# Patient Record
Sex: FEMALE | Race: WHITE
[De-identification: names, ages, dates, MRNs, and addresses within clinical notes are randomized per-mention and may not be internally consistent; named-entity substitution may affect disease eponyms.]

---

## 2018-05-31 ENCOUNTER — HOSPITAL ENCOUNTER (EMERGENCY)
Dept: HOSPITAL 47 - EC | Age: 58
Discharge: HOME | End: 2018-05-31
Payer: COMMERCIAL

## 2018-05-31 VITALS — DIASTOLIC BLOOD PRESSURE: 110 MMHG | SYSTOLIC BLOOD PRESSURE: 194 MMHG

## 2018-05-31 VITALS — HEART RATE: 64 BPM | TEMPERATURE: 98.2 F

## 2018-05-31 VITALS — RESPIRATION RATE: 16 BRPM

## 2018-05-31 DIAGNOSIS — Z88.2: ICD-10-CM

## 2018-05-31 DIAGNOSIS — R04.0: Primary | ICD-10-CM

## 2018-05-31 DIAGNOSIS — F17.200: ICD-10-CM

## 2018-05-31 DIAGNOSIS — E07.9: ICD-10-CM

## 2018-05-31 DIAGNOSIS — F32.9: ICD-10-CM

## 2018-05-31 DIAGNOSIS — Z98.890: ICD-10-CM

## 2018-05-31 DIAGNOSIS — G89.29: ICD-10-CM

## 2018-05-31 DIAGNOSIS — Z79.82: ICD-10-CM

## 2018-05-31 DIAGNOSIS — Z91.040: ICD-10-CM

## 2018-05-31 DIAGNOSIS — Z79.899: ICD-10-CM

## 2018-05-31 PROCEDURE — 99283 EMERGENCY DEPT VISIT LOW MDM: CPT

## 2018-05-31 PROCEDURE — 30901 CONTROL OF NOSEBLEED: CPT

## 2018-11-24 ENCOUNTER — HOSPITAL ENCOUNTER (OUTPATIENT)
Dept: HOSPITAL 47 - RADMRIMAIN | Age: 58
End: 2018-11-24
Attending: FAMILY MEDICINE
Payer: COMMERCIAL

## 2018-11-24 DIAGNOSIS — M16.0: Primary | ICD-10-CM

## 2018-11-24 NOTE — MR
EXAMINATION TYPE: MR hip RT wo con

 

DATE OF EXAM: 11/24/2018

 

COMPARISON: None

 

HISTORY: Right hip pain with locking sensation for 6 months per patient.

 

Standard multiplanar, multisequence MRI departmental protocol

 

Multiplanar, multisequence images of the pelvis focus in right hip is acquired. 

 

FINDINGS: Bone marrow signal intensity about the pelvis including bilateral hips is felt maintained. 
No suspicious edema is seen. No linear T1 signal is noted. Femoral head spherical shape is maintained
 bilaterally.

 

There are small to moderate size right slightly greater than left hip joint effusions. There is mild 
to moderate axial joint space loss in both hips. No significant spurring or subchondral cystic change
s noted. No suspicious edema seen at level of the lesser greater trochanters bilaterally. Right labru
m appears intact given limitations of nonarthrogram study.

 

No suspicious groin adenopathy is seen. No suspicious groin hernia is present. Muscle bulk is symmetr
ic and felt within normal limits.

 

Uterus is felt slightly retroverted. Patient has very little intrapelvic fat making evaluation subopt
imal. Bladder is felt within normal limits. No free fluid in pelvis is noted. No suspicious bowel dil
atation is seen. Pubic symphysis is intact.

 

IMPRESSION: 

Symmetric mild to moderate degenerative changes in both hips as detailed above. No evidence for avasc
ular necrosis or radio occult fracture.

## 2018-12-26 ENCOUNTER — HOSPITAL ENCOUNTER (OUTPATIENT)
Dept: HOSPITAL 47 - RADMRIMAIN | Age: 58
Discharge: HOME | End: 2018-12-26
Attending: FAMILY MEDICINE
Payer: COMMERCIAL

## 2018-12-26 DIAGNOSIS — M47.816: ICD-10-CM

## 2018-12-26 DIAGNOSIS — M43.16: ICD-10-CM

## 2018-12-26 DIAGNOSIS — M51.26: Primary | ICD-10-CM

## 2018-12-26 PROCEDURE — 72148 MRI LUMBAR SPINE W/O DYE: CPT

## 2018-12-26 NOTE — MR
EXAMINATION TYPE: MR lumbar spine wo con

 

DATE OF EXAM: 12/26/2018

 

COMPARISON: NONE

 

HISTORY: Low back pain into Right Leg Pain x6 months per patient. Low back pain per order.

 

TECHNIQUE: Multiplanar, multisequence imaging of the lumbar spine is performed without IV contrast.

 

FINDINGS: Sagittal images of the lumbar spine show vertebral body height to appear satisfactory. Ther
e is grade 1 anterolisthesis of L4 on L5. Multilevel disc desiccation is present. There is mild disc 
space during L3-L4 and L4-L5 levels. Posterior disc herniation L3-L4 level effaces the anterior theca
l sac on sagittal images. The conus medullaris ends inferior T12 level without abnormal signal.  The 
bone marrow signal intensity is overall heterogeneous.

 

Axial images show the T12-L1 and L1-L2 levels to appear within normal limits.

 

Axial images at the L2-L3 level show mild facet degenerative changes and ligamentum flavum hypertroph
y effacing posterior lateral thecal sac. There is mild broad disc bulge minimally effacing the anteri
or thecal sac. Left-sided neural foramina shows mild inferior narrowing. Right sided neural foramen i
s patent.

 

Axial images at the L3-L4 level shows mild facet degenerative changes bilaterally. There is broad-bas
ed right paracentral/foraminal disc protrusion effacing intrathecal sac as well as lateral recess and
 causing mild to moderate right-sided anterior inferior neural foraminal narrowing. Encroachment on c
entral right L4 nerves suspected axial image 14. Left-sided neural foramen is patent.

 

Axial images at L4-L5 level show spondylolisthesis with moderate facet degenerative changes bilateral
ly. There is broad-based right foraminal disc protrusion causing asymmetric moderate right-sided infe
rior neural foraminal narrowing encroaching on right L4 nerve sagittal image 12 and axial image 10.

 

Axial images at the L5-S1 level shows moderate facet degenerative changes bilaterally. Spinal canal i
s preserved. Bilateral neural foramina are patent.

 

Slightly prominent fluid-filled bowel loops are seen in the visualized abdomen.

 

 

IMPRESSION: Multilevel degenerative changes in lumbar spine as detailed above. Most prominent disc he
rniation L3-L4 level appears to encroach on central right L4 nerve. Spondylolisthesis with eccentric 
disc herniation L4-L5 level encroaches on the exiting right L4 nerve also.

## 2020-06-14 ENCOUNTER — HOSPITAL ENCOUNTER (INPATIENT)
Dept: HOSPITAL 47 - EC | Age: 60
LOS: 16 days | Discharge: NURSING, REHAB OR LONG-TERM CARE | DRG: 329 | End: 2020-06-30
Payer: COMMERCIAL

## 2020-06-14 VITALS — BODY MASS INDEX: 27.5 KG/M2

## 2020-06-14 DIAGNOSIS — R13.10: ICD-10-CM

## 2020-06-14 DIAGNOSIS — K56.41: ICD-10-CM

## 2020-06-14 DIAGNOSIS — I50.21: ICD-10-CM

## 2020-06-14 DIAGNOSIS — T50.2X5A: ICD-10-CM

## 2020-06-14 DIAGNOSIS — R00.0: ICD-10-CM

## 2020-06-14 DIAGNOSIS — K38.1: ICD-10-CM

## 2020-06-14 DIAGNOSIS — J18.9: ICD-10-CM

## 2020-06-14 DIAGNOSIS — E51.9: ICD-10-CM

## 2020-06-14 DIAGNOSIS — E87.0: ICD-10-CM

## 2020-06-14 DIAGNOSIS — Z98.84: ICD-10-CM

## 2020-06-14 DIAGNOSIS — Z90.710: ICD-10-CM

## 2020-06-14 DIAGNOSIS — E83.39: ICD-10-CM

## 2020-06-14 DIAGNOSIS — G92: ICD-10-CM

## 2020-06-14 DIAGNOSIS — I34.0: ICD-10-CM

## 2020-06-14 DIAGNOSIS — R65.21: ICD-10-CM

## 2020-06-14 DIAGNOSIS — I42.8: ICD-10-CM

## 2020-06-14 DIAGNOSIS — Z79.899: ICD-10-CM

## 2020-06-14 DIAGNOSIS — I25.2: ICD-10-CM

## 2020-06-14 DIAGNOSIS — E87.4: ICD-10-CM

## 2020-06-14 DIAGNOSIS — E55.9: ICD-10-CM

## 2020-06-14 DIAGNOSIS — Z11.59: ICD-10-CM

## 2020-06-14 DIAGNOSIS — D50.9: ICD-10-CM

## 2020-06-14 DIAGNOSIS — A41.9: ICD-10-CM

## 2020-06-14 DIAGNOSIS — Z91.040: ICD-10-CM

## 2020-06-14 DIAGNOSIS — Z79.890: ICD-10-CM

## 2020-06-14 DIAGNOSIS — K21.9: ICD-10-CM

## 2020-06-14 DIAGNOSIS — I27.20: ICD-10-CM

## 2020-06-14 DIAGNOSIS — G43.909: ICD-10-CM

## 2020-06-14 DIAGNOSIS — Z91.19: ICD-10-CM

## 2020-06-14 DIAGNOSIS — K63.1: Primary | ICD-10-CM

## 2020-06-14 DIAGNOSIS — E43: ICD-10-CM

## 2020-06-14 DIAGNOSIS — I11.0: ICD-10-CM

## 2020-06-14 DIAGNOSIS — K35.20: ICD-10-CM

## 2020-06-14 DIAGNOSIS — E87.5: ICD-10-CM

## 2020-06-14 DIAGNOSIS — E87.6: ICD-10-CM

## 2020-06-14 DIAGNOSIS — B37.3: ICD-10-CM

## 2020-06-14 DIAGNOSIS — Z88.2: ICD-10-CM

## 2020-06-14 DIAGNOSIS — Z90.49: ICD-10-CM

## 2020-06-14 DIAGNOSIS — Q43.8: ICD-10-CM

## 2020-06-14 DIAGNOSIS — Z71.6: ICD-10-CM

## 2020-06-14 DIAGNOSIS — J96.01: ICD-10-CM

## 2020-06-14 DIAGNOSIS — K56.49: ICD-10-CM

## 2020-06-14 DIAGNOSIS — E03.9: ICD-10-CM

## 2020-06-14 DIAGNOSIS — J80: ICD-10-CM

## 2020-06-14 DIAGNOSIS — Z78.1: ICD-10-CM

## 2020-06-14 DIAGNOSIS — F17.200: ICD-10-CM

## 2020-06-14 DIAGNOSIS — F32.9: ICD-10-CM

## 2020-06-14 DIAGNOSIS — N17.0: ICD-10-CM

## 2020-06-14 DIAGNOSIS — E86.0: ICD-10-CM

## 2020-06-14 LAB
ALBUMIN SERPL-MCNC: 3.8 G/DL (ref 3.5–5)
ALP SERPL-CCNC: 82 U/L (ref 38–126)
ALT SERPL-CCNC: 18 U/L (ref 4–34)
AMYLASE SERPL-CCNC: 45 U/L (ref 30–110)
ANION GAP SERPL CALC-SCNC: 5 MMOL/L
APTT BLD: 21.2 SEC (ref 22–30)
AST SERPL-CCNC: 31 U/L (ref 14–36)
BASOPHILS # BLD AUTO: 0 K/UL (ref 0–0.2)
BASOPHILS NFR BLD AUTO: 0 %
BUN SERPL-SCNC: 17 MG/DL (ref 7–17)
CALCIUM SPEC-MCNC: 9.6 MG/DL (ref 8.4–10.2)
CHLORIDE SERPL-SCNC: 108 MMOL/L (ref 98–107)
CO2 SERPL-SCNC: 25 MMOL/L (ref 22–30)
EOSINOPHIL # BLD AUTO: 0.2 K/UL (ref 0–0.7)
EOSINOPHIL NFR BLD AUTO: 3 %
ERYTHROCYTE [DISTWIDTH] IN BLOOD BY AUTOMATED COUNT: 5.2 M/UL (ref 3.8–5.4)
ERYTHROCYTE [DISTWIDTH] IN BLOOD: 16.5 % (ref 11.5–15.5)
GLUCOSE BLD-MCNC: 130 MG/DL (ref 75–99)
GLUCOSE SERPL-MCNC: 138 MG/DL (ref 74–99)
HCT VFR BLD AUTO: 50.4 % (ref 34–46)
HGB BLD-MCNC: 16.2 GM/DL (ref 11.4–16)
INR PPP: 1 (ref ?–1.2)
LYMPHOCYTES # SPEC AUTO: 1.2 K/UL (ref 1–4.8)
LYMPHOCYTES NFR SPEC AUTO: 19 %
MCH RBC QN AUTO: 31.1 PG (ref 25–35)
MCHC RBC AUTO-ENTMCNC: 32 G/DL (ref 31–37)
MCV RBC AUTO: 96.9 FL (ref 80–100)
MONOCYTES # BLD AUTO: 0.3 K/UL (ref 0–1)
MONOCYTES NFR BLD AUTO: 4 %
NEUTROPHILS # BLD AUTO: 4.6 K/UL (ref 1.3–7.7)
NEUTROPHILS NFR BLD AUTO: 73 %
PLATELET # BLD AUTO: 289 K/UL (ref 150–450)
POTASSIUM SERPL-SCNC: 3.6 MMOL/L (ref 3.5–5.1)
PROT SERPL-MCNC: 6.2 G/DL (ref 6.3–8.2)
PT BLD: 10 SEC (ref 9–12)
SODIUM SERPL-SCNC: 138 MMOL/L (ref 137–145)
WBC # BLD AUTO: 6.3 K/UL (ref 3.8–10.6)

## 2020-06-14 PROCEDURE — 74018 RADEX ABDOMEN 1 VIEW: CPT

## 2020-06-14 PROCEDURE — 80061 LIPID PANEL: CPT

## 2020-06-14 PROCEDURE — 93306 TTE W/DOPPLER COMPLETE: CPT

## 2020-06-14 PROCEDURE — 83735 ASSAY OF MAGNESIUM: CPT

## 2020-06-14 PROCEDURE — 83690 ASSAY OF LIPASE: CPT

## 2020-06-14 PROCEDURE — 85730 THROMBOPLASTIN TIME PARTIAL: CPT

## 2020-06-14 PROCEDURE — 84484 ASSAY OF TROPONIN QUANT: CPT

## 2020-06-14 PROCEDURE — 80053 COMPREHEN METABOLIC PANEL: CPT

## 2020-06-14 PROCEDURE — 84443 ASSAY THYROID STIM HORMONE: CPT

## 2020-06-14 PROCEDURE — 85610 PROTHROMBIN TIME: CPT

## 2020-06-14 PROCEDURE — 70450 CT HEAD/BRAIN W/O DYE: CPT

## 2020-06-14 PROCEDURE — 99291 CRITICAL CARE FIRST HOUR: CPT

## 2020-06-14 PROCEDURE — 96365 THER/PROPH/DIAG IV INF INIT: CPT

## 2020-06-14 PROCEDURE — 71045 X-RAY EXAM CHEST 1 VIEW: CPT

## 2020-06-14 PROCEDURE — 86901 BLOOD TYPING SEROLOGIC RH(D): CPT

## 2020-06-14 PROCEDURE — 74177 CT ABD & PELVIS W/CONTRAST: CPT

## 2020-06-14 PROCEDURE — 83605 ASSAY OF LACTIC ACID: CPT

## 2020-06-14 PROCEDURE — 82550 ASSAY OF CK (CPK): CPT

## 2020-06-14 PROCEDURE — 82306 VITAMIN D 25 HYDROXY: CPT

## 2020-06-14 PROCEDURE — 95819 EEG AWAKE AND ASLEEP: CPT

## 2020-06-14 PROCEDURE — 86880 COOMBS TEST DIRECT: CPT

## 2020-06-14 PROCEDURE — 36573 INSJ PICC RS&I 5 YR+: CPT

## 2020-06-14 PROCEDURE — 81001 URINALYSIS AUTO W/SCOPE: CPT

## 2020-06-14 PROCEDURE — 86140 C-REACTIVE PROTEIN: CPT

## 2020-06-14 PROCEDURE — 82150 ASSAY OF AMYLASE: CPT

## 2020-06-14 PROCEDURE — 94002 VENT MGMT INPAT INIT DAY: CPT

## 2020-06-14 PROCEDURE — 96361 HYDRATE IV INFUSION ADD-ON: CPT

## 2020-06-14 PROCEDURE — 87075 CULTR BACTERIA EXCEPT BLOOD: CPT

## 2020-06-14 PROCEDURE — 93005 ELECTROCARDIOGRAM TRACING: CPT

## 2020-06-14 PROCEDURE — 86870 RBC ANTIBODY IDENTIFICATION: CPT

## 2020-06-14 PROCEDURE — 82805 BLOOD GASES W/O2 SATURATION: CPT

## 2020-06-14 PROCEDURE — 83036 HEMOGLOBIN GLYCOSYLATED A1C: CPT

## 2020-06-14 PROCEDURE — 94640 AIRWAY INHALATION TREATMENT: CPT

## 2020-06-14 PROCEDURE — 82525 ASSAY OF COPPER: CPT

## 2020-06-14 PROCEDURE — 84255 ASSAY OF SELENIUM: CPT

## 2020-06-14 PROCEDURE — 84630 ASSAY OF ZINC: CPT

## 2020-06-14 PROCEDURE — 82728 ASSAY OF FERRITIN: CPT

## 2020-06-14 PROCEDURE — 87205 SMEAR GRAM STAIN: CPT

## 2020-06-14 PROCEDURE — 84439 ASSAY OF FREE THYROXINE: CPT

## 2020-06-14 PROCEDURE — 84478 ASSAY OF TRIGLYCERIDES: CPT

## 2020-06-14 PROCEDURE — 96375 TX/PRO/DX INJ NEW DRUG ADDON: CPT

## 2020-06-14 PROCEDURE — 84590 ASSAY OF VITAMIN A: CPT

## 2020-06-14 PROCEDURE — 86850 RBC ANTIBODY SCREEN: CPT

## 2020-06-14 PROCEDURE — 83550 IRON BINDING TEST: CPT

## 2020-06-14 PROCEDURE — 96366 THER/PROPH/DIAG IV INF ADDON: CPT

## 2020-06-14 PROCEDURE — 82533 TOTAL CORTISOL: CPT

## 2020-06-14 PROCEDURE — 36415 COLL VENOUS BLD VENIPUNCTURE: CPT

## 2020-06-14 PROCEDURE — 88307 TISSUE EXAM BY PATHOLOGIST: CPT

## 2020-06-14 PROCEDURE — 84425 ASSAY OF VITAMIN B-1: CPT

## 2020-06-14 PROCEDURE — 82040 ASSAY OF SERUM ALBUMIN: CPT

## 2020-06-14 PROCEDURE — 82607 VITAMIN B-12: CPT

## 2020-06-14 PROCEDURE — 36600 WITHDRAWAL OF ARTERIAL BLOOD: CPT

## 2020-06-14 PROCEDURE — 0DTJ0ZZ RESECTION OF APPENDIX, OPEN APPROACH: ICD-10-PCS

## 2020-06-14 PROCEDURE — 96376 TX/PRO/DX INJ SAME DRUG ADON: CPT

## 2020-06-14 PROCEDURE — 0DTN0ZZ RESECTION OF SIGMOID COLON, OPEN APPROACH: ICD-10-PCS

## 2020-06-14 PROCEDURE — 82553 CREATINE MB FRACTION: CPT

## 2020-06-14 PROCEDURE — 76770 US EXAM ABDO BACK WALL COMP: CPT

## 2020-06-14 PROCEDURE — 0D1M0Z4 BYPASS DESCENDING COLON TO CUTANEOUS, OPEN APPROACH: ICD-10-PCS

## 2020-06-14 PROCEDURE — 82746 ASSAY OF FOLIC ACID SERUM: CPT

## 2020-06-14 PROCEDURE — 87070 CULTURE OTHR SPECIMN AEROBIC: CPT

## 2020-06-14 PROCEDURE — 86902 BLOOD TYPE ANTIGEN DONOR EA: CPT

## 2020-06-14 PROCEDURE — 83540 ASSAY OF IRON: CPT

## 2020-06-14 PROCEDURE — 83970 ASSAY OF PARATHORMONE: CPT

## 2020-06-14 PROCEDURE — 82330 ASSAY OF CALCIUM: CPT

## 2020-06-14 PROCEDURE — 80048 BASIC METABOLIC PNL TOTAL CA: CPT

## 2020-06-14 PROCEDURE — 87040 BLOOD CULTURE FOR BACTERIA: CPT

## 2020-06-14 PROCEDURE — 94660 CPAP INITIATION&MGMT: CPT

## 2020-06-14 PROCEDURE — 0DCN0ZZ EXTIRPATION OF MATTER FROM SIGMOID COLON, OPEN APPROACH: ICD-10-PCS

## 2020-06-14 PROCEDURE — 85025 COMPLETE CBC W/AUTO DIFF WBC: CPT

## 2020-06-14 PROCEDURE — 0DCM0ZZ EXTIRPATION OF MATTER FROM DESCENDING COLON, OPEN APPROACH: ICD-10-PCS

## 2020-06-14 PROCEDURE — 94003 VENT MGMT INPAT SUBQ DAY: CPT

## 2020-06-14 PROCEDURE — 86900 BLOOD TYPING SEROLOGIC ABO: CPT

## 2020-06-14 PROCEDURE — 87086 URINE CULTURE/COLONY COUNT: CPT

## 2020-06-14 PROCEDURE — 84132 ASSAY OF SERUM POTASSIUM: CPT

## 2020-06-14 PROCEDURE — 84100 ASSAY OF PHOSPHORUS: CPT

## 2020-06-14 RX ADMIN — CEFAZOLIN SCH MLS/HR: 330 INJECTION, POWDER, FOR SOLUTION INTRAMUSCULAR; INTRAVENOUS at 17:35

## 2020-06-14 NOTE — XR
EXAMINATION TYPE: XR KUB , 2 VIEWS

 

DATE OF EXAM ORDERED: 6/14/2020

 

HISTORY: abdominal pain.

 

COMPARISON: None.

 

FINDINGS:  The lung bases are clear.

 

Within the abdomen, the abdominal gas pattern is normal. There is no evidence of obstruction or free 
air. Vascular calcifications are noted.

 

IMPRESSION: 

 

NO ACUTE INTRA-ABDOMINAL ABNORMALITY.

## 2020-06-14 NOTE — P.ANPRN
Procedure Note - Anesthesia





- Invasive Line


  ** Right Central Line


Time Out Performed: Yes (presurgical, emergent procedure)


Date of Procedure: 06/14/20


Time of Procedure: 21:30


Location of Patient: PreOp


Preparation: Sterile Prep, Sterile Dressing


Ultrasound Used: Yes


Purpose - Visualization and  Identification of Vasculature: Yes


Needle Guage: 18g angio


Image Stored and Saved: Yes


Narrative: 


Central line placement per sterile protocol utilized. Right IJ prepped.   +local

+angio +jwire +uneventful dilation and introduction right IJ TLC.   Secured at 

15cm.   dressed.

## 2020-06-14 NOTE — CT
EXAMINATION TYPE: CT abdomen pelvis w con

 

DATE OF EXAM: 6/14/2020

 

REFERENCE: NONE

 

HISTORY: ab pain/distention

HISTORY: pain and distention

 

REFERENCE: NONE

 

CT DLP: 804.2 mGy

Automated exposure control for dose reduction was used.

 

TECHNIQUE: Helical acquisition through the abdomen and pelvis was obtained following the oral ingesti
on of without Oral Contrast and following intravenous administration of 100 mL of Isovue 300. The anabel
a was reformatted in axial, coronal and sagittal projections.

 

FINDINGS:  There is mild dependent atelectasis in the dependent portions of the lungs. There is also 
small amount of airspace disease in the left lingula.

 

There is no pleural or pericardial fluid. The heart is not enlarged.

 

Within the abdomen, there is been previous gastric surgery. There are several droplets of free air wi
thin the abdomen. There is a small amount of ascites.

 

The gallbladder is been removed. The liver and spleen appear normal.

 

Both adrenal glands appear unremarkable.

 

Both kidneys demonstrate function and appear morphologically normal.

 

There is dilatation of this common bile duct which measures up to 1.5 cm at the level of the head of 
the pancreas. The pancreas is otherwise unremarkable.

 

There is no significant retroperitoneal, iliac or inguinal adenopathy.

 

The bladder is unremarkable. The uterus and ovaries appear normal.

 

There is no significant diverticulosis is no radiographic evidence of diverticulitis. I do not see co
nvincing evidence of volvulus. The appendix is not visualized with certainty.

 

There is diffuse thickening of the small bowel there is some mild dilatation of the small bowel. The 
the celiac and SMA arteries are patent. The MANJEET artery is not visualized with certainty.

 

There is degenerative disc disease and mild hypertrophic spondylosis most marked at L3-4. There is fa
cet arthropathy in the lower lumbar facets.

 

IMPRESSION: 

1. DIFFUSELY THICKENED SMALL BOWEL THROUGHOUT THE ABDOMEN.

2. SMALL DROPLETS OF FREE AIR AS WELL AS A SMALL AMOUNT OF ASCITES.

3. EVIDENCE OF MULTIPLE PREVIOUS SURGERIES INCLUDING SMALL BOWEL RESECTION AND GASTRIC SURGERY.

4. DEGENERATIVE CHANGES WITHIN THE LUMBAR SPINE AND DORSAL SPINE.

 

THIS REPORT WAS PHONED TO DR. ZHAO IN THE ER AT THE TIME OF REPORTING.

## 2020-06-14 NOTE — P.GSHP
History of Present Illness


H&P Date: 20








CHIEF COMPLAINT: Abdominal pain





HISTORY OF PRESENT ILLNESS: The patient is a 59-year-old female who presents 

acutely to the emergency room after developing acute onset abdominal pain 4 

hours ago.  She reports that she has a history of gastric bypass 15 years ago, 

 and has not had any bariatric follow-up.  She is an active tobacco abuser. 

She reports intermittent dysphagia to foods.  She reports no passage of flatus 

today or bowel movement today.  Her  is at bedside giving additional 

history where she usually has a bowel movement daily however this is changed.  

He reports that she complained of abdominal pain during the car ride to the 

emergency room.  At this time, patient reports severe abdominal pain where she 

is uncomfortable even with laying down.  On further discussion, patient reports 

having several weeks of abdominal pain mostly epigastric for almost one month 

and had not sought any medical attention.  





PAST MEDICAL HISTORY: 


See list and reviewed





PAST SURGICAL HISTORY: 


See list and reviewed.  History of gastric bypass and cholecystectomy





MEDICATIONS: 


See list and reviewed





ALLERGIES: 


See list and reviewed





SOCIAL HISTORY: See list and reviewed.  Active tobacco abuse





FAMILY HISTORY:  See list and reviewed





REVIEW OF ORGAN SYSTEMS:


CONSTITUTIONAL:  No fevers or chills.  Intentional weight loss due to gastric 

bypass surgery over 15 years ago, .


EYES: Denies any trouble with vision. No glasses.


HEENT:  No difficulties with hearing. No nosebleeds.  No difficulty swallowing. 


RESPIRATORY:  Denies pneumonia. 


CARDIOVASCULAR:  Denies any chest pain.


GASTROINTESTINAL: Has food intolerance.  Has change in bowel habits and gas 

bloat.  


GENITOURINARY:  Denies any blood in urine or increased urinary frequency.  


NEUROLOGICAL:  No seizure disorders or headaches.


MUSCULOSKELETAL:  Has back pain, stiffness or joint arthritis. 


SKIN: No current skin cancer. No rash.


PSYCHIATRIC:  Has depression.  No suicidal ideation.


ENDOCRINE:  Has thyroid disorders. Denies any blood sugar glucose intolerance.


HEME/LYMPHATIC: Denies any lumps and bumps around the neck. No recent deep 

venous thrombosis.


ALLERGY/IMMUNOLOGY:  No immunoglobulin therapy. No immune deficiencies.


BREAST: Denies current breast lumps, pain or nipple discharge.





PHYSICAL EXAM:


VITALS: Reviewed


CONSTITUTIONAL:  Well developed and in acute distress. 


EYES:  Conjuctivae without sclera icterus. Pupils are equally round and reactive

to light. Extraocular movements grossly intact. 


HEAD, EARS, NOSE, THROAT: Dry buccal mucosa. Head is atraumatic, normocephalic. 

Hears conversational speech. No nasal drainage. 


NECK:  Supple. No JV distention. No thyroidomegaly.


RESPIRATORY:  Non-labored respirations and equal bilateral excursions. No gross 

wheezes. 


CARDIOVASCULAR:  Regular rate and rhythm.  Extremities without moderate edema. 

Palpable 2+ radial pulses.


ABDOMEN:  Soft.  Distended.  Diffusely tender with peritonitis


LYMPH: No neck lymphadenopathy. 


MUSCULOSKELETAL: No clubbing cyanosis or edema.  Nail and fingers with good 

capillary refill.


SKIN:  Warm and well perfused and tanned.


NEUROLOGIC: Cranial nerves II through XII grossly intact. Sensation upper and 

extremities intact. No focal or lateralizing signs. 


PSYCH:  Appropriate affect.  Alert and oriented to person, place and time. 





CLINCAL LABS: Reviewed.  WBC normal 6300.  Lactate normal 1.1





IMAGING: Independently reviewed demonstrating features of gastric bypass.  Small

bowel including the [place] limb with moderate edema.  Moderate stool throughout 

the entire colon.





RADIOLOGY: Report reviewed with intraperitoneal small foci of air and ascites.





ASSESSMENT: 


1.  Abnormal computed tomography scan with intraperitoneal air


2.  Abdominal pain with peritonitis, generalized


3.  History of gastric bypass.





PLAN: 


1.  Clinical exam consistent with peritonitis including with history of gastric 

bypass.  Emergent exploratory laparotomy with small bowel resection and possible

ostomy also prescribed for which patient is high risk secondary to her smoking.


2.  Extensive tobacco cessation counseling performed over 3 minutes as smoking 

is contraindicated with history of gastric bypass for risk of ulcers or 

perforation


3.  Recovery and intensive care unit including prolonged intubation also 

described.


4.  All questions were addressed and answered to the patient and  at 

bedside.


5.  Inpatient hospitalization over 2 nights


6.  Emergent critical and assessment performed with immediate communication with

additional physicians including anesthesia, IV fluid boluses, EKG, surgical 

intervention described.





CRITICAL CARE TIME: 33 minutes











Past Medical History


Past Medical History: Thyroid Disorder


History of Any Multi-Drug Resistant Organisms: None Reported


Past Surgical History: Bariatric Surgery,  Section, Cholecystectomy, 

Tonsillectomy


Additional Past Surgical History / Comment(s): gastric bypass, sinus


Past Psychological History: Depression


Smoking Status: Current every day smoker


Past Alcohol Use History: None Reported


Past Drug Use History: None Reported





Medications and Allergies


                                Home Medications











 Medication  Instructions  Recorded  Confirmed  Type


 


Baclofen [Lioresal] 20 mg PO HS 18 History


 


FLUoxetine HCL [PROzac] 20 mg PO DAILY 18 History


 


Levothyroxine Sodium [Synthroid] 125 mcg PO DAILY 18 History


 


Propranolol HCl [Inderal LA] 80 mg PO HS 18 History


 


Rizatriptan Benzoate [Maxalt MLT] 10 mg PO DAILY PRN 18 History


 


Butalb/APAP/Caff -40Mg 1 tab PO Q4H PRN 20 History





[Fioricet -40]    


 


Chlorthalidone 50 mg PO DAILY 20 History


 


Cholecalciferol [Vitamin D3 (25 1,000 unit PO DAILY 20 History





Mcg = 1000 Iu)]    


 


Multivitamins, Thera [Multivitamin 1 tab PO DAILY 20 History





(formulary)]    








                                    Allergies











Allergy/AdvReac Type Severity Reaction Status Date / Time


 


latex Allergy  Rash/Hives Verified 20 12:08


 


Sulfa (Sulfonamide Allergy  Unknown Verified 20 12:08





Antibiotics)   Childhood  














Surgical - Exam


                                   Vital Signs











Temp Pulse Resp BP Pulse Ox


 


 97.4 F L  63   16   100/75   99 


 


 20 10:50  20 10:50  20 10:50  20 10:50  20 10:50














Results





- Labs





                                 20 11:00





                                 20 11:00


                  Abnormal Lab Results - Last 24 Hours (Table)











  20 Range/Units





  11:00 11:00 11:00 


 


Hgb  16.2 H    (11.4-16.0)  gm/dL


 


Hct  50.4 H    (34.0-46.0)  %


 


RDW  16.5 H    (11.5-15.5)  %


 


APTT   21.2 L   (22.0-30.0)  sec


 


Chloride    108 H  ()  mmol/L


 


Glucose    138 H  (74-99)  mg/dL


 


Total Protein    6.2 L  (6.3-8.2)  g/dL








                                 Diabetes panel











  20 Range/Units





  11:00 


 


Sodium  138  (137-145)  mmol/L


 


Potassium  3.6  (3.5-5.1)  mmol/L


 


Chloride  108 H  ()  mmol/L


 


Carbon Dioxide  25  (22-30)  mmol/L


 


BUN  17  (7-17)  mg/dL


 


Creatinine  0.53  (0.52-1.04)  mg/dL


 


Glucose  138 H  (74-99)  mg/dL


 


Calcium  9.6  (8.4-10.2)  mg/dL


 


AST  31  (14-36)  U/L


 


ALT  18  (4-34)  U/L


 


Alkaline Phosphatase  82  ()  U/L


 


Total Protein  6.2 L  (6.3-8.2)  g/dL


 


Albumin  3.8  (3.5-5.0)  g/dL








                                  Calcium panel











  20 Range/Units





  11:00 


 


Calcium  9.6  (8.4-10.2)  mg/dL


 


Albumin  3.8  (3.5-5.0)  g/dL








                                 Pituitary panel











  20 Range/Units





  11:00 


 


Sodium  138  (137-145)  mmol/L


 


Potassium  3.6  (3.5-5.1)  mmol/L


 


Chloride  108 H  ()  mmol/L


 


Carbon Dioxide  25  (22-30)  mmol/L


 


BUN  17  (7-17)  mg/dL


 


Creatinine  0.53  (0.52-1.04)  mg/dL


 


Glucose  138 H  (74-99)  mg/dL


 


Calcium  9.6  (8.4-10.2)  mg/dL








                                  Adrenal panel











  20 Range/Units





  11:00 


 


Sodium  138  (137-145)  mmol/L


 


Potassium  3.6  (3.5-5.1)  mmol/L


 


Chloride  108 H  ()  mmol/L


 


Carbon Dioxide  25  (22-30)  mmol/L


 


BUN  17  (7-17)  mg/dL


 


Creatinine  0.53  (0.52-1.04)  mg/dL


 


Glucose  138 H  (74-99)  mg/dL


 


Calcium  9.6  (8.4-10.2)  mg/dL


 


Total Bilirubin  0.3  (0.2-1.3)  mg/dL


 


AST  31  (14-36)  U/L


 


ALT  18  (4-34)  U/L


 


Alkaline Phosphatase  82  ()  U/L


 


Total Protein  6.2 L  (6.3-8.2)  g/dL


 


Albumin  3.8  (3.5-5.0)  g/dL














Assessment and Plan


(1) Peritonitis (acute) generalized


Current Visit: Yes   Status: Acute   Code(s): K65.0 - GENERALIZED (ACUTE) 

PERITONITIS   SNOMED Code(s): 79805695


   





(2) History of gastric bypass


Current Visit: Yes   Status: Acute   Code(s): Z98.84 - BARIATRIC SURGERY STATUS 

 SNOMED Code(s): 401085087


   





(3) Medical non-compliance


Current Visit: Yes   Status: Acute   Code(s): Z91.19 - PATIENT'S NONCOMPLIANCE W

OT MEDICAL TREATMENT AND REGIMEN   SNOMED Code(s): 827800695


   





(4) Tobacco abuse


Current Visit: Yes   Status: Acute   Code(s): Z72.0 - TOBACCO USE   SNOMED Code(

s): 702234687


   





(5) Tobacco abuse counseling


Current Visit: Yes   Status: Acute   Code(s): Z71.6 - TOBACCO ABUSE COUNSELING  

SNOMED Code(s): 867728294


   





(6) Depressive disorder


Current Visit: Yes   Status: Acute   Code(s): F32.9 - MAJOR DEPRESSIVE DISORDER,

SINGLE EPISODE, UNSPECIFIED   SNOMED Code(s): 09496293


   





(7) Hypothyroidism


Current Visit: Yes   Status: Acute   Code(s): E03.9 - HYPOTHYROIDISM, 

UNSPECIFIED   SNOMED Code(s): 17019233


   





(8) Abdominal pain


Current Visit: Yes   Status: Acute   Code(s): R10.9 - UNSPECIFIED ABDOMINAL PAIN

  SNOMED Code(s): 64933328


   





(9) Free intraperitoneal air


Current Visit: Yes   Status: Acute   Code(s): K66.8 - OTHER SPECIFIED DISORDERS 

OF PERITONEUM   SNOMED Code(s): 24292980


   





(10) Dehydration


Current Visit: Yes   Status: Acute   Code(s): E86.0 - DEHYDRATION   SNOMED 

Code(s): 55953277

## 2020-06-14 NOTE — ED
General Adult HPI





- General


Chief complaint: Abdominal Pain


Stated complaint: abd pain


Time Seen by Provider: 20 10:56


Source: patient, RN notes reviewed, old records reviewed


Mode of arrival: wheelchair


Limitations: no limitations





- History of Present Illness


Initial comments: 





59-year-old female presenting for evaluation of generalized abdominal pain.  

Pain began yesterday evening, crampy in nature.  She's had nausea without 

significant vomiting.  She states she did pass gas and had a small bowel 

movement this morning.  Pain is generalized, severe.  She reports abdominal 

distention.  Chest previous history of cholecystectomy, hysterectomy.  No fever 

or chills.  No chest pain or dyspnea.





- Related Data


                                Home Medications











 Medication  Instructions  Recorded  Confirmed


 


Baclofen [Lioresal] 20 mg PO HS 18


 


FLUoxetine HCL [PROzac] 20 mg PO DAILY 18


 


Levothyroxine Sodium [Synthroid] 125 mcg PO DAILY 18


 


Propranolol HCl [Inderal LA] 80 mg PO HS 18


 


Rizatriptan Benzoate [Maxalt MLT] 10 mg PO DAILY PRN 18


 


Butalb/APAP/Caff -40Mg 1 tab PO Q4H PRN 20





[Fioricet -40]   


 


Chlorthalidone 50 mg PO DAILY 20


 


Cholecalciferol [Vitamin D3 (25 1,000 unit PO DAILY 20





Mcg = 1000 Iu)]   


 


Multivitamins, Thera [Multivitamin 1 tab PO DAILY 20





(formulary)]   











                                    Allergies











Allergy/AdvReac Type Severity Reaction Status Date / Time


 


latex Allergy  Rash/Hives Verified 20 12:08


 


Sulfa (Sulfonamide Allergy  Unknown Verified 20 12:08





Antibiotics)   Childhood  














Review of Systems


ROS Statement: 


Those systems with pertinent positive or pertinent negative responses have been 

documented in the HPI.





ROS Other: All systems not noted in ROS Statement are negative.





Past Medical History


Past Medical History: Thyroid Disorder


History of Any Multi-Drug Resistant Organisms: None Reported


Past Surgical History: Bariatric Surgery,  Section, Cholecystectomy, 

Tonsillectomy


Additional Past Surgical History / Comment(s): gastric bypass, sinus


Past Psychological History: Depression


Smoking Status: Current every day smoker


Past Alcohol Use History: None Reported


Past Drug Use History: None Reported





General Exam


Limitations: no limitations


General appearance: alert, in distress


Head exam: Present: atraumatic, normocephalic


Eye exam: Present: normal appearance, PERRL


ENT exam: Present: mucous membranes dry


Neck exam: Present: normal inspection.  Absent: tenderness, meningismus


Respiratory exam: Present: normal lung sounds bilaterally.  Absent: respiratory 

distress, wheezes


Cardiovascular Exam: Present: regular rate, normal rhythm


GI/Abdominal exam: Present: soft, distended, tenderness, guarding


Extremities exam: Present: normal inspection, normal capillary refill


Neurological exam: Present: alert, oriented X3, CN II-XII intact.  Absent: motor

sensory deficit


Psychiatric exam: Present: normal affect, normal mood


Skin exam: Present: warm, dry, intact.  Absent: cyanosis, diaphoretic





Course


                                   Vital Signs











  20





  10:50


 


Temperature 97.4 F L


 


Pulse Rate 63


 


Respiratory 16





Rate 


 


Blood Pressure 100/75


 


O2 Sat by Pulse 99





Oximetry 














EKG Findings





- EKG Comments:


EKG Findings:: EKG: Normal sinus rhythm, no ST segment elevation inferior 

infarct, rate of 92, MT interval 152, QRS duration 94, 





Medical Decision Making





- Medical Decision Making





59-year-old female with severe abdominal pain.  Remote history of bariatric 

surgery, hysterectomy, cholecystectomy.  Patient has a distended abdomen, 

diffusely tender to palpation, palpable mass in the right hemiabdomen.  Vitals 

are stable.  Laboratory testing performed, no leukocytosis, stable hemoglobin, 

normal lactic, normal electrolytes, CT performed showing intraperitoneal free 

air, thickened small bowel, case is discussed with general surgery on-call, Dr. Reed, patient will be taken to the operating room for urgent evaluation of 

intraperitoneal free air.





- Lab Data


Result diagrams: 


                                 20 11:00





                                 20 11:00


                                   Lab Results











  20 Range/Units





  11:00 11:00 11:00 


 


WBC  6.3    (3.8-10.6)  k/uL


 


RBC  5.20    (3.80-5.40)  m/uL


 


Hgb  16.2 H    (11.4-16.0)  gm/dL


 


Hct  50.4 H    (34.0-46.0)  %


 


MCV  96.9    (80.0-100.0)  fL


 


MCH  31.1    (25.0-35.0)  pg


 


MCHC  32.0    (31.0-37.0)  g/dL


 


RDW  16.5 H    (11.5-15.5)  %


 


Plt Count  289    (150-450)  k/uL


 


Neutrophils %  73    %


 


Lymphocytes %  19    %


 


Monocytes %  4    %


 


Eosinophils %  3    %


 


Basophils %  0    %


 


Neutrophils #  4.6    (1.3-7.7)  k/uL


 


Lymphocytes #  1.2    (1.0-4.8)  k/uL


 


Monocytes #  0.3    (0-1.0)  k/uL


 


Eosinophils #  0.2    (0-0.7)  k/uL


 


Basophils #  0.0    (0-0.2)  k/uL


 


Anisocytosis  Slight    


 


PT   10.0   (9.0-12.0)  sec


 


INR   1.0   (<1.2)  


 


APTT   21.2 L   (22.0-30.0)  sec


 


Sodium    138  (137-145)  mmol/L


 


Potassium    3.6  (3.5-5.1)  mmol/L


 


Chloride    108 H  ()  mmol/L


 


Carbon Dioxide    25  (22-30)  mmol/L


 


Anion Gap    5  mmol/L


 


BUN    17  (7-17)  mg/dL


 


Creatinine    0.53  (0.52-1.04)  mg/dL


 


Est GFR (CKD-EPI)AfAm    >90  (>60 ml/min/1.73 sqM)  


 


Est GFR (CKD-EPI)NonAf    >90  (>60 ml/min/1.73 sqM)  


 


Glucose    138 H  (74-99)  mg/dL


 


Plasma Lactic Acid Buck     (0.7-2.0)  mmol/L


 


Calcium    9.6  (8.4-10.2)  mg/dL


 


Total Bilirubin    0.3  (0.2-1.3)  mg/dL


 


AST    31  (14-36)  U/L


 


ALT    18  (4-34)  U/L


 


Alkaline Phosphatase    82  ()  U/L


 


Total Protein    6.2 L  (6.3-8.2)  g/dL


 


Albumin    3.8  (3.5-5.0)  g/dL


 


Amylase    45  ()  U/L


 


Lipase    66  ()  U/L














  20 Range/Units





  11:00 


 


WBC   (3.8-10.6)  k/uL


 


RBC   (3.80-5.40)  m/uL


 


Hgb   (11.4-16.0)  gm/dL


 


Hct   (34.0-46.0)  %


 


MCV   (80.0-100.0)  fL


 


MCH   (25.0-35.0)  pg


 


MCHC   (31.0-37.0)  g/dL


 


RDW   (11.5-15.5)  %


 


Plt Count   (150-450)  k/uL


 


Neutrophils %   %


 


Lymphocytes %   %


 


Monocytes %   %


 


Eosinophils %   %


 


Basophils %   %


 


Neutrophils #   (1.3-7.7)  k/uL


 


Lymphocytes #   (1.0-4.8)  k/uL


 


Monocytes #   (0-1.0)  k/uL


 


Eosinophils #   (0-0.7)  k/uL


 


Basophils #   (0-0.2)  k/uL


 


Anisocytosis   


 


PT   (9.0-12.0)  sec


 


INR   (<1.2)  


 


APTT   (22.0-30.0)  sec


 


Sodium   (137-145)  mmol/L


 


Potassium   (3.5-5.1)  mmol/L


 


Chloride   ()  mmol/L


 


Carbon Dioxide   (22-30)  mmol/L


 


Anion Gap   mmol/L


 


BUN   (7-17)  mg/dL


 


Creatinine   (0.52-1.04)  mg/dL


 


Est GFR (CKD-EPI)AfAm   (>60 ml/min/1.73 sqM)  


 


Est GFR (CKD-EPI)NonAf   (>60 ml/min/1.73 sqM)  


 


Glucose   (74-99)  mg/dL


 


Plasma Lactic Acid Buck  1.1  (0.7-2.0)  mmol/L


 


Calcium   (8.4-10.2)  mg/dL


 


Total Bilirubin   (0.2-1.3)  mg/dL


 


AST   (14-36)  U/L


 


ALT   (4-34)  U/L


 


Alkaline Phosphatase   ()  U/L


 


Total Protein   (6.3-8.2)  g/dL


 


Albumin   (3.5-5.0)  g/dL


 


Amylase   ()  U/L


 


Lipase   ()  U/L














Critical Care Time


Critical Care Time: Yes


Total Critical Care Time: 35





Disposition


Clinical Impression: 


 Abdominal pain, Free intraperitoneal air





Disposition: ADMITTED AS IP TO THIS HOSP


Condition: Serious


Is patient prescribed a controlled substance at d/c from ED?: No


Referrals: 


Galdino Shaw DO [Primary Care Provider] - 1-2 days


Decision to Admit Reason: Admit from EC


Decision Date: 20


Decision Time: 12:03

## 2020-06-15 LAB
ALBUMIN SERPL-MCNC: 2.1 G/DL (ref 3.5–5)
ALP SERPL-CCNC: 40 U/L (ref 38–126)
ALT SERPL-CCNC: 18 U/L (ref 4–34)
ANION GAP SERPL CALC-SCNC: 3 MMOL/L
AST SERPL-CCNC: 36 U/L (ref 14–36)
BUN SERPL-SCNC: 21 MG/DL (ref 7–17)
CALCIUM SPEC-MCNC: 7.4 MG/DL (ref 8.4–10.2)
CELLS COUNTED: 200
CHLORIDE SERPL-SCNC: 117 MMOL/L (ref 98–107)
CO2 BLDA-SCNC: 19 MMOL/L (ref 19–24)
CO2 BLDA-SCNC: 19 MMOL/L (ref 19–24)
CO2 SERPL-SCNC: 18 MMOL/L (ref 22–30)
ERYTHROCYTE [DISTWIDTH] IN BLOOD BY AUTOMATED COUNT: 5.65 M/UL (ref 3.8–5.4)
ERYTHROCYTE [DISTWIDTH] IN BLOOD: 16.7 % (ref 11.5–15.5)
GLUCOSE BLD-MCNC: 101 MG/DL (ref 75–99)
GLUCOSE BLD-MCNC: 110 MG/DL (ref 75–99)
GLUCOSE BLD-MCNC: 126 MG/DL (ref 75–99)
GLUCOSE BLD-MCNC: 97 MG/DL (ref 75–99)
GLUCOSE SERPL-MCNC: 137 MG/DL (ref 74–99)
HCO3 BLDA-SCNC: 18 MMOL/L (ref 21–25)
HCO3 BLDA-SCNC: 18 MMOL/L (ref 21–25)
HCO3 BLDA-SCNC: 22 MMOL/L (ref 21–25)
HCT VFR BLD AUTO: 55.5 % (ref 34–46)
HGB BLD-MCNC: 17.6 GM/DL (ref 11.4–16)
HYALINE CASTS UR QL AUTO: 13 /LPF (ref 0–2)
LYMPHOCYTES # BLD MANUAL: 0.68 K/UL (ref 1–4.8)
MAGNESIUM SPEC-SCNC: 2.5 MG/DL (ref 1.6–2.3)
MCH RBC QN AUTO: 31.1 PG (ref 25–35)
MCHC RBC AUTO-ENTMCNC: 31.6 G/DL (ref 31–37)
MCV RBC AUTO: 98.4 FL (ref 80–100)
MIXED CELL CASTS UR QL COMP ASSIST: 8 /LPF
NEUTROPHILS NFR BLD MANUAL: 12 %
NEUTS SEG # BLD MANUAL: 6.1 K/UL (ref 1.3–7.7)
PCO2 BLDA: 36 MMHG (ref 35–45)
PCO2 BLDA: 39 MMHG (ref 35–45)
PCO2 BLDA: 63 MMHG (ref 35–45)
PH BLDA: 7.17 [PH] (ref 7.35–7.45)
PH BLDA: 7.27 [PH] (ref 7.35–7.45)
PH BLDA: 7.3 [PH] (ref 7.35–7.45)
PH UR: 5.5 [PH] (ref 5–8)
PLATELET # BLD AUTO: 341 K/UL (ref 150–450)
PO2 BLDA: 103 MMHG (ref 83–108)
PO2 BLDA: 130 MMHG (ref 83–108)
PO2 BLDA: 93 MMHG (ref 83–108)
POTASSIUM SERPL-SCNC: 4 MMOL/L (ref 3.5–5.1)
PROT SERPL-MCNC: 4.1 G/DL (ref 6.3–8.2)
PROT UR QL: (no result)
RBC UR QL: >182 /HPF (ref 0–5)
SODIUM SERPL-SCNC: 138 MMOL/L (ref 137–145)
SP GR UR: 1.04 (ref 1–1.03)
SQUAMOUS UR QL AUTO: 1 /HPF (ref 0–4)
UROBILINOGEN UR QL STRIP: 2 MG/DL (ref ?–2)
WBC # BLD AUTO: 6.8 K/UL (ref 3.8–10.6)
WBC # UR AUTO: 13 /HPF (ref 0–5)

## 2020-06-15 RX ADMIN — CHLORHEXIDINE GLUCONATE SCH ML: 1.2 RINSE ORAL at 09:46

## 2020-06-15 RX ADMIN — PANTOPRAZOLE SODIUM SCH MG: 40 INJECTION, POWDER, FOR SOLUTION INTRAVENOUS at 09:45

## 2020-06-15 RX ADMIN — METRONIDAZOLE SCH MLS/HR: 500 INJECTION, SOLUTION INTRAVENOUS at 18:26

## 2020-06-15 RX ADMIN — PROPOFOL SCH MLS/HR: 10 INJECTION, EMULSION INTRAVENOUS at 00:00

## 2020-06-15 RX ADMIN — FUROSEMIDE SCH MLS/HR: 10 INJECTION, SOLUTION INTRAMUSCULAR; INTRAVENOUS at 15:58

## 2020-06-15 RX ADMIN — FUROSEMIDE SCH MLS/HR: 10 INJECTION, SOLUTION INTRAMUSCULAR; INTRAVENOUS at 23:42

## 2020-06-15 RX ADMIN — PIPERACILLIN AND TAZOBACTAM SCH MLS/HR: 3; .375 INJECTION, POWDER, FOR SOLUTION INTRAVENOUS at 12:33

## 2020-06-15 RX ADMIN — CEFAZOLIN SCH MLS/HR: 330 INJECTION, POWDER, FOR SOLUTION INTRAMUSCULAR; INTRAVENOUS at 08:30

## 2020-06-15 RX ADMIN — PROPOFOL SCH MLS/HR: 10 INJECTION, EMULSION INTRAVENOUS at 21:53

## 2020-06-15 RX ADMIN — PIPERACILLIN AND TAZOBACTAM SCH MLS/HR: 3; .375 INJECTION, POWDER, FOR SOLUTION INTRAVENOUS at 03:07

## 2020-06-15 RX ADMIN — PROPOFOL SCH MLS/HR: 10 INJECTION, EMULSION INTRAVENOUS at 15:04

## 2020-06-15 RX ADMIN — PROPOFOL SCH MLS/HR: 10 INJECTION, EMULSION INTRAVENOUS at 05:26

## 2020-06-15 RX ADMIN — ACETAMINOPHEN PRN MLS/HR: 10 INJECTION, SOLUTION INTRAVENOUS at 22:25

## 2020-06-15 RX ADMIN — HEPARIN SODIUM SCH UNIT: 5000 INJECTION, SOLUTION INTRAVENOUS; SUBCUTANEOUS at 09:45

## 2020-06-15 RX ADMIN — ACETAMINOPHEN PRN MLS/HR: 10 INJECTION, SOLUTION INTRAVENOUS at 16:02

## 2020-06-15 RX ADMIN — IPRATROPIUM BROMIDE AND ALBUTEROL SULFATE SCH ML: .5; 3 SOLUTION RESPIRATORY (INHALATION) at 20:16

## 2020-06-15 RX ADMIN — CEFAZOLIN SCH MLS/HR: 330 INJECTION, POWDER, FOR SOLUTION INTRAMUSCULAR; INTRAVENOUS at 00:45

## 2020-06-15 RX ADMIN — SODIUM CHLORIDE SCH MLS/HR: 900 INJECTION, SOLUTION INTRAVENOUS at 05:14

## 2020-06-15 RX ADMIN — HEPARIN SODIUM SCH UNIT: 5000 INJECTION, SOLUTION INTRAVENOUS; SUBCUTANEOUS at 19:59

## 2020-06-15 RX ADMIN — CHLORHEXIDINE GLUCONATE SCH ML: 1.2 RINSE ORAL at 19:59

## 2020-06-15 RX ADMIN — NOREPINEPHRINE BITARTRATE SCH MLS/HR: 1 INJECTION, SOLUTION, CONCENTRATE INTRAVENOUS at 03:31

## 2020-06-15 RX ADMIN — PROPOFOL SCH MLS/HR: 10 INJECTION, EMULSION INTRAVENOUS at 09:44

## 2020-06-15 RX ADMIN — HEPARIN SODIUM SCH: 5000 INJECTION, SOLUTION INTRAVENOUS; SUBCUTANEOUS at 00:46

## 2020-06-15 RX ADMIN — METRONIDAZOLE SCH MLS/HR: 500 INJECTION, SOLUTION INTRAVENOUS at 23:42

## 2020-06-15 RX ADMIN — SODIUM CHLORIDE SCH MLS/HR: 900 INJECTION, SOLUTION INTRAVENOUS at 12:43

## 2020-06-15 RX ADMIN — IPRATROPIUM BROMIDE AND ALBUTEROL SULFATE SCH ML: .5; 3 SOLUTION RESPIRATORY (INHALATION) at 15:55

## 2020-06-15 RX ADMIN — IPRATROPIUM BROMIDE AND ALBUTEROL SULFATE SCH ML: .5; 3 SOLUTION RESPIRATORY (INHALATION) at 11:25

## 2020-06-15 RX ADMIN — METRONIDAZOLE SCH MLS/HR: 500 INJECTION, SOLUTION INTRAVENOUS at 12:34

## 2020-06-15 RX ADMIN — PIPERACILLIN AND TAZOBACTAM SCH MLS/HR: 3; .375 INJECTION, POWDER, FOR SOLUTION INTRAVENOUS at 19:59

## 2020-06-15 RX ADMIN — PROPOFOL SCH MLS/HR: 10 INJECTION, EMULSION INTRAVENOUS at 18:27

## 2020-06-15 RX ADMIN — CEFAZOLIN SCH MLS/HR: 330 INJECTION, POWDER, FOR SOLUTION INTRAMUSCULAR; INTRAVENOUS at 11:50

## 2020-06-15 RX ADMIN — METRONIDAZOLE SCH MLS/HR: 500 INJECTION, SOLUTION INTRAVENOUS at 05:46

## 2020-06-15 RX ADMIN — PIPERACILLIN AND TAZOBACTAM SCH: 3; .375 INJECTION, POWDER, FOR SOLUTION INTRAVENOUS at 00:48

## 2020-06-15 NOTE — P.PN
<Roula Bailey NATALIA - Last Filed: 06/15/20 13:16>





Subjective


Progress Note Date: 06/15/20





CHIEF COMPLAINT: Abdominal pain





HISTORY OF PRESENT ILLNESS: 59-year-old female who underwent exploratory 

laparotomy with sigmoid colectomy, descending colostomy creation, and 

appendectomy with Dr. Reed on June 14-20.  Patient examined at the bedside 

with Dr. Reed.  Patient remains in intensive care unit.  She is on 

mechanical ventilation. Fio2 60%.  Patient remains hypotensive at the time of 

examination.  She is on levophed at 0.3 mcg/kg/min.  She has received 6L in IV 

fluid bolus. She is tachycardic with a heart rate in the 120s. Temperature 

101.2. Nursing reports minimal urine output. Creatinine increased from 0.53 to 

0.85





PHYSICAL EXAM: 


VITAL SIGNS: Reviewed


GENERAL: Well-developed in no acute distress-sedated on mechanical ventilation. 


HEENT:  No sclera icterus. Extraocular movements grossly intact.  Moist buccal 

mucosa. 


Head is atraumatic, normocephalic. No nasal drainage.


NECK:  Supple without lymphadenopathy.


CHEST:  Non-labored respirations and equal bilateral excursions-remains on 

ventilator. 


CARDIOVASCULAR:  Tachycardic.  Regular rate with regular rhythm.  Palpable 2+ 

radial pulses.


ABDOMEN:  Soft.  Nondistended. PREVENA wound vac noted. Ostomy to left side of 

abdomen. No stool or gas noted. Stoma pink. TERESSA drain with serosanguineous 

drainage.


MUSCULOSKELETAL:  No clubbing or cyanosis.


NEUROLOGIC:  Sedated on mechanical ventilation


PSYCH:  Unable to assess secondary to mechanical ventilation


SKIN: Well perfused.  Good skin turgor. 





ASSESSMENT: 


1.  Abdominal pain secondary to ruptured sigmoid colon, fecal peritonitis, 

descending and sigmoid colon impaction, appendicolith with appendicitis





PLAN: 


Dr. Reed examined patient at the bedside. Continue IV fluids as ordered. C

ontinue levophed. Wean as tolerated to maintain MAP greater than 65. Check 

cortisol level. Consult cardiology for evaluation. Check troponin levels. 

Consult nephrology secondary to oliguria. NO NG/OG tube secondary to history of 

gastric bypass. Will consider TPN if patient unable to be extubated within the 

day or two. Continue antibiotics. Monitor labs. Further ICU and ventilator 

management per Dr. Castaneda.





Nurse practitioner note has been reviewed by physician. Signing provider agrees 

with the documented findings, assessment, and plan of care. 








Objective





- Vital Signs


Vital signs: 


                                   Vital Signs











Temp  101.2 F H  06/15/20 12:00


 


Pulse  125 H  06/15/20 13:00


 


Resp  21   06/15/20 13:00


 


BP  104/79   06/15/20 13:00


 


Pulse Ox  88 L  06/15/20 12:00








                                 Intake & Output











 06/14/20 06/15/20 06/15/20





 18:59 06:59 18:59


 


Intake Total  7177.619 1907.296


 


Output Total  665 185


 


Balance  6512.619 1722.296


 


Weight 68.039 kg 67 kg 


 


Intake:   


 


  IV  7050 1700


 


    LR Bolus   1000


 


    Sodium Chloride 0.9% 1,  400 700





    000 ml @ 100 mls/hr IV .   





    Q10H ABIGAIL Rx#:[id number]   


 


    Sodium Chloride 0.9% 1,  3000 





    000 ml @ 999 mls/hr IV .   





    Q1H1M ONE Rx#:[id number]   


 


  Intake, IV Titration  127.619 207.296





  Amount   


 


    Norepinephrine 32 mg In  27.067 58.008





    Sodium Chloride 0.9% 218   





    ml @ 0.05 MCG/KG/MIN 1.   





    595 mls/hr IV .Q24H ABIGAIL   





    Rx#:[id number]   


 


    Propofol 1,000 mg In  100.552 58.893





    Empty Bag 1 bag @ Titrate   





    IV .Q0M ABIGAIL Rx#:   





    [id number]   


 


    fentaNYL (PF) 1,000 mcg   90.395





    In Sodium Chloride 0.9%   





    80 ml @ Per Protocol IV .   





    Q0M ABIGAIL Rx#:[id number]   


 


Output:   


 


  Drainage  45 100


 


    Right Lower Abdomen  45 100


 


  Urine  545 85


 


  Estimated Blood Loss  75 


 


Other:   


 


  Voiding Method  Indwelling Catheter 








                       ABP, PAP, CO, CI - Last Documented











Arterial Blood Pressure        106/75

















- Labs


CBC & Chem 7: 


                                 06/15/20 04:40





                                 06/15/20 04:40


Labs: 


                  Abnormal Lab Results - Last 24 Hours (Table)











  06/14/20 06/15/20 06/15/20 Range/Units





  23:42 00:03 01:49 


 


RBC     (3.80-5.40)  m/uL


 


Hgb     (11.4-16.0)  gm/dL


 


Hct     (34.0-46.0)  %


 


RDW     (11.5-15.5)  %


 


Lymphocytes # (Manual)     (1.0-4.8)  k/uL


 


ABG pH   7.17 L*   (7.35-7.45)  


 


ABG pCO2   63 H   (35-45)  mmHg


 


ABG pO2   130 H   ()  mmHg


 


ABG HCO3     (21-25)  mmol/L


 


Chloride     ()  mmol/L


 


Carbon Dioxide     (22-30)  mmol/L


 


BUN     (7-17)  mg/dL


 


Glucose     (74-99)  mg/dL


 


POC Glucose (mg/dL)  130 H    (75-99)  mg/dL


 


Calcium     (8.4-10.2)  mg/dL


 


Phosphorus     (2.5-4.5)  mg/dL


 


Magnesium     (1.6-2.3)  mg/dL


 


Troponin I     (0.000-0.034)  ng/mL


 


Total Protein     (6.3-8.2)  g/dL


 


Albumin     (3.5-5.0)  g/dL


 


Urine Appearance    Cloudy H  (Clear)  


 


Ur Specific Gravity    1.045 H  (1.001-1.035)  


 


Urine Protein    1+ H  (Negative)  


 


Urine Ketones    Trace H  (Negative)  


 


Urine Blood    Moderate H  (Negative)  


 


Urine RBC    >182 H  (0-5)  /hpf


 


Urine WBC    13 H  (0-5)  /hpf


 


Urine Bacteria    Rare H  (None)  /hpf


 


Hyaline Casts    13 H  (0-2)  /lpf


 


Urine Mucus    Occasional H  (None)  /hpf














  06/15/20 06/15/20 06/15/20 Range/Units





  04:40 04:40 05:02 


 


RBC  5.65 H    (3.80-5.40)  m/uL


 


Hgb  17.6 H    (11.4-16.0)  gm/dL


 


Hct  55.5 H    (34.0-46.0)  %


 


RDW  16.7 H    (11.5-15.5)  %


 


Lymphocytes # (Manual)  0.68 L    (1.0-4.8)  k/uL


 


ABG pH    7.30 L  (7.35-7.45)  


 


ABG pCO2     (35-45)  mmHg


 


ABG pO2     ()  mmHg


 


ABG HCO3    18 L  (21-25)  mmol/L


 


Chloride   117 H   ()  mmol/L


 


Carbon Dioxide   18 L   (22-30)  mmol/L


 


BUN   21 H   (7-17)  mg/dL


 


Glucose   137 H   (74-99)  mg/dL


 


POC Glucose (mg/dL)     (75-99)  mg/dL


 


Calcium   7.4 L   (8.4-10.2)  mg/dL


 


Phosphorus   5.0 H   (2.5-4.5)  mg/dL


 


Magnesium   2.5 H   (1.6-2.3)  mg/dL


 


Troponin I     (0.000-0.034)  ng/mL


 


Total Protein   4.1 L   (6.3-8.2)  g/dL


 


Albumin   2.1 L   (3.5-5.0)  g/dL


 


Urine Appearance     (Clear)  


 


Ur Specific Gravity     (1.001-1.035)  


 


Urine Protein     (Negative)  


 


Urine Ketones     (Negative)  


 


Urine Blood     (Negative)  


 


Urine RBC     (0-5)  /hpf


 


Urine WBC     (0-5)  /hpf


 


Urine Bacteria     (None)  /hpf


 


Hyaline Casts     (0-2)  /lpf


 


Urine Mucus     (None)  /hpf














  06/15/20 06/15/20 06/15/20 Range/Units





  05:54 08:41 10:17 


 


RBC     (3.80-5.40)  m/uL


 


Hgb     (11.4-16.0)  gm/dL


 


Hct     (34.0-46.0)  %


 


RDW     (11.5-15.5)  %


 


Lymphocytes # (Manual)     (1.0-4.8)  k/uL


 


ABG pH   7.27 L   (7.35-7.45)  


 


ABG pCO2     (35-45)  mmHg


 


ABG pO2     ()  mmHg


 


ABG HCO3   18 L   (21-25)  mmol/L


 


Chloride     ()  mmol/L


 


Carbon Dioxide     (22-30)  mmol/L


 


BUN     (7-17)  mg/dL


 


Glucose     (74-99)  mg/dL


 


POC Glucose (mg/dL)  126 H    (75-99)  mg/dL


 


Calcium     (8.4-10.2)  mg/dL


 


Phosphorus     (2.5-4.5)  mg/dL


 


Magnesium     (1.6-2.3)  mg/dL


 


Troponin I    0.108 H*  (0.000-0.034)  ng/mL


 


Total Protein     (6.3-8.2)  g/dL


 


Albumin     (3.5-5.0)  g/dL


 


Urine Appearance     (Clear)  


 


Ur Specific Gravity     (1.001-1.035)  


 


Urine Protein     (Negative)  


 


Urine Ketones     (Negative)  


 


Urine Blood     (Negative)  


 


Urine RBC     (0-5)  /hpf


 


Urine WBC     (0-5)  /hpf


 


Urine Bacteria     (None)  /hpf


 


Hyaline Casts     (0-2)  /lpf


 


Urine Mucus     (None)  /hpf














  06/15/20 Range/Units





  11:37 


 


RBC   (3.80-5.40)  m/uL


 


Hgb   (11.4-16.0)  gm/dL


 


Hct   (34.0-46.0)  %


 


RDW   (11.5-15.5)  %


 


Lymphocytes # (Manual)   (1.0-4.8)  k/uL


 


ABG pH   (7.35-7.45)  


 


ABG pCO2   (35-45)  mmHg


 


ABG pO2   ()  mmHg


 


ABG HCO3   (21-25)  mmol/L


 


Chloride   ()  mmol/L


 


Carbon Dioxide   (22-30)  mmol/L


 


BUN   (7-17)  mg/dL


 


Glucose   (74-99)  mg/dL


 


POC Glucose (mg/dL)  101 H  (75-99)  mg/dL


 


Calcium   (8.4-10.2)  mg/dL


 


Phosphorus   (2.5-4.5)  mg/dL


 


Magnesium   (1.6-2.3)  mg/dL


 


Troponin I   (0.000-0.034)  ng/mL


 


Total Protein   (6.3-8.2)  g/dL


 


Albumin   (3.5-5.0)  g/dL


 


Urine Appearance   (Clear)  


 


Ur Specific Gravity   (1.001-1.035)  


 


Urine Protein   (Negative)  


 


Urine Ketones   (Negative)  


 


Urine Blood   (Negative)  


 


Urine RBC   (0-5)  /hpf


 


Urine WBC   (0-5)  /hpf


 


Urine Bacteria   (None)  /hpf


 


Hyaline Casts   (0-2)  /lpf


 


Urine Mucus   (None)  /hpf








                      Microbiology - Last 24 Hours (Table)











 06/14/20 23:00 Gram Stain - Preliminary





 Abdomen Wound Culture - Preliminary


 


 06/14/20 23:00 Gram Stain - Preliminary





 Abdomen Wound Culture - Preliminary


 


 06/14/20 23:54 Gram Stain - Preliminary





 Sputum Sputum Culture - Preliminary


 


 06/14/20 23:00 Anaerobic Culture - Preliminary





 Abdomen 


 


 06/14/20 23:00 Anaerobic Culture - Preliminary





 Abdomen 


 


 06/15/20 01:49 Urine Culture - Preliminary





 Urine,Voided 














<Socorro Reed - Last Filed: 06/26/20 02:55>





Subjective


Patient seen and evaluated with nurse practitioner.  Additional findings include

new oliguria as well as fevers.  Hemoglobin continues to increase.  Patient has 

history of moderate long-standing chronic tobacco abuse.  Patient preoperatively

had been hypotensive including tachycardic consistent with severe dehydration.  

Recommend consultation to nephrology for oliguria.  Additionally, patient is 

profoundly tachycardic.  Recommend check troponin levels for risk of myocardial 

event.  Patient may have pre-existing history of cardiac disease secondary to 

her lack of general medical care.  Additionally, patient has history of gastric 

bypass with iatrogenic malnutrition.  We'll consider parenteral nutrition 

pending ventilatory status.





Objective





- Vital Signs


Vital signs: 


                                   Vital Signs











Temp  98.1 F   06/26/20 00:00


 


Pulse  79   06/26/20 02:00


 


Resp  22   06/26/20 02:00


 


BP  95/74   06/25/20 20:00


 


Pulse Ox  96   06/26/20 02:00








                                 Intake & Output











 06/25/20 06/25/20 06/26/20





 06:59 18:59 06:59


 


Intake Total 1300 2538.2 876


 


Output Total 1815 2300 1300


 


Balance -515 238.2 -424


 


Weight 79 kg  


 


Intake:   


 


  IV 1300 1226 876


 


    Ampicillin-Sulbactam 3 gm 200 100 100





    In Sodium Chloride 0.9%   





    100 ml @ 200 mls/hr IVPB   





    Q6HR ABIGAIL Rx#:[id number]   


 


    Fat Emulsion 20% 250 mL@  126 126





    20.833mls/hr   


 


    Mvi, Adult No.4 with Vit 660 660 





    K 10 ml Trace (Conc-1Ml/   





    Dose) 1 ml Potassium   





    Chloride 60 meq Potassium   





    Phosphate 30 mmol   





    Magnesium Sulfate gm 0.6   





    gm Calcium Gluconate 1 gm   





    In Amino Acid 5%-D15w 1,   





    000 ml @ 55 mls/hr IV .   





    [id number] ABIGAIL Rx#:[id number]   


 


    Mvi, Adult No.4 with Vit   385





    K 10 ml Trace (Conc-1Ml/   





    Dose) 1 ml Potassium   





    Chloride 60 meq Potassium   





    Phosphate 30 mmol   





    Magnesium Sulfate gm 0.6   





    gm Calcium Gluconate 1 gm   





    In Amino Acid 5%-D15w 1,   





    000 ml @ 55 mls/hr IV .   





    [id number] ABIGAIL Rx#:[id number]   


 


    Sodium Chloride 0.45% 1, 240 240 160





    000 ml @ 20 mls/hr IV .   





    Q24H ABIGAIL Rx#:[id number]   


 


    Sodium Chloride 0.9% 1,00   5





    mL   


 


    metroNIDAZOLE-NS  200 100 100





    mg   


 


  Intake, IV Titration  1062.2 





  Amount   


 


    Mvi, Adult No.4 with Vit  1062.2 





    K 10 ml Trace (Conc-1Ml/   





    Dose) 1 ml Potassium   





    Chloride 60 meq Potassium   





    Phosphate 30 mmol   





    Magnesium Sulfate gm 0.6   





    gm Calcium Gluconate 1 gm   





    In Amino Acid 5%-D15w 1,   





    000 ml @ 55 mls/hr IV .   





    [id number] [facility] Rx#:[id number]   


 


  Oral  250 


 


Output:   


 


  Drainage 40  


 


    Right Lower Abdomen 40  


 


  Urine 1575 1450 1200


 


  Stool 200 850 100


 


Other:   


 


  Voiding Method Indwelling Catheter Indwelling Catheter Indwelling Catheter








                       ABP, PAP, CO, CI - Last Documented











Arterial Blood Pressure        89/47

















- Labs


CBC & Chem 7: 


                                 06/25/20 05:25





                                 06/25/20 05:25


Labs: 


                  Abnormal Lab Results - Last 24 Hours (Table)











  06/25/20 06/25/20 06/25/20 Range/Units





  05:25 05:25 17:33 


 


WBC   13.7 H   (3.8-10.6)  k/uL


 


RBC   3.79 L   (3.80-5.40)  m/uL


 


RDW   17.0 H   (11.5-15.5)  %


 


Neutrophils #   11.6 H   (1.3-7.7)  k/uL


 


Sodium  133 L    (137-145)  mmol/L


 


Creatinine  0.34 L    (0.52-1.04)  mg/dL


 


Glucose  114 H    (74-99)  mg/dL


 


POC Glucose (mg/dL)    101 H  (75-99)  mg/dL


 


Calcium  8.1 L    (8.4-10.2)  mg/dL








                      Microbiology - Last 24 Hours (Table)











 06/24/20 13:50 Blood Culture - Preliminary





 Blood    No Growth after 24 hours


 


 06/19/20 05:10 Blood Culture - Final





 Blood    No Growth after 144 hours














Assessment and Plan


(1) Peritonitis (acute) generalized


Current Visit: Yes   Status: Acute   Code(s): K65.0 - GENERALIZED (ACUTE) 

PERITONITIS   SNOMED Code(s): 76729867


   





(2) History of gastric bypass


Current Visit: Yes   Status: Acute   Code(s): Z98.84 - BARIATRIC SURGERY STATUS 

 SNOMED Code(s): 938175402


   





(3) Medical non-compliance


Current Visit: Yes   Status: Acute   Code(s): Z91.19 - PATIENT'S NONCOMPLIANCE W

OTH MEDICAL TREATMENT AND REGIMEN   SNOMED Code(s): 933928543


   





(4) Tobacco abuse


Current Visit: Yes   Status: Acute   Code(s): Z72.0 - TOBACCO USE   SNOMED 

Code(s): 753412759


   





(5) Tobacco abuse counseling


Current Visit: Yes   Status: Acute   Code(s): Z71.6 - TOBACCO ABUSE COUNSELING  

SNOMED Code(s): 476404372


   





(6) Depressive disorder


Current Visit: Yes   Status: Acute   Code(s): F32.9 - MAJOR DEPRESSIVE DISORDER,

SINGLE EPISODE, UNSPECIFIED   SNOMED Code(s): 08707248


   





(7) Hypothyroidism


Current Visit: Yes   Status: Acute   Code(s): E03.9 - HYPOTHYROIDISM, 

UNSPECIFIED   SNOMED Code(s): 96251339


   





(8) Abdominal pain


Current Visit: Yes   Status: Acute   Code(s): R10.9 - UNSPECIFIED ABDOMINAL PAIN

  SNOMED Code(s): 01129277


   





(9) Free intraperitoneal air


Current Visit: Yes   Status: Acute   Code(s): K66.8 - OTHER SPECIFIED DISORDERS 

OF PERITONEUM   SNOMED Code(s): 00169543


   





(10) Dehydration


Current Visit: Yes   Status: Acute   Code(s): E86.0 - DEHYDRATION   SNOMED 

Code(s): 76558155

## 2020-06-15 NOTE — XR
EXAMINATION TYPE: XR chest 1V portable

 

DATE OF EXAM: 6/15/2020

 

CLINICAL HISTORY: Difficulty breathing progress study.  

 

TECHNIQUE: Single AP portable supine view of the chest is obtained.

 

COMPARISON: Chest x-ray from earlier today.

 

FINDINGS:  Stable endotracheal tube and right internal jugular central venous catheter. Persistent le
ft basilar opacity. Right lung remains clear. Cardiac silhouette size is stable and within normal molina
its. Osseous structures are intact.

 

IMPRESSION:  Overall stable findings,  persistent left basilar acute infiltrate and/or atelectasis an
d probable small left pleural effusion.

## 2020-06-15 NOTE — CONS
CONSULTATION



CRITICAL CARE CONSULTATION:

Bianca 15, 2020



59-year-old female who apparently came to the emergency room on  at 11:56.

She is a 59-year-old female because of abdominal pain, that had been going on for about

a day or so prior to admission.  The pain was apparently crampy initially in nature.

It was more generalized when she was in the emergency room.  She did have nausea

without vomiting.  She apparently also had a small bowel movement the morning of her

evaluation in the ER.  She described her pain as being severe.  She also had abdominal

distention.  She had no fever or chills.  There was no chest pain or chest discomfort.

Likewise, there was no shortness of breath or difficulty breathing.  The patient was

thought to have a large bowel obstruction.  She underwent exploratory laparotomy,

appendectomy, colectomy with colostomy.  That surgery was done by Dr. Reed on the

.  Her postoperative diagnosis was ruptured sigmoid colon, fecal peritonitis,

descending and sigmoid colon impaction, appendicolith with appendicitis.  The

procedures that she documented in her medical record include exploratory laparotomy

with sigmoid colectomy, descending colostomy with Roselia's procedure, disimpaction of

descending colon and sigmoid colon, open appendectomy, abdominal washout with 6 L of

saline, placement of a right lower pelvic brown #19 Jimenez-Rincon drain and application

of an incisional wound VAC system.



Currently, the patient is on the mechanical ventilator.  Today is postop day #1.  She

is on the volume assist-control mode rate of 26, tidal volume 350, FiO2 80%, PEEP of 5.

Gases showed a pO2 of 93, pCO2 of 36 and a pH of 7.3.  She is currently on Diprivan at

60 mcg/kg per minute, norepinephrine at 22 mcg/minute and fentanyl at 2.5 mcg/kg per

hour.  She has also had in saline at 100 mL an hour.  She apparently has had no urine

output all night.  The PEEP was increased from 5 to 10 this morning by myself.  We will

repeat a blood gas in 1 hour.  We are going to give her another L of LR.  Also, her

abdomen is distended.  Her urine output is poor and her blood pressure is low so we

were concerned about the abdominal compartment syndrome.  In addition, with hypoxemia,

there is certainly concern about early acute lung injury and developing ARDS.



HOME MEDICATIONS:

Include baclofen, Prozac, Synthroid, Inderal, Maxalt MSLT, Fioricet, chlorthalidone,

cholecalciferol, multivitamins.



ALLERGIES:

Include LATEX AND SULFA ANTIBIOTICS.



PAST MEDICAL HISTORY:

Apparently positive for hypothyroidism.  No other medical problems are listed.



SURGICAL HISTORY:

Includes bariatric surgery, , cholecystectomy, and tonsillectomy.



SOCIAL HISTORY:

Positive for ongoing tobacco use on a daily basis.  Apparently no alcohol or illicit

drug use.



The rest of the history is not known.



FAMILY HISTORY:

Not documented.  She is currently on the ventilator, so no family history could be

obtained.



REVIEW OF SYSTEMS:

Could not be obtained.  She is currently on the ventilator.  From yesterday when she

came into the emergency room, it appears that she had one day of abdominal discomfort

which apparently progressed as well as nausea without vomiting.



PHYSICAL EXAMINATION:

VITAL SIGNS: Current vital signs are reviewed.  Temperature is 100.1, heart rate 110,

respiratory rate 26, blood pressure 100/76, mean 84 and saturations are in the mid to

high 90s.

Appears in no acute distress, currently sedated both on propofol and fentanyl.

HEENT: Examination is grossly unremarkable.  There is an orally placed endotracheal

tube.

NECK:  Supple.  Neck veins are flat.  No adenopathy.

CARDIOVASCULAR: Examination reveals tachycardia.  Heart rate 110, S1, S2 normal.  Heart

sounds are distant.  No distinct murmur.

LUNGS:  Scattered coarse rhonchi.  No crackles.  No wheezes.  Breath sounds equal.

ABDOMEN is distended.  No bowel sounds are noted.

EXTREMITIES are intact.  No significant edema.

SKIN: Without rash.

NEUROLOGIC: Examination could not be adequately completed given the amount of sedation

that she is currently on.



LABS:

Reviewed.  White count 6.8, hemoglobin 17.6, hematocrit 55.5, platelet count 341,000.

Blood gases have already been noted.  The repeat blood gases on 10 of PEEP show pO2 of

103, pCO2 of 39, and pH is 7.27.  The FiO2 was going to be dropped from 80-70 percent.

Sodium 138, potassium 4, chloride 117, carbon dioxide 18, chloride 117, anion gap is 3,

BUN and creatinine were 21 and 0.85.  Calcium 7.4, phosphorus 5, magnesium 2.5, albumin

2.1.  Urine is cloudy.  Specific gravity 1.045, 1+ protein, trace ketones, moderate

blood, greater than 182 RBCs, 13 WBCs, rare bacteria.



Microbiology is currently pending or negative.



The patient had a chest x-ray early this morning which showed a mild infiltrate and

atelectasis at the left lung base.  Her repeat chest x-ray

this morning shows again some atelectasis or infiltrate at the left lung base.  There

also may be a small effusion on the left side.



Medications are reviewed.  Currently, she is on chlorhexidine, fentanyl, heparin,

updrafts, metronidazole, morphine p.r.n., Narcan, norepinephrine, Protonix, Zosyn,

propofol and saline IV.



ASSESSMENT:

1. Postoperative day #1 status post exploratory laparotomy with sigmoid colectomy for

    perforated sigmoid colon, Roselia's procedure, disimpaction of descending colon,

    open appendectomy, abdominal washout for fecal peritonitis, placement of a Jimenez-

    Rincon drain, and application of an incisional wound VAC system.

2. History of hypothyroidism.

3. History of ongoing tobacco use with nicotine addiction.



PLAN:

Currently, the patient is maintained on the ventilator.  The PEEP was increased from 5-

10 and FiO2 dropped down to 70%.  We will check a urinary bladder pressure to see if

the patient might have an abdominal compartment syndrome.  We will scan the bladder for

any residual urine.  She does have a Caldera in place.  We will give her another L of LR.

Repeat an ABG.  Additional recommendations and suggestions are forthcoming.  Obviously

we are concerned about early acute lung injury and/or ARDS given the fecal peritonitis.

Additional recommendations and suggestions are forthcoming.  Prognosis is guarded.





MMODL / IJN: [id number] / Job#: [id number]

## 2020-06-15 NOTE — US
EXAMINATION TYPE: US renals and bladder

 

DATE OF EXAM: 6/15/2020

 

COMPARISON: CT from yesterday

 

CLINICAL HISTORY: anuric.

 

EXAM MEASUREMENTS:

 

Right Kidney:  9.6 x 5.2 x 5.4 cm

Left Kidney: not visualized 

 

 

Limited exam due to patient being in ICU and not being able to cooperate for exam.

 

Right Kidney: limited visualization of lower pole due to overlying bowel gas  

Left Kidney: not visualized due to bowel gas  

Bladder: not visualized.

 

 

Markedly suboptimal study. Only upper to mid pole of right kidney visualized without gross hydronephr
osis. Left kidney not identified on images saved appeared normal in size and CT one day earlier. Note
 is made of 2 to 3 mm nonobstructing calculus upper to midpole left kidney coronal image 62. Bladder 
is collapsed and thus suboptimally evaluated

 

 

 

IMPRESSION: Suboptimal study. No gross right-sided hydronephrosis.

## 2020-06-15 NOTE — XR
EXAMINATION TYPE: XR chest 1V

 

DATE OF EXAM: 6/15/2020

 

COMPARISON: NONE

 

HISTORY: Short of breath. Intubated

Respiratory failure

TECHNIQUE:

 

FINDINGS: The endotracheal tube is 4 cm from the kishor. Right jugular catheter has tip in the superi
or vena cava. There is some linear density at the left lung base. There is no heart failure. Heart si
ze is normal. Bony thorax is intact.

 

IMPRESSION:

There is some mild infiltrate and atelectasis left lung base. No heart failure seen.

## 2020-06-15 NOTE — P.OP
Date of Procedure: 06/14/20


Description of Procedure: 





SURGEON:  AMINA DE LA TORRE MD





PREOPERATIVE DIAGNOSES:  


1.  Pneumoperitoneum with peritonitis


2.  Chronic constipation


3.  History of gastric bypass


4.  Chronic tobacco abuse


5.  Hypertensive heart disease


6.  Lack of general medical care, intentional


7.  Medical noncompliance to bariatric care


8.  Hypothyroidism


9.  Depressive disorder


10.  Hypotension present prior to surgery


11.  Severe dehydration present prior to surgery








POSTOPERATIVE DIAGNOSES:


1.  Pneumoperitoneum with fecal peritonitis


2.  Chronic constipation


3.  History of gastric bypass


4.  Chronic tobacco abuse


5.  Hypertensive heart disease


6.  Lack of general medical care, intentional


7.  Medical noncompliance to bariatric care


8.  Hypothyroidism


9.  Depressive disorder


10.  Perforated sigmoid colon due to ulceration from hard stool


11.  Fecal peritonitis


12.  Descending and sigmoid colon stool impaction


13.  Appendicolith with impaction in appendix


14.  Hypotension present prior to surgery


15.  Severe dehydration present prior to surgery








Procedure(s) Performed: 


1.  Exploratory laparotomy with sigmoid colectomy for perforated sigmoid colon 

due to stool ulceration


2.  Descending colostomy creation for Allison's procedure


3.  Disimpaction of descending colon and sigmoid colon of concretions of large 

stool


4.  Open appendectomy for appendicolith


5.  Abdominal washout 6 L normal saline for fecal peritonitis


6.  Placement of right lower pelvis round #19 TERESSA drain


7.  Application of incisional wound VAC system, 20 cm, PREVENA





COMPLICATIONS: None. 





Anesthesia: GETA


Estimated Blood Loss (ml): 75


Pathology: other (1.  Sigmoid colon 2.  Disimpacted colon contents 3.  Aerobic 

anaerobic culture peritoneal fluid 4.  Appendix with appendicolith)


Condition: critical


Disposition: ICU





Operative Findings: 


1.  Full-thickness rupture along distal sigmoid colon with over 2 cm rupture 

along the right anterior lateral sigmoid colon/mesentery


2.  Fecal peritonitis, 500 mL drained from the abdomen


3.  Impacted descending colon sigmoid colon with over 5 cm large concrete stool,

disimpaction of 1.5 feet of colon performed


4.  No internal hernias from gastric bypass


5.  Entire small bowel viable


6.  Abdomen irrigated with 6 L normal saline until irrigant completely clear


7.  Wound #19 TERESSA drain in pelvis at the rectal stump tacked with 2-0 Prolene


8.  Descending colostomy creation via left lower abdominal wall


9.  Redundant descending and sigmoid colon identified


10.  1 cm appendicolith impacted along appendix with appendectomy performed





INDICATIONS: The patient is a 59-year-old female who presented to the emergency 

room with acute onset abdominal pain.  Additional history includes history of 

gastric bypass including change in bowel habits ongoing for over 1 month with 

constipation.  Patient reports not seeking any general medical care for gastric 

bypass or current problems.  Additional diagnostic studies were consistent with 

perforated viscus.  Patient also had peritonitis exam.  Emergent surgical 

intervention with exploratory laparotomy, possible ostomy, including benefits 

risks but not limited to bleeding, infection, need for further surgery, 

postoperative recovery in the intensive care unit, prolonged ventilation were 

described in detail to the patient and her significant other at bedside.  All 

questions were answered and risks were reviewed with the patient. Informed 

consent was obtained. 





DESCRIPTION: Patient was brought into the operating room where she presented 

with pre-existing hypotension including tachycardia.  


After general induction, additional lines per anesthesia were placed. The 

abdomen had been prepped and draped in the standard sterile 


fashion. Caldera catheter was placed and nasogastric tube was avoided with history

of gastric bypass. Ioban


draping was also placed to minimize any contamination to the skin. 





After timeout protocol was performed, the abdomen was entered using #10 blade 

whereby an incision was made from the xiphoid to the 


pubis.  The abdomen was inspected whereby the small bowel was unremarkable.  Her

gastric bypass was inspected without evidence of internal hernias along 

Guerra's defect or jejunojejunostomy mesentery.  The small bowel was viable.  

 The entire colon was filled with firm stool. Moderate hard very large 

concretions of stool greater than 4 cm were palpated along  the distal 

transverse, descending, and sigmoid colon.  Separately, the liver surface was 

palpated and unremarkable.  


No peritoneal studding was identified.  No diverticular disease was found along 

the colon.  





Next, self-retaining Kiki retractor was placed with a bladder blade. The 

descending colon and sigmoid colon were


mobilized along the medial and lateral attachments with care to avoid any injury

to the ureters along the usual anatomical landmarks.


Carefully the sigmoid colon was identified and mobilized.  The descending and 

sigmoid colon was moderately redundant.  Emphysematous changes were identified 

along the sigmoid colon consistent with nearby perforation.  Localized 500 mL of

gross fecal peritonitis was found with a large over 2 cm ulceration along the 

right anterior lateral mesenteric portion of the mid sigmoid colon consistent 

with rupture from persistent stool ulceration.  A large 2.5 cm stool was found 

near the perforation site.  Next with the severity of impaction involving the 

descending colon and sigmoid colon, the colon was dis-impacted of hard concrete 

stool some over 4-5 cm in size and milked through the perforation site.  Aerobic

and anaerobic cultures were obtained for specimen.  Next, the abdomen was 

irrigated with 6 L normal saline until the irrigant was completely clear.  





The sigmoid colon was mobilized along the mesentery using Enseal where a window 

was made along the mesentery and the colon was divided such that the


descending colon was prepared for maturation of a colostomy. Next, the rest of 

the colon was mobilized down to the rectum. The colon was divided proximally and

distally along the sigmoid colon encompassing the perforation site using [employer]

en Endo ZUHAIR 60-mm purple staples.  The retained rectal stump was tacked using 2-

0 Prolene.  The rest of the colon was inspected where a palpable 1 cm 

appendicolith was impacted along the appendix.  An appendectomy was performed 

using after controlling the mesentery and divided on the base using 60 mm purple

staple load.  A round #19 Jimenez-Rincon drain was positioned at the rectal 

stump. 





Next, attention was brought to the delivering and creating of the descending 

colostomy. A point along the


abdominal wall and rectus muscle was selected for the colostomy. Kocher was used

to elevate the skin and a #10 blade was taken across in


tangential manner to create the skin defect of approximately quarter-size. The 

fat of the skin was mobilized using a small rich. The


rectus muscle was identified and scored in cruciate fashion using electro- Bovie

cautery. Next, using a muscle-splitting technique with a


hemostat, the peritoneum was entered. The peritoneum was widened such that 2 

fingerbreadths could easily pass for delivering and evaginating


the descending portion of the colon through the skin. 





The abdominal cavity was copiously irrigated as described until completely 

clear. Round number #19 Harlan


drain was entered along the right lower abdomen and exited through the


skin. The drain was placed along the pelvis as all irrigation fluid was


accounted for. Next, the TERESSA drain was tacked along the skin using a 2-0


nylon. 





The midline incision was closed using double-stranded 0 PDS. Next, the 

subcutaneous


tissue was copiously irrigated with normal saline and hydrogen peroxide. About 

the


umbilicus interrupted 3-0 Vicryl dermal sutures were placed as to avoid


any staples around the umbilicus. For the rest of the incision, stainless


steel skin staples were applied. 





The midline incision was covered using PREVENA wound VAC and attention was 

brought to maturation


of the colostomy. The staple edge was divided and removed. Next quadrant


sutures at 12 o'clock, 3 o'clock, 6 o'clock, and 9 o'clock position was


made using serosa, mucosal and  dermal bites using 2-0 Vicryl.


Interrupted 3-0 Vicryl was placed in between all quadrants sutures to completely

mature the ostomy.


Hemostasis was checked. A Coloplast was then placed. 





At the end of the procedure, needle, sponge, and instrument count had


been verified correct by surgical technician. 





The patient's family was updated on level of care.  The patient was transferred 

to intensive care unit in critical condition

## 2020-06-16 LAB
ANION GAP SERPL CALC-SCNC: 7 MMOL/L
BUN SERPL-SCNC: 35 MG/DL (ref 7–17)
CALCIUM SPEC-MCNC: 6.9 MG/DL (ref 8.4–10.2)
CELLS COUNTED: 200
CHLORIDE SERPL-SCNC: 116 MMOL/L (ref 98–107)
CO2 BLDA-SCNC: 19 MMOL/L (ref 19–24)
CO2 SERPL-SCNC: 17 MMOL/L (ref 22–30)
ERYTHROCYTE [DISTWIDTH] IN BLOOD BY AUTOMATED COUNT: 5 M/UL (ref 3.8–5.4)
ERYTHROCYTE [DISTWIDTH] IN BLOOD: 17.2 % (ref 11.5–15.5)
GLUCOSE BLD-MCNC: 100 MG/DL (ref 75–99)
GLUCOSE BLD-MCNC: 95 MG/DL (ref 75–99)
GLUCOSE SERPL-MCNC: 101 MG/DL (ref 74–99)
HCO3 BLDA-SCNC: 18 MMOL/L (ref 21–25)
HCT VFR BLD AUTO: 49.1 % (ref 34–46)
HGB BLD-MCNC: 15.3 GM/DL (ref 11.4–16)
LYMPHOCYTES # BLD MANUAL: 0.52 K/UL (ref 1–4.8)
MCH RBC QN AUTO: 30.6 PG (ref 25–35)
MCHC RBC AUTO-ENTMCNC: 31.1 G/DL (ref 31–37)
MCV RBC AUTO: 98.2 FL (ref 80–100)
METAMYELOCYTES # BLD: 0.39 K/UL
MONOCYTES # BLD MANUAL: 0.13 K/UL (ref 0–1)
NEUTROPHILS NFR BLD MANUAL: 32 %
NEUTS SEG # BLD MANUAL: 12 K/UL (ref 1.3–7.7)
PCO2 BLDA: 42 MMHG (ref 35–45)
PH BLDA: 7.24 [PH] (ref 7.35–7.45)
PLATELET # BLD AUTO: 266 K/UL (ref 150–450)
PO2 BLDA: 98 MMHG (ref 83–108)
POTASSIUM SERPL-SCNC: 4 MMOL/L (ref 3.5–5.1)
SODIUM SERPL-SCNC: 140 MMOL/L (ref 137–145)
WBC # BLD AUTO: 13 K/UL (ref 3.8–10.6)

## 2020-06-16 RX ADMIN — HEPARIN SODIUM SCH UNIT: 5000 INJECTION, SOLUTION INTRAVENOUS; SUBCUTANEOUS at 19:24

## 2020-06-16 RX ADMIN — SODIUM BICARBONATE SCH MLS/HR: 84 INJECTION, SOLUTION INTRAVENOUS at 09:41

## 2020-06-16 RX ADMIN — CEFAZOLIN SCH MLS/HR: 330 INJECTION, POWDER, FOR SOLUTION INTRAMUSCULAR; INTRAVENOUS at 03:05

## 2020-06-16 RX ADMIN — IPRATROPIUM BROMIDE AND ALBUTEROL SULFATE SCH ML: .5; 3 SOLUTION RESPIRATORY (INHALATION) at 04:30

## 2020-06-16 RX ADMIN — NOREPINEPHRINE BITARTRATE SCH MLS/HR: 1 INJECTION, SOLUTION, CONCENTRATE INTRAVENOUS at 03:04

## 2020-06-16 RX ADMIN — SODIUM CHLORIDE SCH MLS/HR: 900 INJECTION, SOLUTION INTRAVENOUS at 02:39

## 2020-06-16 RX ADMIN — PROPOFOL SCH MLS/HR: 10 INJECTION, EMULSION INTRAVENOUS at 08:07

## 2020-06-16 RX ADMIN — METRONIDAZOLE SCH MLS/HR: 500 INJECTION, SOLUTION INTRAVENOUS at 05:08

## 2020-06-16 RX ADMIN — IPRATROPIUM BROMIDE AND ALBUTEROL SULFATE SCH ML: .5; 3 SOLUTION RESPIRATORY (INHALATION) at 00:03

## 2020-06-16 RX ADMIN — METRONIDAZOLE SCH MLS/HR: 500 INJECTION, SOLUTION INTRAVENOUS at 11:20

## 2020-06-16 RX ADMIN — SODIUM BICARBONATE SCH MLS/HR: 84 INJECTION, SOLUTION INTRAVENOUS at 17:41

## 2020-06-16 RX ADMIN — CHLORHEXIDINE GLUCONATE SCH ML: 1.2 RINSE ORAL at 08:07

## 2020-06-16 RX ADMIN — FUROSEMIDE SCH MLS/HR: 10 INJECTION, SOLUTION INTRAMUSCULAR; INTRAVENOUS at 19:24

## 2020-06-16 RX ADMIN — IPRATROPIUM BROMIDE AND ALBUTEROL SULFATE SCH ML: .5; 3 SOLUTION RESPIRATORY (INHALATION) at 23:15

## 2020-06-16 RX ADMIN — IPRATROPIUM BROMIDE AND ALBUTEROL SULFATE SCH ML: .5; 3 SOLUTION RESPIRATORY (INHALATION) at 07:18

## 2020-06-16 RX ADMIN — METRONIDAZOLE SCH MLS/HR: 500 INJECTION, SOLUTION INTRAVENOUS at 17:41

## 2020-06-16 RX ADMIN — HEPARIN SODIUM SCH UNIT: 5000 INJECTION, SOLUTION INTRAVENOUS; SUBCUTANEOUS at 08:07

## 2020-06-16 RX ADMIN — PANTOPRAZOLE SODIUM SCH MG: 40 INJECTION, POWDER, FOR SOLUTION INTRAVENOUS at 08:07

## 2020-06-16 RX ADMIN — PROPOFOL SCH MLS/HR: 10 INJECTION, EMULSION INTRAVENOUS at 02:39

## 2020-06-16 RX ADMIN — IPRATROPIUM BROMIDE AND ALBUTEROL SULFATE SCH ML: .5; 3 SOLUTION RESPIRATORY (INHALATION) at 11:02

## 2020-06-16 RX ADMIN — HYDROMORPHONE HYDROCHLORIDE PRN MG: 1 INJECTION, SOLUTION INTRAMUSCULAR; INTRAVENOUS; SUBCUTANEOUS at 13:58

## 2020-06-16 RX ADMIN — CHLORHEXIDINE GLUCONATE SCH ML: 1.2 RINSE ORAL at 19:24

## 2020-06-16 RX ADMIN — PIPERACILLIN AND TAZOBACTAM SCH MLS/HR: 3; .375 INJECTION, POWDER, FOR SOLUTION INTRAVENOUS at 11:20

## 2020-06-16 RX ADMIN — IPRATROPIUM BROMIDE AND ALBUTEROL SULFATE SCH ML: .5; 3 SOLUTION RESPIRATORY (INHALATION) at 15:14

## 2020-06-16 RX ADMIN — PROPOFOL SCH MLS/HR: 10 INJECTION, EMULSION INTRAVENOUS at 15:38

## 2020-06-16 RX ADMIN — FUROSEMIDE SCH MLS/HR: 10 INJECTION, SOLUTION INTRAMUSCULAR; INTRAVENOUS at 08:07

## 2020-06-16 RX ADMIN — PROPOFOL SCH MLS/HR: 10 INJECTION, EMULSION INTRAVENOUS at 23:25

## 2020-06-16 RX ADMIN — IPRATROPIUM BROMIDE AND ALBUTEROL SULFATE SCH ML: .5; 3 SOLUTION RESPIRATORY (INHALATION) at 19:23

## 2020-06-16 RX ADMIN — METRONIDAZOLE SCH MLS/HR: 500 INJECTION, SOLUTION INTRAVENOUS at 23:25

## 2020-06-16 RX ADMIN — PROPOFOL SCH MLS/HR: 10 INJECTION, EMULSION INTRAVENOUS at 20:24

## 2020-06-16 RX ADMIN — PIPERACILLIN AND TAZOBACTAM SCH MLS/HR: 3; .375 INJECTION, POWDER, FOR SOLUTION INTRAVENOUS at 03:07

## 2020-06-16 RX ADMIN — ACETAMINOPHEN PRN MLS/HR: 10 INJECTION, SOLUTION INTRAVENOUS at 23:49

## 2020-06-16 RX ADMIN — PIPERACILLIN AND TAZOBACTAM SCH MLS/HR: 3; .375 INJECTION, POWDER, FOR SOLUTION INTRAVENOUS at 19:24

## 2020-06-16 NOTE — XR
EXAMINATION TYPE: XR chest 1V portable

 

DATE OF EXAM: 6/16/2020

 

COMPARISON: 6/15/2020

 

INDICATION: ICU management, difficulty breathing

 

TECHNIQUE: Single frontal view of the chest is obtained.

 

FINDINGS:  

The heart size is normal.  

The pulmonary vasculature is normal.  

Left lower lobe infiltrate is present. This may be worsening over the interval.

 

Right central venous catheter is present with the tip in the distal superior vena cava region, unchan
ged in position. Endotracheal tube tip is above the kishor.  

 

 

IMPRESSION:  

1. Left lower lobe infiltrate may be worsening.

2. Lines and catheters discussed above.

## 2020-06-16 NOTE — ECHOF
Referral Reason:hypotension



MEASUREMENTS

--------

HEIGHT: 170.2 cm

WEIGHT: 66.7 kg

BP: 106/75

RVIDd:   2.7 cm     (< 3.3)

IVSd:   1.1 cm     (0.6 - 1.1)

LVIDd:   3.5 cm     (3.9 - 5.3)

LVPWd:   1.1 cm     (0.6 - 1.1)

IVSs:   1.5 cm

LVIDs:   2.4 cm

LVPWs:   1.5 cm

LA Diam:   3.4 cm     (2.7 - 3.8)

Ao Diam:   2.8 cm     (2.0 - 3.7)

AV Cusp:   2.0 cm     (1.5 - 2.6)

MV EXCURSION:   14.924 mm     (> 18.000)

MV EF SLOPE:   129 mm/s     (70 - 150)

EPSS:   1.6 cm

RAP:   5.00 mmHg

RVSP:   27.07 mmHg







FINDINGS

--------

This was a technically difficult study with suboptimal views.

The left ventricular size is normal.   There is borderline concentric left ventricular hypertrophy.  
 Overall left ventricular systolic function is normal with, an EF between 60 - 65 %.

The right ventricle is normal in size.

The left atrial size is normal.

The right atrium was not well visualized.

5.0mg of Lumason was utilized for enhancement of images

Interatrial and interventricular septum intact.

The aortic valve was not well visualized.

Mild mitral regurgitation is present.

Mild tricuspid regurgitation present.   Right ventricular systolic pressure is normal at < 35 mmHg.

The pulmonic valve was not well visualized.

The aortic root size is normal.

IVC Not well visulized.

There is no pericardial effusion.



CONCLUSIONS

--------

1. This was a technically difficult study with suboptimal views.

2. The left ventricular size is normal.

3. There is borderline concentric left ventricular hypertrophy.

4. Overall left ventricular systolic function is normal with, an EF between 60 - 65 %.

5. The right ventricle is normal in size.

6. The left atrial size is normal.

7. The right atrium was not well visualized.

8. 5.0mg of Lumason was utilized for enhancement of images

9. Interatrial and interventricular septum intact.

10. The aortic valve was not well visualized.

11. Mild mitral regurgitation is present.

12. Mild tricuspid regurgitation present.

13. Right ventricular systolic pressure is normal at < 35 mmHg.

14. The pulmonic valve was not well visualized.

15. The aortic root size is normal.

16. IVC Not well visulized.

17. There is no pericardial effusion.





SONOGRAPHER: Mary Beth Gates RDCS

## 2020-06-16 NOTE — CDI
Documentation Clarification Form



Date: 06/16/2020 03:51:15 PM

From: Kathryn Montgomery RN, CCDS

Phone: [phone]

MRN: [id number]

Admit Date: 06/14/2020 11:56:00 AM

Patient Name: Anna Carter

Visit Number: [id number]



ATTENTION: The Clinical Documentation Specialists (CDI) and [facility] Coding Staff 
appreciate your assistance in clarifying documentation. Please respond to the 
clarification below the line at the bottom and electronically sign. The CDI & 
[facility] Coding staff will review the response and follow-up if needed. Please note: 
Queries are made part of the Legal Health Record. If you have any questions, 
please contact the author of this message via ITS.



Dr. Socorro Reed



Post-operative Hypotension is documented in the cardiology notes and surgical 
progress notes and requires further clarification.  



History/Risk Factors:

Ruptured sigmoid colon, fecal peritonitis, descending and sigmoid colon 
impaction, appendicolith with appendicitis 



Clinical Indicators: 

6/15 Surgery: "Patient remains hypotensive at the time of examination."

6/15 Cardiology Consult: "postoperative hypotension"

6/16 Cardiology progress note: "Hypotension post surgery."

6/15 [id number] Patients ABP: 91/62, 79/58, 89/51, 87/60                



Treatment:

6/15 Levophed Gtt titrate for B/P

6/15 3L 0.9% NS IIVF Bolus



In your professional opinion, can you please specify the etiology of the 
hypotension if known?



Hypovolemic shock

Septic Shock

Drug Induced Hypotension (please identify drug)

Postoperative Hypotension (please specify if this is expected or unexpected in 
the post-operative period)

Other Condition, please specify ______

Unable to determine



(Last Revision: October 2017)

___________________________________________________________________________



Septic Shock due to peritonitis, the patient comorbidity not as a result of 
surgery



KM 14:54 

6/22/2020 

RONNELL

## 2020-06-16 NOTE — CONS
CONSULTATION



REASON FOR CONSULT:

Acute kidney injury and low urine output.



HISTORY OF PRESENT ILLNESS:

The patient is a 59-year-old female who was admitted to the hospital on 2020 with

complaints of abdominal pain.  She was found to have free air in the abdomen with

perforated viscus and was taken to OR on 2020 where patient had explorative

laparotomy with sigmoid colectomy for perforated sigmoid colon and colostomy.  She also

had open appendectomy and abdominal washout.  Patient has been on the vent.  She had

low urine output yesterday, which was her first postop day.  Blood pressure had been

low, patient is on Levophed.  This morning it is actually being decreased.  The patient

was started on Lasix drip yesterday and her urine output picked up to about 3-400

mL/hour.  Currently, she is on FiO2 at 50%.  The patient remains on Levophed.  She is

also on IV antibiotics.



Serum creatinine is 1.5 mg/dL today and yesterday it was 0.85 and on admission it was

0.53.



PAST MEDICAL HISTORY:

Hypertension, hypothyroidism, vitamin D deficiency.



PAST SURGICAL HISTORY:

Previous , cholecystectomy, gastric bypass surgery, tonsillectomy.



SOCIAL HISTORY:

Patient is a current daily smoker.  No history of drug abuse or alcohol abuse.



MEDICATIONS:

At home prior to admission included Prozac, baclofen, Synthroid, Inderal,

chlorthalidone, Fioricet, vitamin D, multivitamins.



ALLERGIES:

Include SULFA and LATEX.



REVIEW OF SYSTEMS:

As per HPI.  Other systems negative.



PHYSICAL EXAMINATION:

Patient is currently sedated, she is on the vent.  Blood pressure this morning was

107/73, heart rate 125 per minute, she is afebrile.

Examination of the heart S1, S2.  Rapid heart rate noted.  Examination of the lungs,

bilateral breath sounds are heard.  Abdomen is soft.  Examination of the lower

extremities shows no significant edema.  CNS exam cannot be assessed.



LABS:

Show sodium of 140, potassium 4.0, chloride 116. CO2 is 17, BUN 35, serum creatinine

1.5, hemoglobin of 15.3 g/dL.  UA shows WBCs 13, 1+ protein, trace moderate blood.



ASSESSMENT:

1. Acute kidney injury secondary to hypotension initially oliguric ATN currently

    nonoliguric with improved urine output on Lasix drip.  Chest x-ray does not show

    increased pulmonary vasculature and patient's urine output is currently at about

    200-300 mL an hour.  I will decrease the Lasix drip to 5 mg an hour and eventually

    discontinue it later on today and switch to IV push Lasix.  Repeat chest x-ray in

    a.m. Continue to avoid nephrotoxic agents.

2. Acute abdomen, status post explorative laparotomy for perforated bowel with sigmoid

    colectomy and colostomy.

3. Hypoxic respiratory failure currently on the vent.  Patient's sedation is being

    weaned down.

4. Hypotension secondary to sepsis from intraabdominal source, improving.  Levophed is

    being weaned down.

5. Metabolic acidosis associated with renal failure, hypotension, and GI fluid loss.

    I will add IV bicarb if the acidosis worsens.  Expect improvement in the metabolic

    acidosis with improving renal function.



PLAN:

Continue IV fluids and decrease Lasix drip to 5 mg an hour and discontinue the Lasix

drip later on today.  Repeat chest x-ray in a.m.  Avoid nephrotoxic medications and

wean down Levophed.  Continue antibiotics.



Thank you for this consultation.  Will continue to follow the patient with you during

her hospitalization.





MMODL / IJN: [id number] / Job#: [id number]

## 2020-06-16 NOTE — PN
PROGRESS NOTE



Mrs. Carter is a 59-year-old female who presented with ruptured viscus, underwent

surgical intervention by Dr. Reed.  She remains intubated and sedated at this

time.  Hemodynamically, she is doing better.  She is in sinus tachycardia.  She is on a

lower dose of norepinephrine.  She underwent a sigmoid colectomy with descending

colostomy and Roselia's procedure.  She has no output from the colostomy but there is

drainage from the TERESSA drain.  She has been maintained on IV Lasix drip.



PHYSICAL EXAMINATION:

Blood pressure 102/70 with a heart rate in the 110s to 120s, sinus..

LUNGS:  Clear to auscultation anteriorly.

HEART:  Regular rate and rhythm, S1, S2.  No S3 with systolic ejection murmur, heard at

the base, no diastolic murmur.

ABDOMEN:  Soft.  The dressing in place.  Colostomy noted.

EXTREMITIES:  No edema.  The renal ultrasound revealed no evidence of hydronephrosis.

Her chest x-ray this morning revealed a small effusion on the left side.



LAB DATA:

Revealed a white blood cell of 13,000, hemoglobin 15.3, BUN and creatinine 35 and 1.5,

potassium 4.0.



IMPRESSION:

1. Ruptured viscus, status post surgical intervention will be Roselia's procedure.

2. Hypotension post surgery.

3. Acute tubular necrosis with acute kidney injury.

4. Sinus tachycardia related to the tachycardia to the hemodynamically status and the

    infectious process.



RECOMMENDATION:

From the cardiac standpoint, will review the results for echocardiogram.  Consider

medical regimen, continue supportive care.  Depending on her progress, further

recommendation will be made/





MMODL / IJN: [id number] / Job#: [id number]

## 2020-06-16 NOTE — PN
PROGRESS NOTE



PULMONARY/CRITICAL CARE PROGRESS NOTE:



DATE OF SERVICE:

06/16/2020



This is a 59-year-old female who was seen yesterday in consultation.  She apparently

came into the emergency room on June 14, 2020 at 11:56.  She is a 59-year-old female

who presented with abdominal pain.  She is status postop day #2, status post

exploratory laparotomy, sigmoid colectomy, descending colostomy with Roselia's

procedure, disimpaction of the descending colon, and sigmoid colon, open appendectomy,

abdominal washout with 6 L of saline, placement of a right lower pelvic #19 Jimenez-

Rincon drain, and application of an incisional wound VAC system.  Currently, the patient

remains on the mechanical ventilator.  She is on the volume assist-control mode rate of

26, tidal volume 350, FiO2 of 50%, PEEP of 10.  Blood gases show pO2 of 98, pCO2 of 42

and a pH of 7.24.  These blood gases are consistent with mild to moderate metabolic

acidosis.  She is getting Levophed at 3.3 mcg/minute, Lasix drip a 10 mg an hour,

fentanyl at 1 mcg/kg per hour, Diprivan at 50 mcg/kg per minute and 0.45 at 100 mL an

hour.  She was on saline, is being converted to 0.45.  Today, we are going to do a

daily interruption of sedation and a potential spontaneous breathing trial



Other than that, she has had an uneventful night.



Current vital signs are reviewed, temperature is 99.8, heart rate 26, blood pressure

107/72 mean 83, saturations 97% on 50% 10 of PEEP.

Appears in no acute distress.  Currently sedated with fentanyl and Diprivan.

HEENT: Examination is grossly unremarkable.  There is an orally placed endotracheal

tube.

NECK:  Supple, full range of motion.  No adenopathy.  Neck veins are flat.

CARDIOVASCULAR: Examination reveals regular rhythm and rate.  S1, S2 normal.  Heart

rate about 115.

LUNGS: Reveal coarse bilateral rhonchi.  Breath sounds equal.  No crackles.  No

wheezes.

ABDOMEN:  Soft.  No bowel sounds.

EXTREMITIES:  Intact.  No cyanosis, clubbing, or edema.

SKIN: Without rash.

NEUROLOGIC: Examination could not be adequately assessed given her current level of

sedation.



LABS:

Reviewed.  White count 13,000, hemoglobin 15.3, hematocrit 49.1, platelet count

266,000.  Blood gases are noted, PO2 of 98, pCO2 of 42, pH of 7.24.  Consistent with

metabolic acidosis.  Sodium 140, potassium 4, chloride 116, CO2 is 17, anion gap 7. BUN

and creatinine were 35 and 1.50.  Putting everything together, she has hyperchloremic

non-anion gap metabolic acidosis.  Calcium is 6.9.



Microbiology is reviewed.  Everything thus far is negative.



Chest x-ray shows primarily a left-sided infiltrate.  The right lung was relatively

clear.  Central line looks fine, endotracheal tube is above the tracheal kishor.  There

is either fluid or infiltrate or atelectasis in the left base.



MEDICATIONS:

Reviewed.  She is on IV Tylenol, chlorhexidine, fentanyl, Lasix drip, subcu heparin,

updrafts, Flagyl, morphine, Narcan, norepinephrine, Protonix, Zosyn, propofol and

saline IV to be converted to 0.45 IV.



ASSESSMENT:

1. Postoperative day #2, status post exploratory laparotomy with sigmoid colectomy for

    perforated sigmoid colon, Roselia's procedure, disimpaction of descending colon,

    open appendectomy, abdominal washout for fecal peritonitis, placement of a Jimenez-

    Rincon drain, and application of an incisional wound VAC system.

2. History of hypothyroidism.

3. History of ongoing tobacco use with nicotine addiction.



PLAN:

Currently, the patient is on the volume assist-control mode rate of 26.  She is on 50%,

10 of PEEP. That is improvement from yesterday.  She does have a mild metabolic

acidosis.  The patient remains on norepinephrine at 3.3 mcg/ minute.  She is also

getting fentanyl and Diprivan.  We will do a daily interruption of sedation.  Will

change her saline IV to 0.45.  The patient does have a non-anion gap hyperchloremic

metabolic acidosis.  Will hopefully do a spontaneous breathing trial depending on how

she wakes up.  Additional recommendations and suggestions are forthcoming.  Prognosis

is guarded.





Critical care time 33 minutes.





MMODL / IJN: [id number] / Job#: [id number]

## 2020-06-16 NOTE — P.PN
<Roula Bailey NATALIA - Last Filed: 06/16/20 10:58>





Subjective


Progress Note Date: 06/16/20





CHIEF COMPLAINT: Abdominal pain





HISTORY OF PRESENT ILLNESS: 59-year-old female who underwent exploratory 

laparotomy with sigmoid colectomy, descending colostomy creation, and 

appendectomy with Dr. Reed on June 14, 2020.  Patient examined at the 

bedside with Dr. Reed.  Patient remains in intensive care unit.  She is on 

mechanical ventilation. Fio2 50%. PEEP 10. She remains on Levophed. She has been

started on a lasix drip with improvement in urine output. WBC 13.0. Hemoglobin 1

5.3. HR 120s. Temperature 99.8





PHYSICAL EXAM: 


VITAL SIGNS: Reviewed


GENERAL: Well-developed in no acute distress-sedated on mechanical ventilation. 


HEENT:  No sclera icterus. Extraocular movements grossly intact.  Moist buccal 

mucosa. 


Head is atraumatic, normocephalic. No nasal drainage.


NECK:  Supple without lymphadenopathy.


CHEST:  Non-labored respirations and equal bilateral excursions-remains on 

ventilator. 


CARDIOVASCULAR:  Tachycardic.  Regular rate with regular rhythm.  Palpable 2+ 

radial pulses.


ABDOMEN:  Soft.  Nondistended. PREVENA wound vac noted. Ostomy to left side of 

abdomen. No stool or gas noted. Stoma pink. TERESSA drain with serosanguineous 

drainage.


MUSCULOSKELETAL:  No clubbing or cyanosis.


NEUROLOGIC:  Sedated on mechanical ventilation


PSYCH:  Unable to assess secondary to mechanical ventilation


SKIN: Well perfused.  Good skin turgor. 





ASSESSMENT: 


1.  Abdominal pain secondary to ruptured sigmoid colon, fecal peritonitis, d

escending and sigmoid colon impaction, appendicolith with appendicitis





PLAN: 


Continue to wean pressors as tolerated


NO NG/OG tube secondary to history of gastric bypass. Will consider TPN if 

patient unable to be extubated within a day or two. 


Continue antibiotics. Monitor labs. 


Further ICU and ventilator management per Dr. Castaneda.





Nurse practitioner note has been reviewed by physician. Signing provider agrees 

with the documented findings, assessment, and plan of care. 








Objective





- Vital Signs


Vital signs: 


                                   Vital Signs











Temp  99.8 F H  06/16/20 08:00


 


Pulse  128 H  06/16/20 10:00


 


Resp  23   06/16/20 10:00


 


BP  102/73   06/16/20 10:00


 


Pulse Ox  99   06/16/20 10:00








                                 Intake & Output











 06/15/20 06/16/20 06/16/20





 18:59 06:59 18:59


 


Intake Total 2926.084 1947.753 538.391


 


Output Total 530 1880 775


 


Balance 2396.084 67.753 -236.609


 


Weight  66.2 kg 


 


Intake:   


 


  IV 2500 1600 100


 


    Acetaminophen 1000mg 100 100 


 


    LR Bolus 1000  


 


    Pipercillin/ Tazobactam 3 100 100 





    .375 g   


 


    Sodium Chloride 0.9% 1, 1200 1300 100





    000 ml @ 100 mls/hr IV .   





    Q10H [facility] Rx#:[id number]   


 


    metroNIDAZOLE-NS  100 100 





    mg   


 


  Intake, IV Titration 426.084 347.753 438.391





  Amount   


 


    Furosemide 100 mg In  77.333 84.167





    Sodium Chloride 0.9% 90   





    ml @ 5 MG/HR 5 mls/hr IV   





    .Q20H [facility] Rx#:[id number]   


 


    Norepinephrine 32 mg In 75.763 64.593 6.449





    Sodium Chloride 0.9% 218   





    ml @ 0.05 MCG/KG/MIN 1.   





    595 mls/hr IV .Q24H [facility]   





    Rx#:[id number]   


 


    Propofol 1,000 mg In 206.829 158.924 147.775





    Empty Bag 1 bag @ Titrate   





    IV .Q0M [facility] Rx#:   





    [id number]   


 


    Sodium Chloride 0.45% 1,   200





    000 ml @ 100 mls/hr IV .   





    Q10H [facility] Rx#:[id number]   


 


    fentaNYL (PF) 1,000 mcg 143.492 46.903 





    In Sodium Chloride 0.9%   





    80 ml @ Per Protocol IV .   





    Q0M [facility] Rx#:[id number]   


 


Output:   


 


  Drainage 270 245 


 


    Right Lower Abdomen 270 245 


 


  Urine 260 1635 775


 


Other:   


 


  Voiding Method Indwelling Catheter Indwelling Catheter Indwelling Catheter








                       ABP, PAP, CO, CI - Last Documented











Arterial Blood Pressure        118/75

















- Labs


CBC & Chem 7: 


                                 06/16/20 04:15





                                 06/16/20 04:15


Labs: 


                  Abnormal Lab Results - Last 24 Hours (Table)











  06/15/20 06/15/20 06/15/20 Range/Units





  10:17 11:37 23:34 


 


WBC     (3.8-10.6)  k/uL


 


Hct     (34.0-46.0)  %


 


RDW     (11.5-15.5)  %


 


Neutrophils # (Manual)     (1.3-7.7)  k/uL


 


Lymphocytes # (Manual)     (1.0-4.8)  k/uL


 


Metamyelocytes # (Man)     (0)  k/uL


 


ABG pH     (7.35-7.45)  


 


ABG HCO3     (21-25)  mmol/L


 


Chloride     ()  mmol/L


 


Carbon Dioxide     (22-30)  mmol/L


 


BUN     (7-17)  mg/dL


 


Creatinine     (0.52-1.04)  mg/dL


 


Glucose     (74-99)  mg/dL


 


POC Glucose (mg/dL)   101 H  110 H  (75-99)  mg/dL


 


Calcium     (8.4-10.2)  mg/dL


 


Troponin I  0.108 H*    (0.000-0.034)  ng/mL














  06/16/20 06/16/20 06/16/20 Range/Units





  04:15 04:15 05:20 


 


WBC  13.0 H    (3.8-10.6)  k/uL


 


Hct  49.1 H    (34.0-46.0)  %


 


RDW  17.2 H    (11.5-15.5)  %


 


Neutrophils # (Manual)  12.00 H    (1.3-7.7)  k/uL


 


Lymphocytes # (Manual)  0.52 L    (1.0-4.8)  k/uL


 


Metamyelocytes # (Man)  0.39 H    (0)  k/uL


 


ABG pH    7.24 L  (7.35-7.45)  


 


ABG HCO3    18 L  (21-25)  mmol/L


 


Chloride   116 H   ()  mmol/L


 


Carbon Dioxide   17 L   (22-30)  mmol/L


 


BUN   35 H   (7-17)  mg/dL


 


Creatinine   1.50 H   (0.52-1.04)  mg/dL


 


Glucose   101 H   (74-99)  mg/dL


 


POC Glucose (mg/dL)     (75-99)  mg/dL


 


Calcium   6.9 L   (8.4-10.2)  mg/dL


 


Troponin I     (0.000-0.034)  ng/mL








                      Microbiology - Last 24 Hours (Table)











 06/14/20 23:00 Gram Stain - Preliminary





 Abdomen Wound Culture - Preliminary


 


 06/14/20 23:00 Gram Stain - Preliminary





 Abdomen Wound Culture - Preliminary


 


 06/14/20 23:54 Gram Stain - Preliminary





 Sputum Sputum Culture - Preliminary


 


 06/14/20 23:00 Anaerobic Culture - Preliminary





 Abdomen 


 


 06/14/20 23:00 Anaerobic Culture - Preliminary





 Abdomen 


 


 06/15/20 01:49 Urine Culture - Preliminary





 Urine,Voided 














<Socorro Reed - Last Filed: 06/26/20 02:58>





Subjective





Patient seen and evaluated with nurse practitioner.  Additional findings include

persistent ventilatory status exacerbated by pre-existing history of chronic 

tobacco abuse.  Nephrology including cardiology consultation requested for ol

iguria including likely pre-existing cardiac disease.  Ostomy viable however 

without stool or flatus as to be expected.  Patient with leukocytosis consistent

with peritonitis and sepsis from perforated colon.  Will need antibiotic 

management from infectious disease team.  Cultures of abscess obtained 

intraoperatively and pending.





Objective





- Vital Signs


Vital signs: 


                                   Vital Signs











Temp  98.1 F   06/26/20 00:00


 


Pulse  79   06/26/20 02:00


 


Resp  22   06/26/20 02:00


 


BP  95/74   06/25/20 20:00


 


Pulse Ox  96   06/26/20 02:00








                                 Intake & Output











 06/25/20 06/25/20 06/26/20





 06:59 18:59 06:59


 


Intake Total 1300 2538.2 876


 


Output Total 1815 2300 1300


 


Balance -515 238.2 -424


 


Weight 79 kg  


 


Intake:   


 


  IV 1300 1226 876


 


    Ampicillin-Sulbactam 3 gm 200 100 100





    In Sodium Chloride 0.9%   





    100 ml @ 200 mls/hr IVPB   





    Q6HR [facility] Rx#:[id number]   


 


    Fat Emulsion 20% 250 mL@  126 126





    20.833mls/hr   


 


    Mvi, Adult No.4 with Vit 660 660 





    K 10 ml Trace (Conc-1Ml/   





    Dose) 1 ml Potassium   





    Chloride 60 meq Potassium   





    Phosphate 30 mmol   





    Magnesium Sulfate gm 0.6   





    gm Calcium Gluconate 1 gm   





    In Amino Acid 5%-D15w 1,   





    000 ml @ 55 mls/hr IV .   





    [id number] [facility] Rx#:[id number]   


 


    Mvi, Adult No.4 with Vit   385





    K 10 ml Trace (Conc-1Ml/   





    Dose) 1 ml Potassium   





    Chloride 60 meq Potassium   





    Phosphate 30 mmol   





    Magnesium Sulfate gm 0.6   





    gm Calcium Gluconate 1 gm   





    In Amino Acid 5%-D15w 1,   





    000 ml @ 55 mls/hr IV .   





    [id number] [facility] Rx#:[id number]   


 


    Sodium Chloride 0.45% 1, 240 240 160





    000 ml @ 20 mls/hr IV .   





    Q24H [facility] Rx#:[id number]   


 


    Sodium Chloride 0.9% 1,00   5





    mL   


 


    metroNIDAZOLE-NS  200 100 100





    mg   


 


  Intake, IV Titration  1062.2 





  Amount   


 


    Mvi, Adult No.4 with Vit  1062.2 





    K 10 ml Trace (Conc-1Ml/   





    Dose) 1 ml Potassium   





    Chloride 60 meq Potassium   





    Phosphate 30 mmol   





    Magnesium Sulfate gm 0.6   





    gm Calcium Gluconate 1 gm   





    In Amino Acid 5%-D15w 1,   





    000 ml @ 55 mls/hr IV .   





    [id number] [facility] Rx#:[id number]   


 


  Oral  250 


 


Output:   


 


  Drainage 40  


 


    Right Lower Abdomen 40  


 


  Urine 1575 1450 1200


 


  Stool 200 850 100


 


Other:   


 


  Voiding Method Indwelling Catheter Indwelling Catheter Indwelling Catheter








                       ABP, PAP, CO, CI - Last Documented











Arterial Blood Pressure        89/47

















- Labs


CBC & Chem 7: 


                                 06/25/20 05:25





                                 06/25/20 05:25


Labs: 


                  Abnormal Lab Results - Last 24 Hours (Table)











  06/25/20 06/25/20 06/25/20 Range/Units





  05:25 05:25 17:33 


 


WBC   13.7 H   (3.8-10.6)  k/uL


 


RBC   3.79 L   (3.80-5.40)  m/uL


 


RDW   17.0 H   (11.5-15.5)  %


 


Neutrophils #   11.6 H   (1.3-7.7)  k/uL


 


Sodium  133 L    (137-145)  mmol/L


 


Creatinine  0.34 L    (0.52-1.04)  mg/dL


 


Glucose  114 H    (74-99)  mg/dL


 


POC Glucose (mg/dL)    101 H  (75-99)  mg/dL


 


Calcium  8.1 L    (8.4-10.2)  mg/dL








                      Microbiology - Last 24 Hours (Table)











 06/24/20 13:50 Blood Culture - Preliminary





 Blood    No Growth after 24 hours


 


 06/19/20 05:10 Blood Culture - Final





 Blood    No Growth after 144 hours














Assessment and Plan


(1) Peritonitis (acute) generalized


Current Visit: Yes   Status: Acute   Code(s): K65.0 - GENERALIZED (ACUTE) 

PERITONITIS   SNOMED Code(s): 74172209


   





(2) History of gastric bypass


Current Visit: Yes   Status: Acute   Code(s): Z98.84 - BARIATRIC SURGERY STATUS 

 SNOMED Code(s): 928217690


   





(3) Medical non-compliance


Current Visit: Yes   Status: Acute   Code(s): Z91.19 - PATIENT'S NONCOMPLIANCE W

OTH MEDICAL TREATMENT AND REGIMEN   SNOMED Code(s): 173195675


   





(4) Tobacco abuse


Current Visit: Yes   Status: Acute   Code(s): Z72.0 - TOBACCO USE   SNOMED 

Code(s): 516451876


   





(5) Tobacco abuse counseling


Current Visit: Yes   Status: Acute   Code(s): Z71.6 - TOBACCO ABUSE COUNSELING  

SNOMED Code(s): 050989222


   





(6) Depressive disorder


Current Visit: Yes   Status: Acute   Code(s): F32.9 - MAJOR DEPRESSIVE DISORDER,

SINGLE EPISODE, UNSPECIFIED   SNOMED Code(s): 80988743


   





(7) Hypothyroidism


Current Visit: Yes   Status: Acute   Code(s): E03.9 - HYPOTHYROIDISM, 

UNSPECIFIED   SNOMED Code(s): 25486553


   





(8) Abdominal pain


Current Visit: Yes   Status: Acute   Code(s): R10.9 - UNSPECIFIED ABDOMINAL PAIN

  SNOMED Code(s): 24392924


   





(9) Free intraperitoneal air


Current Visit: Yes   Status: Acute   Code(s): K66.8 - OTHER SPECIFIED DISORDERS 

OF PERITONEUM   SNOMED Code(s): 48127286


   





(10) Dehydration


Current Visit: Yes   Status: Acute   Code(s): E86.0 - DEHYDRATION   SNOMED 

Code(s): 56329914

## 2020-06-17 LAB
ALBUMIN SERPL-MCNC: 2.1 G/DL (ref 3.5–5)
ALP SERPL-CCNC: 80 U/L (ref 38–126)
ALT SERPL-CCNC: 17 U/L (ref 4–34)
ANION GAP SERPL CALC-SCNC: 5 MMOL/L
ANION GAP SERPL CALC-SCNC: 8 MMOL/L
AST SERPL-CCNC: 48 U/L (ref 14–36)
BUN SERPL-SCNC: 34 MG/DL (ref 7–17)
BUN SERPL-SCNC: 38 MG/DL (ref 7–17)
CALCIUM SPEC-MCNC: 6.9 MG/DL (ref 8.4–10.2)
CALCIUM SPEC-MCNC: 7 MG/DL (ref 8.4–10.2)
CELLS COUNTED: 100
CHLORIDE SERPL-SCNC: 111 MMOL/L (ref 98–107)
CHLORIDE SERPL-SCNC: 112 MMOL/L (ref 98–107)
CO2 BLDA-SCNC: 24 MMOL/L (ref 19–24)
CO2 SERPL-SCNC: 21 MMOL/L (ref 22–30)
CO2 SERPL-SCNC: 23 MMOL/L (ref 22–30)
ERYTHROCYTE [DISTWIDTH] IN BLOOD BY AUTOMATED COUNT: 4.1 M/UL (ref 3.8–5.4)
ERYTHROCYTE [DISTWIDTH] IN BLOOD: 17.5 % (ref 11.5–15.5)
GLUCOSE BLD-MCNC: 102 MG/DL (ref 75–99)
GLUCOSE BLD-MCNC: 109 MG/DL (ref 75–99)
GLUCOSE BLD-MCNC: 130 MG/DL (ref 75–99)
GLUCOSE BLD-MCNC: 133 MG/DL (ref 75–99)
GLUCOSE BLD-MCNC: 141 MG/DL (ref 75–99)
GLUCOSE SERPL-MCNC: 107 MG/DL (ref 74–99)
GLUCOSE SERPL-MCNC: 134 MG/DL (ref 74–99)
HCO3 BLDA-SCNC: 22 MMOL/L (ref 21–25)
HCT VFR BLD AUTO: 39.8 % (ref 34–46)
HGB BLD-MCNC: 14 GM/DL (ref 11.4–16)
LYMPHOCYTES # BLD MANUAL: 0.98 K/UL (ref 1–4.8)
MCH RBC QN AUTO: 34 PG (ref 25–35)
MCHC RBC AUTO-ENTMCNC: 35.1 G/DL (ref 31–37)
MCV RBC AUTO: 97 FL (ref 80–100)
METAMYELOCYTES # BLD: 0.12 K/UL
MONOCYTES # BLD MANUAL: 0.62 K/UL (ref 0–1)
NEUTROPHILS NFR BLD MANUAL: 77 %
NEUTS SEG # BLD MANUAL: 10.5 K/UL (ref 1.3–7.7)
PCO2 BLDA: 43 MMHG (ref 35–45)
PH BLDA: 7.32 [PH] (ref 7.35–7.45)
PLATELET # BLD AUTO: 201 K/UL (ref 150–450)
PO2 BLDA: 137 MMHG (ref 83–108)
POTASSIUM SERPL-SCNC: 2.8 MMOL/L (ref 3.5–5.1)
POTASSIUM SERPL-SCNC: 3.1 MMOL/L (ref 3.5–5.1)
POTASSIUM SERPL-SCNC: 3.4 MMOL/L (ref 3.5–5.1)
PROT SERPL-MCNC: 4.3 G/DL (ref 6.3–8.2)
SODIUM SERPL-SCNC: 140 MMOL/L (ref 137–145)
SODIUM SERPL-SCNC: 140 MMOL/L (ref 137–145)
TRIGL SERPL-MCNC: 740 MG/DL (ref ?–150)
WBC # BLD AUTO: 12.3 K/UL (ref 3.8–10.6)

## 2020-06-17 RX ADMIN — SODIUM BICARBONATE SCH MLS/HR: 84 INJECTION, SOLUTION INTRAVENOUS at 15:36

## 2020-06-17 RX ADMIN — PIPERACILLIN AND TAZOBACTAM SCH MLS/HR: 3; .375 INJECTION, POWDER, FOR SOLUTION INTRAVENOUS at 11:33

## 2020-06-17 RX ADMIN — IPRATROPIUM BROMIDE AND ALBUTEROL SULFATE SCH ML: .5; 3 SOLUTION RESPIRATORY (INHALATION) at 11:35

## 2020-06-17 RX ADMIN — ACETAMINOPHEN PRN MLS/HR: 10 INJECTION, SOLUTION INTRAVENOUS at 08:51

## 2020-06-17 RX ADMIN — POTASSIUM CHLORIDE SCH MLS/HR: 14.9 INJECTION, SOLUTION INTRAVENOUS at 12:18

## 2020-06-17 RX ADMIN — METRONIDAZOLE SCH MLS/HR: 500 INJECTION, SOLUTION INTRAVENOUS at 05:10

## 2020-06-17 RX ADMIN — PROPOFOL SCH MLS/HR: 10 INJECTION, EMULSION INTRAVENOUS at 04:46

## 2020-06-17 RX ADMIN — PIPERACILLIN AND TAZOBACTAM SCH MLS/HR: 3; .375 INJECTION, POWDER, FOR SOLUTION INTRAVENOUS at 20:34

## 2020-06-17 RX ADMIN — IPRATROPIUM BROMIDE AND ALBUTEROL SULFATE SCH ML: .5; 3 SOLUTION RESPIRATORY (INHALATION) at 15:31

## 2020-06-17 RX ADMIN — CHLORHEXIDINE GLUCONATE SCH: 1.2 RINSE ORAL at 08:49

## 2020-06-17 RX ADMIN — POTASSIUM CHLORIDE SCH MLS/HR: 14.9 INJECTION, SOLUTION INTRAVENOUS at 17:35

## 2020-06-17 RX ADMIN — METOPROLOL TARTRATE SCH MG: 1 INJECTION, SOLUTION INTRAVENOUS at 08:44

## 2020-06-17 RX ADMIN — METRONIDAZOLE SCH MLS/HR: 500 INJECTION, SOLUTION INTRAVENOUS at 17:38

## 2020-06-17 RX ADMIN — POTASSIUM CHLORIDE SCH MLS/HR: 14.9 INJECTION, SOLUTION INTRAVENOUS at 07:25

## 2020-06-17 RX ADMIN — METOPROLOL TARTRATE SCH MG: 1 INJECTION, SOLUTION INTRAVENOUS at 20:34

## 2020-06-17 RX ADMIN — IPRATROPIUM BROMIDE AND ALBUTEROL SULFATE SCH ML: .5; 3 SOLUTION RESPIRATORY (INHALATION) at 03:10

## 2020-06-17 RX ADMIN — SOYBEAN OIL SCH MLS/HR: 20 INJECTION, SOLUTION INTRAVENOUS at 11:56

## 2020-06-17 RX ADMIN — POTASSIUM CHLORIDE SCH MLS/HR: 14.9 INJECTION, SOLUTION INTRAVENOUS at 13:53

## 2020-06-17 RX ADMIN — IPRATROPIUM BROMIDE AND ALBUTEROL SULFATE SCH ML: .5; 3 SOLUTION RESPIRATORY (INHALATION) at 20:21

## 2020-06-17 RX ADMIN — HEPARIN SODIUM SCH UNIT: 5000 INJECTION, SOLUTION INTRAVENOUS; SUBCUTANEOUS at 09:08

## 2020-06-17 RX ADMIN — PIPERACILLIN AND TAZOBACTAM SCH MLS/HR: 3; .375 INJECTION, POWDER, FOR SOLUTION INTRAVENOUS at 04:46

## 2020-06-17 RX ADMIN — IPRATROPIUM BROMIDE AND ALBUTEROL SULFATE SCH: .5; 3 SOLUTION RESPIRATORY (INHALATION) at 23:17

## 2020-06-17 RX ADMIN — FUROSEMIDE SCH: 10 INJECTION, SOLUTION INTRAMUSCULAR; INTRAVENOUS at 10:31

## 2020-06-17 RX ADMIN — HEPARIN SODIUM SCH UNIT: 5000 INJECTION, SOLUTION INTRAVENOUS; SUBCUTANEOUS at 20:34

## 2020-06-17 RX ADMIN — PANTOPRAZOLE SODIUM SCH MG: 40 INJECTION, POWDER, FOR SOLUTION INTRAVENOUS at 09:06

## 2020-06-17 RX ADMIN — POTASSIUM CHLORIDE SCH MLS/HR: 14.9 INJECTION, SOLUTION INTRAVENOUS at 19:00

## 2020-06-17 RX ADMIN — POTASSIUM CHLORIDE SCH MLS/HR: 14.9 INJECTION, SOLUTION INTRAVENOUS at 20:34

## 2020-06-17 RX ADMIN — HYDROMORPHONE HYDROCHLORIDE PRN MG: 1 INJECTION, SOLUTION INTRAMUSCULAR; INTRAVENOUS; SUBCUTANEOUS at 20:34

## 2020-06-17 RX ADMIN — METRONIDAZOLE SCH MLS/HR: 500 INJECTION, SOLUTION INTRAVENOUS at 11:33

## 2020-06-17 RX ADMIN — IPRATROPIUM BROMIDE AND ALBUTEROL SULFATE SCH ML: .5; 3 SOLUTION RESPIRATORY (INHALATION) at 07:54

## 2020-06-17 RX ADMIN — NOREPINEPHRINE BITARTRATE SCH: 1 INJECTION, SOLUTION, CONCENTRATE INTRAVENOUS at 04:36

## 2020-06-17 RX ADMIN — POTASSIUM CHLORIDE SCH MLS/HR: 14.9 INJECTION, SOLUTION INTRAVENOUS at 09:06

## 2020-06-17 RX ADMIN — POTASSIUM CHLORIDE SCH MLS/HR: 14.9 INJECTION, SOLUTION INTRAVENOUS at 05:41

## 2020-06-17 RX ADMIN — SODIUM BICARBONATE SCH MLS/HR: 84 INJECTION, SOLUTION INTRAVENOUS at 04:46

## 2020-06-17 RX ADMIN — ACETAMINOPHEN PRN MLS/HR: 10 INJECTION, SOLUTION INTRAVENOUS at 17:18

## 2020-06-17 NOTE — PN
PROGRESS NOTE



PULMONARY/CRITICAL CARE PROGRESS NOTE:



DATE OF SERVICE:

06/17/2020



Critical care time 33 minutes.



This is a 59-year-old female seen 2 days ago in consultation.  She came to the

emergency room on June 14, 2020 for abdominal pain.  She is postop day #3, status post

exploratory laparotomy, sigmoid colectomy, descending colostomy with Roselia's

procedure, disimpaction of the descending colon, and sigmoid colon, open appendectomy,

abdominal washout with 6 L of saline, placement of a right lower abdominal #19 Jimenez

Rincon drain, and application of incisional wound VAC system.  Currently, the patient

remains on the mechanical ventilator.  Her vent settings are the volume assist-control

mode rate of 26, tidal volume 350, FiO2 of 50%, PEEP of 10.  I have asked the nurses to

drop the PEEP down from 10 to 5.  Gases this morning show pO2 of 137, pCO2 of 43, pH of

7.32.  She is getting 0.45 at 100, Diprivan is currently off for daily interruption of

sedation.  She is on a Lasix drip of 5 mg an hour.  Today I am going to trial her on a

spontaneous breathing trial at PSV 5, CPAP of 5.  She should do well.



Her chest x-ray looks stable.  May be a small left-sided pleural effusion or

atelectasis.



Current vital signs are stable. Temperature is 99.9, heart rate of 100, respiratory

rate is 26, blood pressure 125/86 mean 99, saturations are 99%.  Appears in no acute

distress.  Looks comfortable.

HEENT: Examination is grossly unremarkable.  She has got an orally placed endotracheal

tube and NG tube.

NECK:  Supple.  Full range of motion.  No adenopathy.  Neck veins are flat.

CARDIOVASCULAR: Examination reveals regular rhythm and rate.  S1, S2 normal.  No S3,

S4, or murmur.

LUNGS:  Reveal mostly clear breath sounds.  No wheezes, rhonchi, or crackles.

ABDOMEN:  Soft, bowel sounds are heard.

EXTREMITIES:  Intact.  No cyanosis, clubbing, or edema.

SKIN: Without rash.

NEUROLOGIC: Examination could not be adequately assessed.



LABS:

Reviewed.  White count 12.3, hemoglobin 14, hematocrit 39.8, platelet count 201,000.

Sodium 140, potassium 2.8, chloride 111, CO2 is 21, anion gap is 8. BUN and creatinine

were 30 and 1.42.  Calcium 7.



Microbiology is currently all negative.



Chest x-ray has been done.  Again, the chest x-ray looks relatively stable except for

some minimal atelectasis or effusion at the left base.



CURRENT MEDICATIONS:

Reviewed.  She is currently on Tylenol, chlorhexidine, Lasix drip, subcu heparin,

Dilaudid IV, DuoNeb, Flagyl, Narcan, Zosyn, Protonix, potassium replacement, propofol

and her 0.45 IV.



ASSESSMENT:

1. Postoperative day #3, status post exploratory laparotomy with sigmoid colectomy for

    perforated sigmoid colon, Roselia's procedure, disimpaction of descending colon,

    open appendectomy, abdominal washout for fecal peritonitis, placement of a Jimenez-

    Rincon drain, and application of an incisional wound VAC system.

2. History of hypothyroidism.

3. History of ongoing tobacco use and nicotine addiction.

4. Failure to wean from mechanical ventilation.



PLAN:

The patient will get a daily interruption of sedation and spontaneous breathing trial

today.  Additional recommendations and suggestions are forthcoming.  Prognosis is

guarded.  Diprivan is off.  She is on a Lasix drip.  Will start off at PSV 5, CPAP of

5.  Additional recommendations and suggestions are forthcoming.





MMODL / IJN: [id number] / Job#: [id number]

## 2020-06-17 NOTE — P.CNNES
History of Present Illness


Consult date: 20


Requesting physician: Socorro Reed


Reason for Consult: Mental status change


History of Present Illness: 





Patient is a 59-year-old female, who came to the ER on 2020 with 

generalized abdominal pain.  Patient was diagnosed with perforated bowel, with 

fecal peritonitis.  He underwent laparotomy on the same day.  Patient was placed

on mechanical ventilation.  Patient was on sedation since then, and was not foll

owing 2 months.  This morning patient was extubated.  Patient was still noted 

not to follow commands.  This prompted neurology consultation.  According to the

nursing report, patient does move extremities well.  Only one time when she 

called by her name, patient said "Haan".  One time she said "what".  Patient 

took a nap short while ago and now she just woke up and is much improved as per 

examination below.  





Patient's diagnosis is in the hospital included acute kidney injury secondary to

hypotension.  Patient also had acute abdomen with perforated bowel, status post 

explorative laparotomy, sigmoid colectomy and colostomy.  Hypoxic acute 

respiratory failure, status post extubation.  Also had sepsis related to intra-

abdominal source and bowel perforation with fecal peritonitis.





Her most recent chest x-ray showed improving left basilar acute infiltrate 

and/or atelectasis.  2-D echo showed EF 60-65%.  Borderline concentric LVH.  

Right ventricle is normal in size.  Left atrial size is normal.  Interatrial and

interventricular septum intact.  Mild mitral regurgitation.  CT of abdomen and 

pelvis from 2020 showed diffusely thickened small bowel throughout the 

abdomen.  Small droplets of free air as well as small amount of ascites.  

Evidence of multiple previous surgeries including small bowel resection and 

gastric surgery.  Degenerative changes within the lumbar spine and dorsal spine.

 Patient's blood test shows normal CBC with mildly elevated WBCs 12.3.  Sodium 

is normal 140, potassium 3.1, BUN 34, creatinine 1.39.  Corona virus PCR 

negative.





Review of Systems


ROS unobtainable: due to mental status





Past Medical History


Past Medical History: Thyroid Disorder


History of Any Multi-Drug Resistant Organisms: None Reported


Past Surgical History: Bariatric Surgery,  Section, Cholecystectomy, 

Tonsillectomy


Additional Past Surgical History / Comment(s): gastric bypass, sinus


Past Psychological History: Depression


Smoking Status: Current every day smoker


Past Alcohol Use History: None Reported


Past Drug Use History: None Reported





Medications and Allergies


                                Home Medications











 Medication  Instructions  Recorded  Confirmed  Type


 


Baclofen [Lioresal] 20 mg PO HS 18 History


 


FLUoxetine HCL [PROzac] 20 mg PO DAILY 18 History


 


Levothyroxine Sodium [Synthroid] 125 mcg PO DAILY 18 History


 


Propranolol HCl [Inderal LA] 80 mg PO HS 18 History


 


Rizatriptan Benzoate [Maxalt MLT] 10 mg PO DAILY PRN 18 History


 


Butalb/APAP/Caff -40Mg 1 tab PO Q4H PRN 20 History





[Fioricet -40]    


 


Chlorthalidone 50 mg PO DAILY 20 History


 


Cholecalciferol [Vitamin D3 (25 1,000 unit PO DAILY 20 History





Mcg = 1000 Iu)]    


 


Multivitamins, Thera [Multivitamin 1 tab PO DAILY 20 History





(formulary)]    








                                    Allergies











Allergy/AdvReac Type Severity Reaction Status Date / Time


 


latex Allergy  Rash/Hives Verified 20 12:08


 


Sulfa (Sulfonamide Allergy  Unknown Verified 20 12:08





Antibiotics)   Childhood  














Physical Examination





- Vital Signs


Vital Signs: 


                                   Vital Signs











  Temp Pulse Resp BP Pulse Ox


 


 20 15:00   92  14   98


 


 20 14:00   103 H  25 H   99


 


 20 13:00   100  15   97


 


 20 12:00  97.6 F  102 H  15   100


 


 20 11:44   103 H   


 


 20 11:35   106 H   


 


 20 11:00   103 H  25 H  124/92  100


 


 20 10:00   104 H  24  131/89  100


 


 20 09:00   98  24  


 


 20 08:15   124 H   


 


 20 08:00   120 H  24   96


 


 20 07:54   123 H   


 


 20 07:00   117 H  18   98


 


 20 06:00  99.9 F H  116 H  29 H  125/86  99


 


 20 05:00   117 H  13  117/83  98


 


 20 04:00  97.2 F L  117 H  25 H  120/81  98


 


 20 03:26   117 H   


 


 20 03:10   118 H   


 


 20 03:00   118 H  11 L  120/81  98


 


 20 02:00   121 H  14  111/81  98


 


 20 01:00   123 H  28 H   99


 


 20 00:06   126 H  27 H  115/80  98


 


 20 00:00  100.4 F H  126 H  26 H  119/80  99


 


 20 23:29   126 H   


 


 20 23:15   128 H   


 


 20 23:00   128 H  26 H  120/83  99


 


 20 22:00   128 H  21  115/83  98


 


 20 21:00   128 H  20  110/80  98


 


 20 20:00  99 F  128 H  21  114/79  96


 


 20 19:39   125 H   


 


 20 19:23   128 H   


 


 20 19:00   128 H  27 H  107/78  99


 


 20 18:00   129 H  15  109/75  98


 


 20 17:00   128 H  23  109/75  98


 


 20 16:00  98.8 F  125 H  26 H  105/73  98


 


 20 15:50   125 H   








                                Intake and Output











 20





 06:59 14:59 22:59


 


Intake Total 5676.968 6160 176


 


Output Total 1585 2175 125


 


Balance -442.735 -648 51


 


Intake:   


 


   955 125


 


    Pipercillin/ Tazobactam 3  75 25





    .375 g   


 


    Sodium Chloride 0.45% 1, 800 780 100





    000 ml @ 100 mls/hr IV .   





    Q10H [facility] Rx#:[id number]   


 


    metroNIDAZOLE-NS  100 100 





    mg   


 


  Intake, IV Titration 242.680 352 51





  Amount   


 


    Fat Emulsion 20% 250 ml @  42 21





    20.833 mls/hr IV DAILY@   





    1200 [facility] Rx#:[id number]   


 


    Potassium Chloride 20 meq 50  





    In Water For Injection 1   





    100ml.bag @ 50 mls/hr   





    IVPB Q2H [facility] Rx#:   





    [id number]   


 


    Potassium Chloride 20 meq  250 





    In Water For Injection 1   





    100ml.bag @ 50 mls/hr   





    IVPB Q2H [facility] Rx#:   





    [id number]   


 


    Propofol 1,000 mg In 192.680  





    Empty Bag 1 bag @ Titrate   





    IV .Q0M [facility] Rx#:   





    [id number]   


 


    Sodium Acetate 30 meq  60 30





    Potassium Chloride 20 meq   





    Potassium Acetate 20 meq   





    Calcium Gluconate 1 gm   





    Mvi, Adult No.4 with Vit   





    K 10 ml Trace (Conc-1Ml/   





    Dose) 1 ml In Amino Acid   





    5%-D15w 1,000 ml @ 30 mls   





    /hr IV .Q24H [facility] Rx#:   





    [id number]   


 


Output:   


 


  Drainage 50  


 


    Right Lower Abdomen 50  


 


  Urine 1535 2175 125


 


Other:   


 


  Voiding Method Indwelling Catheter Indwelling Catheter 


 


  Weight  66.2 kg 








                         ABP, PAP, CO, CI - Last 8 Hours











Arterial Blood Pressure        113/62


 


Arterial Blood Pressure        136/73


 


Arterial Blood Pressure        123/73


 


Arterial Blood Pressure        129/87


 


Arterial Blood Pressure        146/77


 


Arterial Blood Pressure        137/78


 


Arterial Blood Pressure        138/85


 


Arterial Blood Pressure        137/86

















On examination patient is an elderly  female, who appears 

encephalopathic.  Patient is off sedation.  Patient while being examined, 

started to respond much better.  Patient was able to follow some commands, speak

some words.  Her pupils are round and reacting.  Visual fields could not be 

tested.  Face appears symmetric.  Oculocephalics appears present.  Patient did 

not protrude her tongue.  Palate and shoulder shrug could not be tested.  

Hearing could not be tested.  Patient does follow some commands.  Patient moves 

her arms and legs equally to noxious stimuli.  Patient wiggle her toes and feet 

on command.  Sensations appears equal to painful stimuli.  Cerebellar functions 

could not be tested.  Tone is equal bilaterally.  Reflexes are 1+ and plantars 

are upgoing bilaterally.  No obvious bruit, S1 and S2 audible.  No peripheral 

edema.





Results





- Laboratory Findings


CBC and BMP: 


                                 20 04:30





                                 20 11:15


Abnormal Lab Findings: 


                                  Abnormal Labs











  20





  11:00 11:00 11:00


 


WBC   


 


RBC   


 


Hgb  16.2 H  


 


Hct  50.4 H  


 


RDW  16.5 H  


 


Neutrophils # (Manual)   


 


Lymphocytes # (Manual)   


 


Metamyelocytes # (Man)   


 


APTT   21.2 L 


 


ABG pH   


 


ABG pCO2   


 


ABG pO2   


 


ABG HCO3   


 


ABG O2 Saturation   


 


Potassium   


 


Chloride    108 H


 


Carbon Dioxide   


 


BUN   


 


Creatinine   


 


Glucose    138 H


 


POC Glucose (mg/dL)   


 


Calcium   


 


Ionized Calcium Mary   


 


Phosphorus   


 


Magnesium   


 


AST   


 


Troponin I   


 


Total Protein    6.2 L


 


Albumin   


 


Triglycerides   


 


Urine Appearance   


 


Ur Specific Gravity   


 


Urine Protein   


 


Urine Ketones   


 


Urine Blood   


 


Urine RBC   


 


Urine WBC   


 


Urine Bacteria   


 


Hyaline Casts   


 


Urine Mucus   














  06/14/20 06/15/20 06/15/20





  23:42 00:03 01:49


 


WBC   


 


RBC   


 


Hgb   


 


Hct   


 


RDW   


 


Neutrophils # (Manual)   


 


Lymphocytes # (Manual)   


 


Metamyelocytes # (Man)   


 


APTT   


 


ABG pH   7.17 L* 


 


ABG pCO2   63 H 


 


ABG pO2   130 H 


 


ABG HCO3   


 


ABG O2 Saturation   


 


Potassium   


 


Chloride   


 


Carbon Dioxide   


 


BUN   


 


Creatinine   


 


Glucose   


 


POC Glucose (mg/dL)  130 H  


 


Calcium   


 


Ionized Calcium Mary   


 


Phosphorus   


 


Magnesium   


 


AST   


 


Troponin I   


 


Total Protein   


 


Albumin   


 


Triglycerides   


 


Urine Appearance    Cloudy H


 


Ur Specific Gravity    1.045 H


 


Urine Protein    1+ H


 


Urine Ketones    Trace H


 


Urine Blood    Moderate H


 


Urine RBC    >182 H


 


Urine WBC    13 H


 


Urine Bacteria    Rare H


 


Hyaline Casts    13 H


 


Urine Mucus    Occasional H














  06/15/20 06/15/20 06/15/20





  04:40 04:40 05:02


 


WBC   


 


RBC  5.65 H  


 


Hgb  17.6 H  


 


Hct  55.5 H  


 


RDW  16.7 H  


 


Neutrophils # (Manual)   


 


Lymphocytes # (Manual)  0.68 L  


 


Metamyelocytes # (Man)   


 


APTT   


 


ABG pH    7.30 L


 


ABG pCO2   


 


ABG pO2   


 


ABG HCO3    18 L


 


ABG O2 Saturation   


 


Potassium   


 


Chloride   117 H 


 


Carbon Dioxide   18 L 


 


BUN   21 H 


 


Creatinine   


 


Glucose   137 H 


 


POC Glucose (mg/dL)   


 


Calcium   7.4 L 


 


Ionized Calcium Mary   


 


Phosphorus   5.0 H 


 


Magnesium   2.5 H 


 


AST   


 


Troponin I   


 


Total Protein   4.1 L 


 


Albumin   2.1 L 


 


Triglycerides   


 


Urine Appearance   


 


Ur Specific Gravity   


 


Urine Protein   


 


Urine Ketones   


 


Urine Blood   


 


Urine RBC   


 


Urine WBC   


 


Urine Bacteria   


 


Hyaline Casts   


 


Urine Mucus   














  06/15/20 06/15/20 06/15/20





  05:54 08:41 10:17


 


WBC   


 


RBC   


 


Hgb   


 


Hct   


 


RDW   


 


Neutrophils # (Manual)   


 


Lymphocytes # (Manual)   


 


Metamyelocytes # (Man)   


 


APTT   


 


ABG pH   7.27 L 


 


ABG pCO2   


 


ABG pO2   


 


ABG HCO3   18 L 


 


ABG O2 Saturation   


 


Potassium   


 


Chloride   


 


Carbon Dioxide   


 


BUN   


 


Creatinine   


 


Glucose   


 


POC Glucose (mg/dL)  126 H  


 


Calcium   


 


Ionized Calcium Mary   


 


Phosphorus   


 


Magnesium   


 


AST   


 


Troponin I    0.108 H*


 


Total Protein   


 


Albumin   


 


Triglycerides   


 


Urine Appearance   


 


Ur Specific Gravity   


 


Urine Protein   


 


Urine Ketones   


 


Urine Blood   


 


Urine RBC   


 


Urine WBC   


 


Urine Bacteria   


 


Hyaline Casts   


 


Urine Mucus   














  06/15/20 06/15/20 06/16/20





  11:37 23:34 04:15


 


WBC    13.0 H


 


RBC   


 


Hgb   


 


Hct    49.1 H


 


RDW    17.2 H


 


Neutrophils # (Manual)    12.00 H


 


Lymphocytes # (Manual)    0.52 L


 


Metamyelocytes # (Man)    0.39 H


 


APTT   


 


ABG pH   


 


ABG pCO2   


 


ABG pO2   


 


ABG HCO3   


 


ABG O2 Saturation   


 


Potassium   


 


Chloride   


 


Carbon Dioxide   


 


BUN   


 


Creatinine   


 


Glucose   


 


POC Glucose (mg/dL)  101 H  110 H 


 


Calcium   


 


Ionized Calcium Mary   


 


Phosphorus   


 


Magnesium   


 


AST   


 


Troponin I   


 


Total Protein   


 


Albumin   


 


Triglycerides   


 


Urine Appearance   


 


Ur Specific Gravity   


 


Urine Protein   


 


Urine Ketones   


 


Urine Blood   


 


Urine RBC   


 


Urine WBC   


 


Urine Bacteria   


 


Hyaline Casts   


 


Urine Mucus   














  20





  04:15 05:20 18:31


 


WBC   


 


RBC   


 


Hgb   


 


Hct   


 


RDW   


 


Neutrophils # (Manual)   


 


Lymphocytes # (Manual)   


 


Metamyelocytes # (Man)   


 


APTT   


 


ABG pH   7.24 L 


 


ABG pCO2   


 


ABG pO2   


 


ABG HCO3   18 L 


 


ABG O2 Saturation   


 


Potassium   


 


Chloride  116 H  


 


Carbon Dioxide  17 L  


 


BUN  35 H  


 


Creatinine  1.50 H  


 


Glucose  101 H  


 


POC Glucose (mg/dL)    100 H


 


Calcium  6.9 L  


 


Ionized Calcium Mary   


 


Phosphorus   


 


Magnesium   


 


AST   


 


Troponin I   


 


Total Protein   


 


Albumin   


 


Triglycerides   


 


Urine Appearance   


 


Ur Specific Gravity   


 


Urine Protein   


 


Urine Ketones   


 


Urine Blood   


 


Urine RBC   


 


Urine WBC   


 


Urine Bacteria   


 


Hyaline Casts   


 


Urine Mucus   














  20





  00:10 04:30 04:30


 


WBC   12.3 H 


 


RBC   


 


Hgb   


 


Hct   


 


RDW   17.5 H 


 


Neutrophils # (Manual)   10.50 H 


 


Lymphocytes # (Manual)   0.98 L 


 


Metamyelocytes # (Man)   0.12 H 


 


APTT   


 


ABG pH   


 


ABG pCO2   


 


ABG pO2   


 


ABG HCO3   


 


ABG O2 Saturation   


 


Potassium    2.8 L


 


Chloride    111 H


 


Carbon Dioxide    21 L


 


BUN    38 H


 


Creatinine    1.42 H


 


Glucose    107 H


 


POC Glucose (mg/dL)  102 H  


 


Calcium    7.0 L


 


Ionized Calcium Mary   


 


Phosphorus   


 


Magnesium   


 


AST   


 


Troponin I   


 


Total Protein   


 


Albumin   


 


Triglycerides   


 


Urine Appearance   


 


Ur Specific Gravity   


 


Urine Protein   


 


Urine Ketones   


 


Urine Blood   


 


Urine RBC   


 


Urine WBC   


 


Urine Bacteria   


 


Hyaline Casts   


 


Urine Mucus   














  20





  04:30 05:08 05:14


 


WBC   


 


RBC   


 


Hgb   


 


Hct   


 


RDW   


 


Neutrophils # (Manual)   


 


Lymphocytes # (Manual)   


 


Metamyelocytes # (Man)   


 


APTT   


 


ABG pH    7.32 L


 


ABG pCO2   


 


ABG pO2    137 H


 


ABG HCO3   


 


ABG O2 Saturation    98.4 H


 


Potassium   


 


Chloride   


 


Carbon Dioxide   


 


BUN   


 


Creatinine   


 


Glucose   


 


POC Glucose (mg/dL)   109 H 


 


Calcium   


 


Ionized Calcium Mary   


 


Phosphorus   


 


Magnesium  2.5 H  


 


AST   


 


Troponin I   


 


Total Protein   


 


Albumin   


 


Triglycerides   


 


Urine Appearance   


 


Ur Specific Gravity   


 


Urine Protein   


 


Urine Ketones   


 


Urine Blood   


 


Urine RBC   


 


Urine WBC   


 


Urine Bacteria   


 


Hyaline Casts   


 


Urine Mucus   














  20





  10:18 11:13 11:15


 


WBC   


 


RBC   


 


Hgb   


 


Hct   


 


RDW   


 


Neutrophils # (Manual)   


 


Lymphocytes # (Manual)   


 


Metamyelocytes # (Man)   


 


APTT   


 


ABG pH   


 


ABG pCO2   


 


ABG pO2   


 


ABG HCO3   


 


ABG O2 Saturation   


 


Potassium  3.4 L   3.1 L


 


Chloride  112 H  


 


Carbon Dioxide   


 


BUN  34 H  


 


Creatinine  1.39 H  


 


Glucose  134 H  


 


POC Glucose (mg/dL)   141 H 


 


Calcium  6.9 L  


 


Ionized Calcium Mary    4.4 L


 


Phosphorus   


 


Magnesium   


 


AST  48 H  


 


Troponin I   


 


Total Protein  4.3 L  


 


Albumin  2.1 L  


 


Triglycerides  740 H  


 


Urine Appearance   


 


Ur Specific Gravity   


 


Urine Protein   


 


Urine Ketones   


 


Urine Blood   


 


Urine RBC   


 


Urine WBC   


 


Urine Bacteria   


 


Hyaline Casts   


 


Urine Mucus   














Assessment and Plan


Assessment: 





* Altered mental status, likely related to toxic metabolic encephalopathy.  

  Etiology multifactorial, as mentioned below.  Patient has started to show 

  clinical response and clinical improvement.


* Acute kidney injury, secondary to hypotension, improving.


* Acute abdomen with perforated bowel, status post exploratory laparotomy, 

  sigmoid colectomy and colostomy.


* Status post hypoxic acute respiratory failure, status post extubation.


* Sepsis related to intra-abdominal source and bowel perforation with fecal 

  peritonitis.


Plan: 





* Patient is showing signs of definite clinical improvement.


* Hopefully her mentation will further improve hour by hour, day by day.


* We will follow clinically.


* Your medical management.

## 2020-06-17 NOTE — XR
EXAMINATION TYPE: XR chest 1V portable

 

DATE OF EXAM: 6/17/2020

 

CLINICAL HISTORY: Difficulty breathing progress study.  

 

TECHNIQUE: Single AP portable semiupright view of the chest is obtained.

 

COMPARISON: Chest x-ray from one day earlier and older studies

 

FINDINGS:  Stable endotracheal tube and right internal jugular central venous catheter. Improved left
 basilar opacity. Right lung remains clear. Cardiac silhouette size is stable and within normal limit
s. Osseous structures are intact.

 

 

IMPRESSION: Improving left basilar acute infiltrate and/or atelectasis.

## 2020-06-17 NOTE — P.CONS
History of Present Illness





- Reason for Consult


Consult date: 20


Perforated diverticulitis and antibiotic recommendation


Requesting physician: Socorro Reed





- Chief Complaint


Abdominal pain  few days





- History of Present Illness


Patient is a 59 year  female who presented to the ER at [facility] on 2020 with the chief complaint generalized abdominal pain

and apparently the patient will be started the day before specific the hospital 

patient describing the pain to be more of a crampy in nature she did have nausea

but no vomiting the patient has been passing gas and did have small bowel 

movement this morning with worsening symptoms the patient did present to the 

hospital, on route via the patient was afebrile subsequent to spike a fever of 

101F and did spike a fever for the last 2 days, patient did have a normal white

count initially however the white count was up to 13,000 yesterday and is down 

12.3 today, patient did have severe abdominal pelvis completed on admission 

which was diffusely thickened small bowel throughout the abdomen small droplets 

of free air as well as small amount of ascites, patient was by surgery she was 

taken to the one she was noticed to have a rectosigmoid colon with fecal 

peritonitis, descending and sigmoid colon infection appendicolith with 

appendicitis the patient is status post excisional debridement sigmoid colectomy

and descending colostomy creation this infection was sigmoid colon open 

appendectomy, patient has been in the ICU since surgery patient has been treated

with the TPN and Zosyn, infectious disease was consulted last night for further 

management of antibiotic therapy, most information has been obtained from review

the chart as the patient currently in restraints is pleasantly confused and is 

unable to provide reliable history





Review of Systems


Positive points has been mentioned in HPI complete review could not be obtained 

because of his underlying mental status








Past Medical History


Past Medical History: Thyroid Disorder


History of Any Multi-Drug Resistant Organisms: None Reported


Past Surgical History: Bariatric Surgery,  Section, Cholecystectomy, 

Tonsillectomy


Additional Past Surgical History / Comment(s): gastric bypass, sinus


Past Psychological History: Depression


Smoking Status: Current every day smoker


Past Alcohol Use History: None Reported


Past Drug Use History: None Reported





Medications and Allergies


                                Home Medications











 Medication  Instructions  Recorded  Confirmed  Type


 


Baclofen [Lioresal] 20 mg PO HS 18 History


 


FLUoxetine HCL [PROzac] 20 mg PO DAILY 18 History


 


Levothyroxine Sodium [Synthroid] 125 mcg PO DAILY 18 History


 


Propranolol HCl [Inderal LA] 80 mg PO HS 18 History


 


Rizatriptan Benzoate [Maxalt MLT] 10 mg PO DAILY PRN 18 History


 


Butalb/APAP/Caff -40Mg 1 tab PO Q4H PRN 20 History





[Fioricet -40]    


 


Chlorthalidone 50 mg PO DAILY 20 History


 


Cholecalciferol [Vitamin D3 (25 1,000 unit PO DAILY 20 History





Mcg = 1000 Iu)]    


 


Multivitamins, Thera [Multivitamin 1 tab PO DAILY 20 History





(formulary)]    








                                    Allergies











Allergy/AdvReac Type Severity Reaction Status Date / Time


 


latex Allergy  Rash/Hives Verified 20 12:08


 


Sulfa (Sulfonamide Allergy  Unknown Verified 20 12:08





Antibiotics)   Childhood  














Physical Exam


Vitals: 


                                   Vital Signs











  Temp Pulse Resp BP Pulse Ox


 


 20 12:00   102 H  25 H   100


 


 20 11:44   103 H   


 


 20 11:35   106 H   


 


 20 11:00   103 H  25 H  124/92  100


 


 20 10:00   104 H  24  131/89  100


 


 20 09:00   98  24  


 


 20 08:15   124 H   


 


 20 08:00   120 H  24   96


 


 20 07:54   123 H   


 


 20 07:00   117 H  18   98


 


 20 06:00  99.9 F H  116 H  29 H  125/86  99


 


 20 05:00   117 H  13  117/83  98


 


 20 04:00  97.2 F L  117 H  25 H  120/81  98


 


 20 03:26   117 H   


 


 20 03:10   118 H   


 


 20 03:00   118 H  11 L  120/81  98


 


 20 02:00   121 H  14  111/81  98


 


 20 01:00   123 H  28 H   99


 


 20 00:06   126 H  27 H  115/80  98


 


 20 00:00  100.4 F H  126 H  26 H  119/80  99


 


 20 23:29   126 H   


 


 20 23:15   128 H   


 


 20 23:00   128 H  26 H  120/83  99


 


 20 22:00   128 H  21  115/83  98


 


 20 21:00   128 H  20  110/80  98


 


 20 20:00  99 F  128 H  21  114/79  96


 


 20 19:39   125 H   


 


 20 19:23   128 H   


 


 20 19:00   128 H  27 H  107/78  99


 


 20 18:00   129 H  15  109/75  98


 


 20 17:00   128 H  23  109/75  98


 


 20 16:00  98.8 F  125 H  26 H  105/73  98


 


 20 15:50   125 H   


 


 20 15:16   126 H   


 


 20 15:00   126 H  26 H  107/75  98


 


 20 14:00   131 H  23  118/87  97


 


 20 13:00   131 H  23  118/80  98








                                Intake and Output











 20





 22:59 06:59 14:59


 


Intake Total 5004.706 9149.680 855


 


Output Total 1625 1585 1825


 


Balance -195.910 -442.320 -970


 


Intake:   


 


   900 705


 


    Pipercillin/ Tazobactam 3 100  25





    .375 g   


 


    Sodium Chloride 0.45% 1, 400 800 580





    000 ml @ 100 mls/hr IV .   





    Q10H ABIGAIL Rx#:[id number]   


 


    metroNIDAZOLE-NS  100 100 100





    mg   


 


  Intake, IV Titration 829.090 242.680 150





  Amount   


 


    Furosemide 100 mg In 100  





    Sodium Chloride 0.9% 90   





    ml @ 5 MG/HR 5 mls/hr IV   





    .Q20H ABIGAIL Rx#:[id number]   


 


    Potassium Chloride 20 meq  50 





    In Water For Injection 1   





    100ml.bag @ 50 mls/hr   





    IVPB Q2H ABIGAIL Rx#:   





    [id number]   


 


    Potassium Chloride 20 meq   150





    In Water For Injection 1   





    100ml.bag @ 50 mls/hr   





    IVPB Q2H ABIGAIL Rx#:   





    [id number]   


 


    Propofol 1,000 mg In 129.090 192.680 





    Empty Bag 1 bag @ Titrate   





    IV .Q0M ABIGAIL Rx#:   





    [id number]   


 


    Sodium Chloride 0.45% 1, 600  





    000 ml @ 100 mls/hr IV .   





    Q10H ABIGAIL Rx#:[id number]   


 


Output:   


 


  Drainage  50 


 


    Right Lower Abdomen  50 


 


  Urine 1625 1535 1825


 


Other:   


 


  Voiding Method Indwelling Catheter Indwelling Catheter Indwelling Catheter


 


  Weight   66.2 kg








                         ABP, PAP, CO, CI - Last 8 Hours











Arterial Blood Pressure        129/87


 


Arterial Blood Pressure        146/77


 


Arterial Blood Pressure        137/78


 


Arterial Blood Pressure        138/85


 


Arterial Blood Pressure        137/86


 


Arterial Blood Pressure        155/88


 


Arterial Blood Pressure        130/82


 


Arterial Blood Pressure        131/83














GENERAL DESCRIPTION: An elderly female male lying in bed, no distress. No 

tachypnea or accessory muscle of respiration use.


HEENT: Shows Pallor , no scleral icterus. Oral mucous membrane is dry. No 

pharyngeal erythema or thrush


NECK: Trachea central, no thyromegaly.


LUNGS: Unlabored breathing. Clear to auscultation anteriorly. No wheeze or 

crackle.


HEART: S1, S2, regular rate and rhythm. No loud murmur


ABDOMEN: Soft, mild epigastric tenderness , no guarding or rigidity, no 

organomegaly


EXTREMITIES: No edema of feet.


SKIN: No rash, no masses palpable.


NEUROLOGICAL: The patient is awake, but pleasantly confused and unable to 

complete neuro-Exam











Results


CBC & Chem 7: 


                                 20 04:30





                                 20 11:15


Labs: 


                  Abnormal Lab Results - Last 24 Hours (Table)











  20 Range/Units





  18:31 00:10 04:30 


 


WBC    12.3 H  (3.8-10.6)  k/uL


 


RDW    17.5 H  (11.5-15.5)  %


 


Neutrophils # (Manual)    10.50 H  (1.3-7.7)  k/uL


 


Lymphocytes # (Manual)    0.98 L  (1.0-4.8)  k/uL


 


Metamyelocytes # (Man)    0.12 H  (0)  k/uL


 


ABG pH     (7.35-7.45)  


 


ABG pO2     ()  mmHg


 


ABG O2 Saturation     (94-97)  %


 


Potassium     (3.5-5.1)  mmol/L


 


Chloride     ()  mmol/L


 


Carbon Dioxide     (22-30)  mmol/L


 


BUN     (7-17)  mg/dL


 


Creatinine     (0.52-1.04)  mg/dL


 


Glucose     (74-99)  mg/dL


 


POC Glucose (mg/dL)  100 H  102 H   (75-99)  mg/dL


 


Calcium     (8.4-10.2)  mg/dL


 


Ionized Calcium Mary     (4.5-5.3)  mg/dL


 


Magnesium     (1.6-2.3)  mg/dL


 


AST     (14-36)  U/L


 


Total Protein     (6.3-8.2)  g/dL


 


Albumin     (3.5-5.0)  g/dL














  20 Range/Units





  04:30 04:30 05:08 


 


WBC     (3.8-10.6)  k/uL


 


RDW     (11.5-15.5)  %


 


Neutrophils # (Manual)     (1.3-7.7)  k/uL


 


Lymphocytes # (Manual)     (1.0-4.8)  k/uL


 


Metamyelocytes # (Man)     (0)  k/uL


 


ABG pH     (7.35-7.45)  


 


ABG pO2     ()  mmHg


 


ABG O2 Saturation     (94-97)  %


 


Potassium  2.8 L    (3.5-5.1)  mmol/L


 


Chloride  111 H    ()  mmol/L


 


Carbon Dioxide  21 L    (22-30)  mmol/L


 


BUN  38 H    (7-17)  mg/dL


 


Creatinine  1.42 H    (0.52-1.04)  mg/dL


 


Glucose  107 H    (74-99)  mg/dL


 


POC Glucose (mg/dL)    109 H  (75-99)  mg/dL


 


Calcium  7.0 L    (8.4-10.2)  mg/dL


 


Ionized Calcium Mary     (4.5-5.3)  mg/dL


 


Magnesium   2.5 H   (1.6-2.3)  mg/dL


 


AST     (14-36)  U/L


 


Total Protein     (6.3-8.2)  g/dL


 


Albumin     (3.5-5.0)  g/dL














  20 Range/Units





  05:14 10:18 11:13 


 


WBC     (3.8-10.6)  k/uL


 


RDW     (11.5-15.5)  %


 


Neutrophils # (Manual)     (1.3-7.7)  k/uL


 


Lymphocytes # (Manual)     (1.0-4.8)  k/uL


 


Metamyelocytes # (Man)     (0)  k/uL


 


ABG pH  7.32 L    (7.35-7.45)  


 


ABG pO2  137 H    ()  mmHg


 


ABG O2 Saturation  98.4 H    (94-97)  %


 


Potassium   3.4 L   (3.5-5.1)  mmol/L


 


Chloride   112 H   ()  mmol/L


 


Carbon Dioxide     (22-30)  mmol/L


 


BUN   34 H   (7-17)  mg/dL


 


Creatinine   1.39 H   (0.52-1.04)  mg/dL


 


Glucose   134 H   (74-99)  mg/dL


 


POC Glucose (mg/dL)    141 H  (75-99)  mg/dL


 


Calcium   6.9 L   (8.4-10.2)  mg/dL


 


Ionized Calcium Mary     (4.5-5.3)  mg/dL


 


Magnesium     (1.6-2.3)  mg/dL


 


AST   48 H   (14-36)  U/L


 


Total Protein   4.3 L   (6.3-8.2)  g/dL


 


Albumin   2.1 L   (3.5-5.0)  g/dL














  20 Range/Units





  11:15 


 


WBC   (3.8-10.6)  k/uL


 


RDW   (11.5-15.5)  %


 


Neutrophils # (Manual)   (1.3-7.7)  k/uL


 


Lymphocytes # (Manual)   (1.0-4.8)  k/uL


 


Metamyelocytes # (Man)   (0)  k/uL


 


ABG pH   (7.35-7.45)  


 


ABG pO2   ()  mmHg


 


ABG O2 Saturation   (94-97)  %


 


Potassium  3.1 L  (3.5-5.1)  mmol/L


 


Chloride   ()  mmol/L


 


Carbon Dioxide   (22-30)  mmol/L


 


BUN   (7-17)  mg/dL


 


Creatinine   (0.52-1.04)  mg/dL


 


Glucose   (74-99)  mg/dL


 


POC Glucose (mg/dL)   (75-99)  mg/dL


 


Calcium   (8.4-10.2)  mg/dL


 


Ionized Calcium Mary  4.4 L  (4.5-5.3)  mg/dL


 


Magnesium   (1.6-2.3)  mg/dL


 


AST   (14-36)  U/L


 


Total Protein   (6.3-8.2)  g/dL


 


Albumin   (3.5-5.0)  g/dL








                      Microbiology - Last 24 Hours (Table)











 20 23:54 Gram Stain - Final





 Sputum Sputum Culture - Final


 


 06/15/20 16:30 Blood Culture - Preliminary





 Blood    No Growth after 24 hours


 


 06/15/20 16:25 Blood Culture - Preliminary





 Blood    No Growth after 24 hours


 


 06/15/20 01:49 Urine Culture - Final





 Urine,Voided 


 


 20 23:00 Gram Stain - Preliminary





 Abdomen Wound Culture - Preliminary














Assessment and Plan


Assessment: 


1-patient presented to hospital with sepsis in this patient who did her 

abdominal pain and fever tachycardia and elevated white count source is 

abdominal and has been noticed to have a sigmoid perforation status post sigmoid

colectomy as well as appendectomy, her abdominal cultures has been negative, 

will need to cover for enteric gram-negative both aerobes and anaerobes


(1) Sepsis


Current Visit: Yes   Status: Acute   Code(s): A41.9 - SEPSIS, UNSPECIFIED 

ORGANISM   SNOMED Code(s): 89565264


   





(2) Perforated sigmoid colon


Current Visit: Yes   Status: Acute   Code(s): K63.1 - PERFORATION OF INTESTINE 

(NONTRAUMATIC)   SNOMED Code(s): 138023767


   


Plan: 


1- patient with continued on Zosyn 3.375 g every 8 hours while waiting for 

condition stabilized


2- Gentle IV fluid and supportive care


We will follow on clinical condition and cultures to further adjust medication 

if needed


Thank you for this consultation will follow this patient with you





Time with Patient: Greater than 30

## 2020-06-17 NOTE — PN
PROGRESS NOTE



Mrs. Carter is a 59-year-old female who presented with ruptured viscus and underwent

sigmoid colectomy and descending colostomy with Roselia's procedure.



She remains intubated.  She is on sedation holiday and she is in sinus tachycardia.

Her blood pressure is on the higher side.  She is not following commands.  She had not

had any episode of hypotension.  Her urine output has been stable.  Her echocardiogram

reveals preserved ventricular size and systolic function.  There is no evidence of

significant valvular disease.



She continues to be at this time on heparin subcu, IV Lasix drip, in addition to her

antibiotics.



PHYSICAL EXAMINATION:

Blood pressure running in the 120s to 150s with a heart rate in the 110s to 130s with a

the T-max of 99.9.

LUNGS:  Clear to auscultation anteriorly.

HEART:  Tachycardic, S1, S2.  No S3. No rub.

ABDOMEN:  Soft.  Dressing in place.  Colostomy bag noted.  No output.

EXTREMITIES:  No edema.



LAB DATA:

Revealed a BUN and creatinine 38 and 1.42, potassium 2.8, hemoglobin of 14, white blood

cell of 12.3.  His pH 7.32, PO2 of 137.  Chest x-ray shows no acute infiltrate.



IMPRESSION:

1. Status post ruptured sigmoid and surgical intervention with colostomy and

    Roselia's procedure.

2. Sinus tachycardia, part of it is related to the surgery and the presenting event,

    but it could be some withdrawal from beta blocker.

3. Prior history of smoking.

4. Hypothyroidism.



RECOMMENDATION:

I will start the patient on IV metoprolol.  Continue rest of medical regimen.

Hopefully, she can be weaned and extubated and depending on her progress, further

recommendation will be made.



MMODL / IJN: [id number] / Job#: [id number]

## 2020-06-17 NOTE — P.PN
<LeoRoula NATALIA - Last Filed: 06/17/20 10:25>





Subjective


Progress Note Date: 06/17/20





CHIEF COMPLAINT: Abdominal pain





HISTORY OF PRESENT ILLNESS: 59-year-old female who underwent exploratory 

laparotomy with sigmoid colectomy, descending colostomy creation, and 

appendectomy with Dr. Reed on June 14, 2020. Patient examined this morning 

in the ICU. She has been extubated. She is currently restless and appears 

confused. Not able to have a conversation with provider. Lasix drip has been 

discontinued. Urine output remains good. Levophed drip has also been weaned off.

Ostomy with no stool output. WBC 12.3. Hemoglobin 14.0





PHYSICAL EXAM: 


VITAL SIGNS: Reviewed


GENERAL: Well-developed in no acute distress


HEENT:  No sclera icterus. Extraocular movements grossly intact.  Moist buccal 

mucosa. 


Head is atraumatic, normocephalic. No nasal drainage.


NECK:  Supple without lymphadenopathy.


CHEST:  Non-labored respirations and equal bilateral excursions


CARDIOVASCULAR:  Tachycardic.  Regular rate with regular rhythm.  Palpable 2+ 

radial pulses.


ABDOMEN:  Soft.  Nondistended. PREVENA wound vac noted. Ostomy to left side of 

abdomen. No stool or gas noted. Stoma pink. TERESSA drain with serosanguineous 

drainage.


MUSCULOSKELETAL:  No clubbing or cyanosis.


NEUROLOGIC:  Awake and alert. Restless. 


PSYCH:  Unable to assess secondary to altered mental status


SKIN: Well perfused.  Good skin turgor. 





ASSESSMENT: 


1.  Abdominal pain secondary to ruptured sigmoid colon, fecal peritonitis, 

descending and sigmoid colon impaction, appendicolith with appendicitis





PLAN: 


Orders placed to begin TPN today


Monitor TERESSA drain


Await ostomy function


Continue antibiotics. Monitor labs. 


Further ICU and ventilator management per Dr. Castaneda.





Nurse practitioner note has been reviewed by physician. Signing provider agrees 

with the documented findings, assessment, and plan of care. 








Objective





- Vital Signs


Vital signs: 


                                   Vital Signs











Temp  99.9 F H  06/17/20 06:00


 


Pulse  104 H  06/17/20 10:00


 


Resp  24   06/17/20 10:00


 


BP  131/89   06/17/20 10:00


 


Pulse Ox  100   06/17/20 10:00








                                 Intake & Output











 06/16/20 06/17/20 06/17/20





 18:59 06:59 18:59


 


Intake Total 1793.996 5879.346 550


 


Output Total 2380 2435 1375


 


Balance -536.196 -497.654 -825


 


Weight   66.2 kg


 


Intake:   


 


   1400 550


 


    Pipercillin/ Tazobactam 3 100 100 150





    .375 g   


 


    Sodium Chloride 0.45% 1,  1200 400





    000 ml @ 100 mls/hr IV .   





    Q10H [facility] Rx#:[id number]   


 


    Sodium Chloride 0.9% 1, 100  





    000 ml @ 100 mls/hr IV .   





    Q10H [facility] Rx#:[id number]   


 


    metroNIDAZOLE-NS  200 100 





    mg   


 


  Intake, IV Titration 1443.804 537.346 





  Amount   


 


    Furosemide 100 mg In 184.167  





    Sodium Chloride 0.9% 90   





    ml @ 5 MG/HR 5 mls/hr IV   





    .Q20H [facility] Rx#:[id number]   


 


    Norepinephrine 32 mg In 7.794  





    Sodium Chloride 0.9% 218   





    ml @ 0.05 MCG/KG/MIN 1.   





    595 mls/hr IV .Q24H [facility]   





    Rx#:[id number]   


 


    Potassium Chloride 20 meq  50 





    In Water For Injection 1   





    100ml.bag @ 50 mls/hr   





    IVPB Q2H [facility] Rx#:   





    [id number]   


 


    Propofol 1,000 mg In 183.391 287.346 





    Empty Bag 1 bag @ Titrate   





    IV .Q0M [facility] Rx#:   





    [id number]   


 


    Sodium Chloride 0.45% 1, 1000 200 





    000 ml @ 100 mls/hr IV .   





    Q10H [facility] Rx#:[id number]   


 


    fentaNYL (PF) 1,000 mcg 68.452  





    In Sodium Chloride 0.9%   





    80 ml @ Per Protocol IV .   





    Q0M [facility] Rx#:[id number]   


 


Output:   


 


  Drainage 50 50 


 


    Right Lower Abdomen 50 50 


 


  Urine 2330 2385 1375


 


Other:   


 


  Voiding Method Indwelling Catheter Indwelling Catheter 








                       ABP, PAP, CO, CI - Last Documented











Arterial Blood Pressure        137/78

















- Labs


CBC & Chem 7: 


                                 06/17/20 04:30





                                 06/17/20 04:30


Labs: 


                  Abnormal Lab Results - Last 24 Hours (Table)











  06/16/20 06/17/20 06/17/20 Range/Units





  18:31 00:10 04:30 


 


WBC    12.3 H  (3.8-10.6)  k/uL


 


RDW    17.5 H  (11.5-15.5)  %


 


Neutrophils # (Manual)    10.50 H  (1.3-7.7)  k/uL


 


Lymphocytes # (Manual)    0.98 L  (1.0-4.8)  k/uL


 


Metamyelocytes # (Man)    0.12 H  (0)  k/uL


 


ABG pH     (7.35-7.45)  


 


ABG pO2     ()  mmHg


 


ABG O2 Saturation     (94-97)  %


 


Potassium     (3.5-5.1)  mmol/L


 


Chloride     ()  mmol/L


 


Carbon Dioxide     (22-30)  mmol/L


 


BUN     (7-17)  mg/dL


 


Creatinine     (0.52-1.04)  mg/dL


 


Glucose     (74-99)  mg/dL


 


POC Glucose (mg/dL)  100 H  102 H   (75-99)  mg/dL


 


Calcium     (8.4-10.2)  mg/dL


 


Magnesium     (1.6-2.3)  mg/dL














  06/17/20 06/17/20 06/17/20 Range/Units





  04:30 04:30 05:08 


 


WBC     (3.8-10.6)  k/uL


 


RDW     (11.5-15.5)  %


 


Neutrophils # (Manual)     (1.3-7.7)  k/uL


 


Lymphocytes # (Manual)     (1.0-4.8)  k/uL


 


Metamyelocytes # (Man)     (0)  k/uL


 


ABG pH     (7.35-7.45)  


 


ABG pO2     ()  mmHg


 


ABG O2 Saturation     (94-97)  %


 


Potassium  2.8 L    (3.5-5.1)  mmol/L


 


Chloride  111 H    ()  mmol/L


 


Carbon Dioxide  21 L    (22-30)  mmol/L


 


BUN  38 H    (7-17)  mg/dL


 


Creatinine  1.42 H    (0.52-1.04)  mg/dL


 


Glucose  107 H    (74-99)  mg/dL


 


POC Glucose (mg/dL)    109 H  (75-99)  mg/dL


 


Calcium  7.0 L    (8.4-10.2)  mg/dL


 


Magnesium   2.5 H   (1.6-2.3)  mg/dL














  06/17/20 Range/Units





  05:14 


 


WBC   (3.8-10.6)  k/uL


 


RDW   (11.5-15.5)  %


 


Neutrophils # (Manual)   (1.3-7.7)  k/uL


 


Lymphocytes # (Manual)   (1.0-4.8)  k/uL


 


Metamyelocytes # (Man)   (0)  k/uL


 


ABG pH  7.32 L  (7.35-7.45)  


 


ABG pO2  137 H  ()  mmHg


 


ABG O2 Saturation  98.4 H  (94-97)  %


 


Potassium   (3.5-5.1)  mmol/L


 


Chloride   ()  mmol/L


 


Carbon Dioxide   (22-30)  mmol/L


 


BUN   (7-17)  mg/dL


 


Creatinine   (0.52-1.04)  mg/dL


 


Glucose   (74-99)  mg/dL


 


POC Glucose (mg/dL)   (75-99)  mg/dL


 


Calcium   (8.4-10.2)  mg/dL


 


Magnesium   (1.6-2.3)  mg/dL








                      Microbiology - Last 24 Hours (Table)











 06/14/20 23:54 Gram Stain - Final





 Sputum Sputum Culture - Final


 


 06/15/20 16:30 Blood Culture - Preliminary





 Blood    No Growth after 24 hours


 


 06/15/20 16:25 Blood Culture - Preliminary





 Blood    No Growth after 24 hours


 


 06/15/20 01:49 Urine Culture - Final





 Urine,Voided 


 


 06/14/20 23:00 Gram Stain - Preliminary





 Abdomen Wound Culture - Preliminary














<Socorro Reed N - Last Filed: 06/26/20 03:00>





Subjective





Patient seen and evaluated with nurse practitioner.  Additional findings include

neurological changes with altered mental status.  May need neurology 

consultation for altered mental status.  Otherwise, she has pre-existing history

of iatrogenic malnutrition from gastric bypass, recommend obtaining full 

bariatric metabolic panel.  Severe nutritional deficiencies including thiamine 

deficiency may increase risk for metabolic encephalopathy.  In the interim, 

start parenteral nutrition.  Monitor phosphate for risk of refeeding syndrome.





Objective





- Vital Signs


Vital signs: 


                                   Vital Signs











Temp  98.1 F   06/26/20 00:00


 


Pulse  79   06/26/20 02:00


 


Resp  22   06/26/20 02:00


 


BP  95/74   06/25/20 20:00


 


Pulse Ox  96   06/26/20 02:00








                                 Intake & Output











 06/25/20 06/25/20 06/26/20





 06:59 18:59 06:59


 


Intake Total 1300 2538.2 876


 


Output Total 1815 2300 1300


 


Balance -515 238.2 -424


 


Weight 79 kg  


 


Intake:   


 


  IV 1300 1226 876


 


    Ampicillin-Sulbactam 3 gm 200 100 100





    In Sodium Chloride 0.9%   





    100 ml @ 200 mls/hr IVPB   





    Q6HR [facility] Rx#:[id number]   


 


    Fat Emulsion 20% 250 mL@  126 126





    20.833mls/hr   


 


    Mvi, Adult No.4 with Vit 660 660 





    K 10 ml Trace (Conc-1Ml/   





    Dose) 1 ml Potassium   





    Chloride 60 meq Potassium   





    Phosphate 30 mmol   





    Magnesium Sulfate gm 0.6   





    gm Calcium Gluconate 1 gm   





    In Amino Acid 5%-D15w 1,   





    000 ml @ 55 mls/hr IV .   





    [id number] [facility] Rx#:[id number]   


 


    Mvi, Adult No.4 with Vit   385





    K 10 ml Trace (Conc-1Ml/   





    Dose) 1 ml Potassium   





    Chloride 60 meq Potassium   





    Phosphate 30 mmol   





    Magnesium Sulfate gm 0.6   





    gm Calcium Gluconate 1 gm   





    In Amino Acid 5%-D15w 1,   





    000 ml @ 55 mls/hr IV .   





    [id number] [facility] Rx#:[id number]   


 


    Sodium Chloride 0.45% 1, 240 240 160





    000 ml @ 20 mls/hr IV .   





    Q24H [facility] Rx#:[id number]   


 


    Sodium Chloride 0.9% 1,00   5





    mL   


 


    metroNIDAZOLE-NS  200 100 100





    mg   


 


  Intake, IV Titration  1062.2 





  Amount   


 


    Mvi, Adult No.4 with Vit  1062.2 





    K 10 ml Trace (Conc-1Ml/   





    Dose) 1 ml Potassium   





    Chloride 60 meq Potassium   





    Phosphate 30 mmol   





    Magnesium Sulfate gm 0.6   





    gm Calcium Gluconate 1 gm   





    In Amino Acid 5%-D15w 1,   





    000 ml @ 55 mls/hr IV .   





    [id number] [facility] Rx#:[id number]   


 


  Oral  250 


 


Output:   


 


  Drainage 40  


 


    Right Lower Abdomen 40  


 


  Urine 1575 1450 1200


 


  Stool 200 850 100


 


Other:   


 


  Voiding Method Indwelling Catheter Indwelling Catheter Indwelling Catheter








                       ABP, PAP, CO, CI - Last Documented











Arterial Blood Pressure        89/47

















- Labs


CBC & Chem 7: 


                                 06/25/20 05:25





                                 06/25/20 05:25


Labs: 


                  Abnormal Lab Results - Last 24 Hours (Table)











  06/25/20 06/25/20 06/25/20 Range/Units





  05:25 05:25 17:33 


 


WBC   13.7 H   (3.8-10.6)  k/uL


 


RBC   3.79 L   (3.80-5.40)  m/uL


 


RDW   17.0 H   (11.5-15.5)  %


 


Neutrophils #   11.6 H   (1.3-7.7)  k/uL


 


Sodium  133 L    (137-145)  mmol/L


 


Creatinine  0.34 L    (0.52-1.04)  mg/dL


 


Glucose  114 H    (74-99)  mg/dL


 


POC Glucose (mg/dL)    101 H  (75-99)  mg/dL


 


Calcium  8.1 L    (8.4-10.2)  mg/dL








                      Microbiology - Last 24 Hours (Table)











 06/24/20 13:50 Blood Culture - Preliminary





 Blood    No Growth after 24 hours


 


 06/19/20 05:10 Blood Culture - Final





 Blood    No Growth after 144 hours














Assessment and Plan


(1) Peritonitis (acute) generalized


Current Visit: Yes   Status: Acute   Code(s): K65.0 - GENERALIZED (ACUTE) 

PERITONITIS   SNOMED Code(s): 36234671


   





(2) History of gastric bypass


Current Visit: Yes   Status: Acute   Code(s): Z98.84 - BARIATRIC SURGERY STATUS 

 SNOMED Code(s): 700133383


   





(3) Medical non-compliance


Current Visit: Yes   Status: Acute   Code(s): Z91.19 - PATIENT'S NONCOMPLIANCE W

OTH MEDICAL TREATMENT AND REGIMEN   SNOMED Code(s): 670849672


   





(4) Tobacco abuse


Current Visit: Yes   Status: Acute   Code(s): Z72.0 - TOBACCO USE   SNOMED 

Code(s): 083362974


   





(5) Tobacco abuse counseling


Current Visit: Yes   Status: Acute   Code(s): Z71.6 - TOBACCO ABUSE COUNSELING  

SNOMED Code(s): 478464800


   





(6) Depressive disorder


Current Visit: Yes   Status: Acute   Code(s): F32.9 - MAJOR DEPRESSIVE DISORDER,

SINGLE EPISODE, UNSPECIFIED   SNOMED Code(s): 09820652


   





(7) Hypothyroidism


Current Visit: Yes   Status: Acute   Code(s): E03.9 - HYPOTHYROIDISM, 

UNSPECIFIED   SNOMED Code(s): 37253225


   





(8) Abdominal pain


Current Visit: Yes   Status: Acute   Code(s): R10.9 - UNSPECIFIED ABDOMINAL PAIN

  SNOMED Code(s): 47864209


   





(9) Free intraperitoneal air


Current Visit: Yes   Status: Acute   Code(s): K66.8 - OTHER SPECIFIED DISORDERS 

OF PERITONEUM   SNOMED Code(s): 97792605


   





(10) Dehydration


Current Visit: Yes   Status: Acute   Code(s): E86.0 - DEHYDRATION   SNOMED 

Code(s): 48257471

## 2020-06-17 NOTE — PN
PROGRESS NOTE



Patient is seen for followup for acute kidney injury.  Patient was started on Lasix

drip yesterday for oliguria.  Her urine output picked up and she started to have urine

output of 300-400 mL an hour.  Patient did get extubated.  However, she is confused.

She was noted to be quite hypokalemic and the Lasix drip has been discontinued

appropriately.  Chest x-ray did not show increased pulmonary vasculature.  This morning

patient is comfortable.  She is confused.  She is off of Levophed.



Blood pressure was 124/92, heart rate 103 per minute, she is afebrile.

Examination of the heart S1, S2.  Examination of the lungs, decreased breath sounds at

bases, abdomen is soft, nontender.  Examination of the lower extremities shows no

significant edema. CNS exam shows patient is moving all 4 extremities, but she is

confused.



LABS:

Show sodium 140, potassium 3.4, chloride 112, CO2 is 23. BUN 34, serum creatinine 1.39.



ASSESSMENT:

1. Acute kidney injury secondary to hypotension, currently improving.

2. Oliguria associated with acute kidney injury, now resolved.

3. Hypokalemia secondary to diuretics, status post replacement.

4. Acute abdomen with perforated bowel, status post explorative laparotomy, sigmoid

    colectomy and colostomy.

5. Hypoxic acute respiratory failure, status post extubation.

6. Sepsis related to intraabdominal source and bowel perforation with fecal

    peritonitis.

7. Metabolic acidosis, now resolved.



PLAN:

Hold off on IV Lasix as urine output is maintained at 400 to 500 mL an hour.  Continue

to replace potassium aggressively.  Repeat labs in a.m. and continue to avoid

nephrotoxic medications.





MMODL / IJN: [id number] / Job#: [id number]

## 2020-06-18 LAB
ALBUMIN SERPL-MCNC: 2 G/DL (ref 3.5–5)
ALBUMIN SERPL-MCNC: 2 G/DL (ref 3.5–5)
ALP SERPL-CCNC: 64 U/L (ref 38–126)
ALT SERPL-CCNC: 31 U/L (ref 4–34)
ANION GAP SERPL CALC-SCNC: 1 MMOL/L
ANION GAP SERPL CALC-SCNC: 2 MMOL/L
AST SERPL-CCNC: 81 U/L (ref 14–36)
BASOPHILS # BLD AUTO: 0 K/UL (ref 0–0.2)
BASOPHILS NFR BLD AUTO: 0 %
BUN SERPL-SCNC: 20 MG/DL (ref 7–17)
BUN SERPL-SCNC: 22 MG/DL (ref 7–17)
CALCIUM SPEC-MCNC: 7.5 MG/DL (ref 8.4–10.2)
CALCIUM SPEC-MCNC: 7.5 MG/DL (ref 8.4–10.2)
CHLORIDE SERPL-SCNC: 112 MMOL/L (ref 98–107)
CHLORIDE SERPL-SCNC: 113 MMOL/L (ref 98–107)
CO2 SERPL-SCNC: 27 MMOL/L (ref 22–30)
CO2 SERPL-SCNC: 28 MMOL/L (ref 22–30)
EOSINOPHIL # BLD AUTO: 0.1 K/UL (ref 0–0.7)
EOSINOPHIL NFR BLD AUTO: 1 %
ERYTHROCYTE [DISTWIDTH] IN BLOOD BY AUTOMATED COUNT: 3.6 M/UL (ref 3.8–5.4)
ERYTHROCYTE [DISTWIDTH] IN BLOOD: 17 % (ref 11.5–15.5)
FERRITIN SERPL-MCNC: 180.3 NG/ML (ref 10–291)
GLUCOSE BLD-MCNC: 117 MG/DL (ref 75–99)
GLUCOSE BLD-MCNC: 121 MG/DL (ref 75–99)
GLUCOSE BLD-MCNC: 124 MG/DL (ref 75–99)
GLUCOSE SERPL-MCNC: 106 MG/DL (ref 74–99)
GLUCOSE SERPL-MCNC: 116 MG/DL (ref 74–99)
HBA1C MFR BLD: 6.7 % (ref 4–6)
HCT VFR BLD AUTO: 34.3 % (ref 34–46)
HGB BLD-MCNC: 11.2 GM/DL (ref 11.4–16)
IRON SERPL-MCNC: 7 UG/DL (ref 50–170)
LYMPHOCYTES # SPEC AUTO: 0.7 K/UL (ref 1–4.8)
LYMPHOCYTES NFR SPEC AUTO: 9 %
MAGNESIUM SPEC-SCNC: 2.4 MG/DL (ref 1.6–2.3)
MCH RBC QN AUTO: 31.2 PG (ref 25–35)
MCHC RBC AUTO-ENTMCNC: 32.8 G/DL (ref 31–37)
MCV RBC AUTO: 95.1 FL (ref 80–100)
MONOCYTES # BLD AUTO: 0.2 K/UL (ref 0–1)
MONOCYTES NFR BLD AUTO: 2 %
NEUTROPHILS # BLD AUTO: 6.8 K/UL (ref 1.3–7.7)
NEUTROPHILS NFR BLD AUTO: 87 %
PLATELET # BLD AUTO: 178 K/UL (ref 150–450)
POTASSIUM SERPL-SCNC: 2.4 MMOL/L (ref 3.5–5.1)
POTASSIUM SERPL-SCNC: 3.2 MMOL/L (ref 3.5–5.1)
POTASSIUM SERPL-SCNC: 3.3 MMOL/L (ref 3.5–5.1)
PROT SERPL-MCNC: 4.1 G/DL (ref 6.3–8.2)
SODIUM SERPL-SCNC: 140 MMOL/L (ref 137–145)
SODIUM SERPL-SCNC: 143 MMOL/L (ref 137–145)
T4 FREE SERPL-MCNC: 0.57 NG/DL (ref 0.78–2.19)
TIBC SERPL-MCNC: 234 UG/DL (ref 228–460)
VIT B12 SERPL-MCNC: 1457 PG/ML (ref 200–944)
WBC # BLD AUTO: 7.8 K/UL (ref 3.8–10.6)

## 2020-06-18 PROCEDURE — 3E0336Z INTRODUCTION OF NUTRITIONAL SUBSTANCE INTO PERIPHERAL VEIN, PERCUTANEOUS APPROACH: ICD-10-PCS

## 2020-06-18 RX ADMIN — SODIUM BICARBONATE SCH MLS/HR: 84 INJECTION, SOLUTION INTRAVENOUS at 03:45

## 2020-06-18 RX ADMIN — HYDROMORPHONE HYDROCHLORIDE PRN MG: 1 INJECTION, SOLUTION INTRAMUSCULAR; INTRAVENOUS; SUBCUTANEOUS at 23:43

## 2020-06-18 RX ADMIN — PANTOPRAZOLE SODIUM SCH MG: 40 INJECTION, POWDER, FOR SOLUTION INTRAVENOUS at 09:29

## 2020-06-18 RX ADMIN — HEPARIN SODIUM SCH UNIT: 5000 INJECTION, SOLUTION INTRAVENOUS; SUBCUTANEOUS at 20:04

## 2020-06-18 RX ADMIN — LEUCINE, PHENYLALANINE, LYSINE, METHIONINE, ISOLEUCINE, VALINE, HISTIDINE, THREONINE, TRYPTOPHAN, ALANINE, GLYCINE, ARGININE, PROLINE, SERINE, TYROSINE, DEXTROSE SCH MLS/HR: 365; 280; 290; 200; 300; 290; 240; 210; 90; 1035; 515; 575; 340; 250; 20; 15 INJECTION INTRAVENOUS at 10:26

## 2020-06-18 RX ADMIN — METRONIDAZOLE SCH MLS/HR: 500 INJECTION, SOLUTION INTRAVENOUS at 23:46

## 2020-06-18 RX ADMIN — METRONIDAZOLE SCH MLS/HR: 500 INJECTION, SOLUTION INTRAVENOUS at 18:22

## 2020-06-18 RX ADMIN — SODIUM BICARBONATE SCH MLS/HR: 84 INJECTION, SOLUTION INTRAVENOUS at 13:39

## 2020-06-18 RX ADMIN — POTASSIUM CHLORIDE SCH MLS/HR: 14.9 INJECTION, SOLUTION INTRAVENOUS at 22:46

## 2020-06-18 RX ADMIN — METRONIDAZOLE SCH MLS/HR: 500 INJECTION, SOLUTION INTRAVENOUS at 05:06

## 2020-06-18 RX ADMIN — POTASSIUM CHLORIDE SCH MLS/HR: 14.9 INJECTION, SOLUTION INTRAVENOUS at 18:32

## 2020-06-18 RX ADMIN — SPIRONOLACTONE SCH MG: 25 TABLET, FILM COATED ORAL at 09:36

## 2020-06-18 RX ADMIN — PIPERACILLIN AND TAZOBACTAM SCH MLS/HR: 3; .375 INJECTION, POWDER, FOR SOLUTION INTRAVENOUS at 13:35

## 2020-06-18 RX ADMIN — FUROSEMIDE SCH: 10 INJECTION, SOLUTION INTRAMUSCULAR; INTRAVENOUS at 08:22

## 2020-06-18 RX ADMIN — LEUCINE, PHENYLALANINE, LYSINE, METHIONINE, ISOLEUCINE, VALINE, HISTIDINE, THREONINE, TRYPTOPHAN, ALANINE, GLYCINE, ARGININE, PROLINE, SERINE, TYROSINE, DEXTROSE SCH MLS/HR: 365; 280; 290; 200; 300; 290; 240; 210; 90; 1035; 515; 575; 340; 250; 20; 15 INJECTION INTRAVENOUS at 23:47

## 2020-06-18 RX ADMIN — POTASSIUM CHLORIDE SCH MLS/HR: 14.9 INJECTION, SOLUTION INTRAVENOUS at 16:52

## 2020-06-18 RX ADMIN — LEUCINE, PHENYLALANINE, LYSINE, METHIONINE, ISOLEUCINE, VALINE, HISTIDINE, THREONINE, TRYPTOPHAN, ALANINE, GLYCINE, ARGININE, PROLINE, SERINE, TYROSINE, DEXTROSE SCH MLS/HR: 365; 280; 290; 200; 300; 290; 240; 210; 90; 1035; 515; 575; 340; 250; 20; 15 INJECTION INTRAVENOUS at 06:03

## 2020-06-18 RX ADMIN — HEPARIN SODIUM SCH UNIT: 5000 INJECTION, SOLUTION INTRAVENOUS; SUBCUTANEOUS at 09:30

## 2020-06-18 RX ADMIN — IPRATROPIUM BROMIDE AND ALBUTEROL SULFATE SCH ML: .5; 3 SOLUTION RESPIRATORY (INHALATION) at 07:31

## 2020-06-18 RX ADMIN — METRONIDAZOLE SCH MLS/HR: 500 INJECTION, SOLUTION INTRAVENOUS at 12:10

## 2020-06-18 RX ADMIN — LEUCINE, PHENYLALANINE, LYSINE, METHIONINE, ISOLEUCINE, VALINE, HISTIDINE, THREONINE, TRYPTOPHAN, ALANINE, GLYCINE, ARGININE, PROLINE, SERINE, TYROSINE, DEXTROSE SCH MLS/HR: 365; 280; 290; 200; 300; 290; 240; 210; 90; 1035; 515; 575; 340; 250; 20; 15 INJECTION INTRAVENOUS at 17:23

## 2020-06-18 RX ADMIN — ACETAMINOPHEN PRN MLS/HR: 10 INJECTION, SOLUTION INTRAVENOUS at 22:54

## 2020-06-18 RX ADMIN — HYDROMORPHONE HYDROCHLORIDE PRN MG: 1 INJECTION, SOLUTION INTRAMUSCULAR; INTRAVENOUS; SUBCUTANEOUS at 18:38

## 2020-06-18 RX ADMIN — POTASSIUM CHLORIDE SCH MLS/HR: 14.9 INJECTION, SOLUTION INTRAVENOUS at 05:26

## 2020-06-18 RX ADMIN — LEUCINE, PHENYLALANINE, LYSINE, METHIONINE, ISOLEUCINE, VALINE, HISTIDINE, THREONINE, TRYPTOPHAN, ALANINE, GLYCINE, ARGININE, PROLINE, SERINE, TYROSINE, DEXTROSE SCH MLS/HR: 365; 280; 290; 200; 300; 290; 240; 210; 90; 1035; 515; 575; 340; 250; 20; 15 INJECTION INTRAVENOUS at 12:15

## 2020-06-18 RX ADMIN — POTASSIUM CHLORIDE SCH MLS/HR: 14.9 INJECTION, SOLUTION INTRAVENOUS at 08:27

## 2020-06-18 RX ADMIN — HYDROMORPHONE HYDROCHLORIDE PRN MG: 1 INJECTION, SOLUTION INTRAMUSCULAR; INTRAVENOUS; SUBCUTANEOUS at 10:46

## 2020-06-18 RX ADMIN — POTASSIUM CHLORIDE SCH MLS/HR: 14.9 INJECTION, SOLUTION INTRAVENOUS at 07:07

## 2020-06-18 RX ADMIN — METOPROLOL TARTRATE SCH MG: 1 INJECTION, SOLUTION INTRAVENOUS at 20:04

## 2020-06-18 RX ADMIN — METOPROLOL TARTRATE SCH MG: 1 INJECTION, SOLUTION INTRAVENOUS at 09:27

## 2020-06-18 RX ADMIN — IPRATROPIUM BROMIDE AND ALBUTEROL SULFATE SCH: .5; 3 SOLUTION RESPIRATORY (INHALATION) at 03:27

## 2020-06-18 RX ADMIN — PIPERACILLIN AND TAZOBACTAM SCH MLS/HR: 3; .375 INJECTION, POWDER, FOR SOLUTION INTRAVENOUS at 19:57

## 2020-06-18 RX ADMIN — METRONIDAZOLE SCH MLS/HR: 500 INJECTION, SOLUTION INTRAVENOUS at 00:56

## 2020-06-18 RX ADMIN — PIPERACILLIN AND TAZOBACTAM SCH MLS/HR: 3; .375 INJECTION, POWDER, FOR SOLUTION INTRAVENOUS at 03:37

## 2020-06-18 RX ADMIN — HYDROMORPHONE HYDROCHLORIDE PRN MG: 1 INJECTION, SOLUTION INTRAMUSCULAR; INTRAVENOUS; SUBCUTANEOUS at 03:37

## 2020-06-18 NOTE — PN
PROGRESS NOTE



PULMONARY/CRITICAL CARE PROGRESS NOTE:



DATE OF SERVICE:

06/18/2020



This is a 59-year-old female seen 3 days ago in consultation.  She came to the

emergency room on June 14, 2020 for abdominal pain.  She is postop day #4, status post

exploratory laparotomy, sigmoid colectomy, descending colostomy with Roselia's

procedure, disimpaction of the descending colon, open appendectomy, abdominal washout,

and placement of a right lower abdominal Jimenez-Rincon drain and incisional wound VAC

system.  Overall, the patient is doing reasonably well.  She still is a bit confused

and disoriented.  Her mental status since she was extubated yesterday has improved a

bit.  She was seen by Neurology yesterday.  Currently, she is on 3 L.  She is getting

half-normal saline at 100 mL an hour.  She is getting TPN at 30 mL an hour and lipids.

I told the nurse she could try a bit of Haldol on her.  She did get some Dilaudid

yesterday and fell asleep after the Dilaudid.  Again, she is only oriented to person.

The nurse who has her commonality says that overall, her mental status has improved

since yesterday.



Current vital signs are reviewed, temperature is 97.5, heart rate 97, respiratory rate

22, blood pressure 115/80 mean 91, saturations are 95% on 3 L.  Appears in no acute

distress.

HEENT: Examination is grossly unremarkable.  Nasal O2 is in place.

NECK:  Supple, full range of motion.  No adenopathy or thyromegaly.  Neck veins are

flat.

CARDIOVASCULAR: Examination reveals regular rhythm and rate.  Heart rate 85 beats per

minute.  S1, S2 normal.

LUNGS: Relatively clear.  A few scattered rhonchi.  No wheezes.

ABDOMEN:  Soft.  No bowel sounds.

EXTREMITIES are intact.  No edema.

SKIN: Without rash.

NEUROLOGIC: Neurologic examination it is interesting and that she does move all 4

extremities.  She is a bit confused and disoriented.  She does answer some basic

questions like to we know what her name is and she is sort of knows what year it is.

Could not really answer me as regards to where she was or who the president was.

Janie, her nurse states that her overall mental status still has improved.



LABS:

Reviewed.  White count 148, lab set of labs are reviewed.  Sodium 140, potassium 2.4,

chloride is 112, CO2 is 27, anion gap is 1. BUN and creatinine were 22 and 0.6.  Her

phosphorus is 0.7, albumin is 2.0.



Microbiology is all negative including wound Gram stain and culture.  Urine and blood

cultures.



Chest x-ray is reviewed.



Neurology consult appreciated.



Current medications are reviewed.



ASSESSMENT:

1. Postoperative day #4, status post exploratory laparotomy with sigmoid colectomy for

    perforated sigmoid colon, Roselia's procedure, disimpaction of descending colon,

    open appendectomy, abdominal washout for fecal peritonitis, placement of a Jimenez-

    Rincon drain, and application of incisional wound VAC system.

2. History of hypothyroidism.

3. History of ongoing tobacco use and nicotine addiction.

4. Status post extubation.

5. Mental status changes, likely related to encephalopathy.



PLAN:

I did tell the nurse to go careful on the Dilaudid.  We do not want her to end up back

on the mechanical ventilator.  If we can help it.  In addition, we did use some Haldol

2 to 4 mg IV push.  Additional recommendations and suggestions are forthcoming.  Labs,

medications, and other data is all reviewed.  No additional recommendations are made.

Prognosis is guarded.

.





MMODL / IJN: [id number] / Job#: [id number]

## 2020-06-18 NOTE — PN
PROGRESS NOTE



Patient is seen for followup for acute kidney injury.  Renal function has improved.

The patient was on Lasix drip which was discontinued.  Her creatinine is 0.5 mg/dL.

She remains severely hypokalemic.  Phosphorus was low as well at 0.7, which is being

replaced.  Patient remains confused, although mentation is somewhat improved.



PHYSICAL EXAMINATION:

On examination today, blood pressure was 115/80, heart rate 97 per minute, she is

afebrile.

Examination of the heart S1, S2.  Examination of the lungs, decreased breath sounds at

bases.  Abdomen is soft, nontender.  Examination of the lower extremities shows no

significant edema. CNS exam shows patient is moving all 4 extremities, although she is

confused.



LABS:

Show sodium 143, potassium 3.2, chloride 113, BUN 20, creatinine 0.5, phosphorus was

0.7.



ASSESSMENT:

1. Acute kidney injury, ATN currently resolved.

2. Hypokalemia secondary to diuresis, being replaced.  Magnesium is not low.  Continue

    to replace aggressively.

3. Hypophosphatemia being replaced.

4. Status post explorative laparotomy for acute abdomen and perforated bowel, status

    post sigmoid colectomy and colostomy.



PLAN:

Hold off on the Lasix.  Continue IV fluids.  Replace potassium and phosphorus.





MMODL / IJN: [id number] / Job#: [id number]

## 2020-06-18 NOTE — P.PN
Progress Note - Text


Progress Note Date: 06/18/20





Spoke to Cornel, significant other. Son to come in to see mother. Rehab and home 

healthcare described. Family wants [facility] in [place]. Recommend bariatric

assessment with bariatric labs and dietitian.

## 2020-06-18 NOTE — PN
PROGRESS NOTE



Mrs. Carter is a 59-year-old female who presents with a ruptured viscus, underwent

surgery by Dr. Reed.  She has respiratory failure, extubated yesterday.  She is

awake, but confused.  Hemodynamically, she is on no vasopressor.  Her heart rate is

stable.  She has continued to be in sinus mechanism.  She underwent an echocardiogram

that showed a preserved systolic function with mild mitral and tricuspid regurgitation.



She continues to be at this time on Lasix 40 mg IV daily, potassium and spironolactone

25 mg daily.



PHYSICAL EXAMINATION:

Blood pressure 126/80 with a heart rate in the 80s to low 100s.

LUNGS:  Clear to auscultation anteriorly.

HEART:  Regular rate and rhythm. S1, S2.  No S3.  No rub.

ABDOMEN:  Soft.  Dressing in place.  No output from the colostomy.

EXTREMITIES:  No edema.  She is awake, but confused.



LAB DATA:

Revealed potassium 2.4. BUN and creatinine 22 and 0.6.



IMPRESSION:

1. Status post abdominal surgery with Roselia's procedure for ruptured viscus.

2. Metabolic encephalopathy.

3. Sinus tachycardia, improving.

4. Prior history of smoking.

5. Hypothyroidism.



RECOMMENDATION:

Will continue the patient on the IV metoprolol at this time. When she is able to take

oral, that will be switched to oral.  Otherwise, will see her on as-needed basis.

Please feel free to call us for any question.





MMODL / IJN: [id number] / Job#: [id number]

## 2020-06-18 NOTE — PN
PROGRESS NOTE



DATE OF SERVICE:

06/18/2020



REASON FOR FOLLOWUP:

Secondary peritonitis from perforated sigmoid diverticulitis.



INTERVAL HISTORY:

The patient did have a low-grade fever of 99.7 this evening.  The patient has been

afebrile since then.  The patient remains pleasantly confused, though slightly more

awake per the RN this afternoon.  The patient is unable to provide any history.  No

vomiting or any diarrhea has been reported.



PHYSICAL EXAMINATION:

Blood pressure 135/94 with a pulse of 107, temperature 98.8. She is 97% on 4 L nasal

cannula.

General description is a middle-aged female lying in bed in no distress.

RESPIRATORY SYSTEM: Unlabored breathing. Clear to auscultation anteriorly.

HEART: S1, S2.  Regular rate and rhythm.

ABDOMEN: Soft. Mildly distended. No guarding or rigidity.



LABS:

Hemoglobin is 11.2, white count 7.8.  BUN is 20, creatinine 0.51.  Abdominal culture

has been positive for anaerobe Gram-negative bacilli and clostridium species.



DIAGNOSTIC IMPRESSION AND PLAN:

Patient with secondary peritonitis from perforated sigmoid diverticulitis.  Abdominal

culture positive for anaerobes and clostridium species. She is currently covered with

Zosyn. White count has normalized. To continue while waiting for the culture to

finalize and monitor her clinical course closely.





MMODL / IJN: [id number] / Job#: [id number]

## 2020-06-18 NOTE — P.PN
<LeoUyenRoula A - Last Filed: 06/18/20 11:14>





Subjective


Progress Note Date: 06/18/20





CHIEF COMPLAINT: Abdominal pain





HISTORY OF PRESENT ILLNESS: 59-year-old female who underwent exploratory 

laparotomy with sigmoid colectomy, descending colostomy creation, and 

appendectomy with Dr. Reed on June 14, 2020. Patient examined this morning 

in the ICU. Patients mental status appears to be slightly improved from 

yesterday. TPN infusing. Ostomy without stool output. Calder[place] with good urine 

output. WBC 7.8. Hemoglobin 11.2. Potassium 3.2.





PHYSICAL EXAM: 


VITAL SIGNS: Reviewed


GENERAL: Well-developed in no acute distress


HEENT:  No sclera icterus. Extraocular movements grossly intact.  Moist buccal 

mucosa. 


Head is atraumatic, normocephalic. No nasal drainage.


NECK:  Supple without lymphadenopathy.


CHEST:  Non-labored respirations and equal bilateral excursions


CARDIOVASCULAR:  Tachycardic.  Regular rate with regular rhythm.  Palpable 2+ 

radial pulses.


ABDOMEN:  Soft.  Nondistended. PREVENA wound vac noted. Ostomy to left side of 

abdomen. No stool or gas noted. Stoma pink. TERESSA drain with serous drainage.


MUSCULOSKELETAL:  No clubbing or cyanosis.


NEUROLOGIC:  Awake and alert. Restless. 


PSYCH:  Unable to assess secondary to altered mental status


SKIN: Well perfused.  Good skin turgor. 





ASSESSMENT: 


1.  Abdominal pain secondary to ruptured sigmoid colon, fecal peritonitis, 

descending and sigmoid colon impaction, appendicolith with appendicitis





PLAN: 


Continue TPN


RN to perform swallow study. If patient passes, may begin clear liquid diet. 

Bariatric dietitian on consult


Monitor TERESSA drain


Await ostomy function


Continue antibiotics. Monitor labs


Neurology on consult. Appreciate recommendations. 


Obtain full set of bariatric labs to rule out nutritional deficiency as etiology

of altered mental status


Further ICU management per Dr. Castaneda.





Nurse practitioner note has been reviewed by physician. Signing provider agrees 

with the documented findings, assessment, and plan of care. 








Objective





- Vital Signs


Vital signs: 


                                   Vital Signs











Temp  97.9 F   06/18/20 09:00


 


Pulse  99   06/18/20 09:00


 


Resp  24   06/18/20 09:00


 


BP  115/80   06/18/20 08:00


 


Pulse Ox  94 L  06/18/20 09:00








                                 Intake & Output











 06/17/20 06/18/20 06/18/20





 18:59 06:59 18:59


 


Intake Total 2135 1501 610


 


Output Total 2750 1290 550


 


Balance -615 211 60


 


Weight 66.2 kg  


 


Intake:   


 


  IV 1580 1400 400


 


    Acetaminophen 1000mg 100  


 


    Pipercillin/ Tazobactam 3 100 100 





    .375 g   


 


    Sodium Chloride 0.45% 1, 1180 1200 400





    000 ml @ 100 mls/hr IV .   





    [facility][id number]   


 


    metroNIDAZOLE-NS  200 100 





    mg   


 


  Intake, IV Titration 555 101 210





  Amount   


 


    Fat Emulsion 20% 250 ml @ 105 21 





    20.833 mls/hr IV DAILY@   





    [facility] Rx[facility][id number]   


 


    Potassium Chloride 20 meq   150





    In Water For Injection 1   





    100ml.bag @ 50 mls/hr   





    IVPB ONCE ONE Rx#:   





    [id number]   


 


    Potassium Chloride 20 meq 300 50 





    In Water For Injection 1   





    100ml.bag @ 50 mls/hr   





    IVPB [facility]2H [facility] Rx#:   





    [id number]   


 


    Sodium Acetate 30 meq 150 30 60





    Potassium Chloride 20 meq   





    Potassium Acetate 20 meq   





    Calcium Gluconate 1 gm   





    Mvi, Adult No.4 with Vit   





    K 10 ml Trace (Conc-1Ml/   





    Dose) 1 ml In Amino Acid   





    5%-D15w 1,000 ml @ 30 mls   





    /hr IV .Q24H ONE Rx#:   





    [id number]   


 


Output:   


 


  Drainage  30 25


 


    Right Lower Abdomen  30 25


 


  Urine 2750 1260 525


 


Other:   


 


  Voiding Method Indwelling Catheter Indwelling Catheter 








                       ABP, PAP, CO, CI - Last Documented











Arterial Blood Pressure        125/119

















- Labs


CBC & Chem 7: 


                                 06/18/20 09:10





                                 06/18/20 09:10


Labs: 


                  Abnormal Lab Results - Last 24 Hours (Table)











  06/17/20 06/17/20 06/17/20 Range/Units





  10:18 11:13 11:15 


 


RBC     (3.80-5.40)  m/uL


 


Hgb     (11.4-16.0)  gm/dL


 


RDW     (11.5-15.5)  %


 


Lymphocytes #     (1.0-4.8)  k/uL


 


Potassium    3.1 L  (3.5-5.1)  mmol/L


 


Chloride     ()  mmol/L


 


BUN     (7-17)  mg/dL


 


Creatinine     (0.52-1.04)  mg/dL


 


Glucose     (74-99)  mg/dL


 


POC Glucose (mg/dL)   141 H   (75-99)  mg/dL


 


Calcium     (8.4-10.2)  mg/dL


 


Ionized Calcium Mary    4.4 L  (4.5-5.3)  mg/dL


 


Phosphorus     (2.5-4.5)  mg/dL


 


Magnesium     (1.6-2.3)  mg/dL


 


AST     (14-36)  U/L


 


Total Protein     (6.3-8.2)  g/dL


 


Albumin     (3.5-5.0)  g/dL


 


Triglycerides  740 H    (<150)  mg/dL














  06/17/20 06/17/20 06/17/20 Range/Units





  16:43 18:13 23:52 


 


RBC     (3.80-5.40)  m/uL


 


Hgb     (11.4-16.0)  gm/dL


 


RDW     (11.5-15.5)  %


 


Lymphocytes #     (1.0-4.8)  k/uL


 


Potassium  2.9 L    (3.5-5.1)  mmol/L


 


Chloride     ()  mmol/L


 


BUN     (7-17)  mg/dL


 


Creatinine     (0.52-1.04)  mg/dL


 


Glucose     (74-99)  mg/dL


 


POC Glucose (mg/dL)   133 H  130 H  (75-99)  mg/dL


 


Calcium     (8.4-10.2)  mg/dL


 


Ionized Calcium Mary     (4.5-5.3)  mg/dL


 


Phosphorus     (2.5-4.5)  mg/dL


 


Magnesium     (1.6-2.3)  mg/dL


 


AST     (14-36)  U/L


 


Total Protein     (6.3-8.2)  g/dL


 


Albumin     (3.5-5.0)  g/dL


 


Triglycerides     (<150)  mg/dL














  06/18/20 06/18/20 06/18/20 Range/Units





  04:40 05:29 09:10 


 


RBC     (3.80-5.40)  m/uL


 


Hgb     (11.4-16.0)  gm/dL


 


RDW     (11.5-15.5)  %


 


Lymphocytes #     (1.0-4.8)  k/uL


 


Potassium  2.4 L*   3.2 L  (3.5-5.1)  mmol/L


 


Chloride  112 H   113 H  ()  mmol/L


 


BUN  22 H   20 H  (7-17)  mg/dL


 


Creatinine    0.51 L  (0.52-1.04)  mg/dL


 


Glucose  116 H   106 H  (74-99)  mg/dL


 


POC Glucose (mg/dL)   117 H   (75-99)  mg/dL


 


Calcium  7.5 L   7.5 L  (8.4-10.2)  mg/dL


 


Ionized Calcium Mary     (4.5-5.3)  mg/dL


 


Phosphorus  0.7 L*    (2.5-4.5)  mg/dL


 


Magnesium  2.4 H    (1.6-2.3)  mg/dL


 


AST    81 H  (14-36)  U/L


 


Total Protein    4.1 L  (6.3-8.2)  g/dL


 


Albumin  2.0 L   2.0 L  (3.5-5.0)  g/dL


 


Triglycerides     (<150)  mg/dL














  06/18/20 Range/Units





  09:10 


 


RBC  3.60 L  (3.80-5.40)  m/uL


 


Hgb  11.2 L  (11.4-16.0)  gm/dL


 


RDW  17.0 H  (11.5-15.5)  %


 


Lymphocytes #  0.7 L  (1.0-4.8)  k/uL


 


Potassium   (3.5-5.1)  mmol/L


 


Chloride   ()  mmol/L


 


BUN   (7-17)  mg/dL


 


Creatinine   (0.52-1.04)  mg/dL


 


Glucose   (74-99)  mg/dL


 


POC Glucose (mg/dL)   (75-99)  mg/dL


 


Calcium   (8.4-10.2)  mg/dL


 


Ionized Calcium Mary   (4.5-5.3)  mg/dL


 


Phosphorus   (2.5-4.5)  mg/dL


 


Magnesium   (1.6-2.3)  mg/dL


 


AST   (14-36)  U/L


 


Total Protein   (6.3-8.2)  g/dL


 


Albumin   (3.5-5.0)  g/dL


 


Triglycerides   (<150)  mg/dL








                      Microbiology - Last 24 Hours (Table)











 06/15/20 16:30 Blood Culture - Preliminary





 Blood    No Growth after 48 hours


 


 06/15/20 16:25 Blood Culture - Preliminary





 Blood    No Growth after 48 hours


 


 06/14/20 23:00 Gram Stain - Final





 Abdomen Wound Culture - Final


 


 06/14/20 23:00 Gram Stain - Final





 Abdomen Wound Culture - Final


 


 06/14/20 23:54 Gram Stain - Final





 Sputum Sputum Culture - Final














<Brianna,Socorro N - Last Filed: 06/26/20 03:03>





Subjective





Patient seen and evaluated with  nurse practitioner.  Additional findings 

include need for enteral nutrition.  Agree with swallow study with direction of 

nutrition with bariatric dietitian consult. 





Objective





- Vital Signs


Vital signs: 


                                   Vital Signs











Temp  98.1 F   06/26/20 00:00


 


Pulse  79   06/26/20 02:00


 


Resp  22   06/26/20 02:00


 


BP  95/74   06/25/20 20:00


 


Pulse Ox  96   06/26/20 02:00








                                 Intake & Output











 06/25/20 06/25/20 06/26/20





 06:59 18:59 06:59


 


Intake Total 1300 2538.2 876


 


Output Total 1815 2300 1300


 


Balance -515 238.2 -424


 


Weight 79 kg  


 


Intake:   


 


  IV 1300 1226 876


 


    Ampicillin-Sulbactam 3 gm 200 100 100





    In Sodium Chloride 0.9%   





    100 ml @ 200 mls/hr IVPB   





    Q6HR [facility] Rx#:[id number]   


 


    Fat Emulsion 20% 250 mL@  126 126





    20.833mls/hr   


 


    Mvi, Adult No.4 with Vit 660 660 





    K 10 ml Trace (Conc-1Ml/   





    Dose) 1 ml Potassium   





    Chloride 60 meq Potassium   





    Phosphate 30 mmol   





    Magnesium Sulfate gm 0.6   





    gm Calcium Gluconate 1 gm   





    In Amino Acid 5%-D15w 1,   





    000 ml @ 55 mls/hr IV .   





    [id number] [facility] Rx#:[id number]   


 


    Mvi, Adult No.4 with Vit   385





    K 10 ml Trace (Conc-1Ml/   





    Dose) 1 ml Potassium   





    Chloride 60 meq Potassium   





    Phosphate 30 mmol   





    Magnesium Sulfate gm 0.6   





    gm Calcium Gluconate 1 gm   





    In Amino Acid 5%-D15w 1,   





    000 ml @ 55 mls/hr IV .   





    [id number] [facility] Rx#:[id number]   


 


    Sodium Chloride 0.45% 1, 240 240 160





    000 ml @ 20 mls/hr IV .   





    Q24H [facility] Rx#:[id number]   


 


    Sodium Chloride 0.9% 1,00   5





    mL   


 


    metroNIDAZOLE-NS  200 100 100





    mg   


 


  Intake, IV Titration  1062.2 





  Amount   


 


    Mvi, Adult No.4 with Vit  1062.2 





    K 10 ml Trace (Conc-1Ml/   





    Dose) 1 ml Potassium   





    Chloride 60 meq Potassium   





    Phosphate 30 mmol   





    Magnesium Sulfate gm 0.6   





    gm Calcium Gluconate 1 gm   





    In Amino Acid 5%-D15w 1,   





    000 ml @ 55 mls/hr IV .   





    [id number] [facility][id number]   


 


  Oral  250 


 


Output:   


 


  Drainage 40  


 


    Right Lower Abdomen 40  


 


  Urine 1575 1450 1200


 


  Stool 200 850 100


 


Other:   


 


  Voiding Method Indwelling Catheter Indwelling Catheter Indwelling Catheter








                       ABP, PAP, CO, CI - Last Documented











Arterial Blood Pressure        89/47

















- Labs


CBC & Chem 7: 


                                 06/25/20 05:25





                                 06/25/20 05:25


Labs: 


                  Abnormal Lab Results - Last 24 Hours (Table)











  06/25/20 06/25/20 06/25/20 Range/Units





  05:25 05:25 17:33 


 


WBC   13.7 H   (3.8-10.6)  k/uL


 


RBC   3.79 L   (3.80-5.40)  m/uL


 


RDW   17.0 H   (11.5-15.5)  %


 


Neutrophils #   11.6 H   (1.3-7.7)  k/uL


 


Sodium  133 L    (137-145)  mmol/L


 


Creatinine  0.34 L    (0.52-1.04)  mg/dL


 


Glucose  114 H    (74-99)  mg/dL


 


POC Glucose (mg/dL)    101 H  (75-99)  mg/dL


 


Calcium  8.1 L    (8.4-10.2)  mg/dL








                      Microbiology - Last 24 Hours (Table)











 06/24/20 13:50 Blood Culture - Preliminary





 Blood    No Growth after 24 hours


 


 06/19/20 05:10 Blood Culture - Final





 Blood    No Growth after 144 hours














Assessment and Plan


(1) Peritonitis (acute) generalized


Current Visit: Yes   Status: Acute   Code(s): K65.0 - GENERALIZED (ACUTE) 

PERITONITIS   SNOMED Code(s): 31868509


   





(2) History of gastric bypass


Current Visit: Yes   Status: Acute   Code(s): Z98.84 - BARIATRIC SURGERY STATUS 

 SNOMED Code(s): 102856575


   





(3) Medical non-compliance


Current Visit: Yes   Status: Acute   Code(s): Z91.19 - PATIENT'S NONCOMPLIANCE W

OTH MEDICAL TREATMENT AND REGIMEN   SNOMED Code(s): 216860436


   





(4) Tobacco abuse


Current Visit: Yes   Status: Acute   Code(s): Z72.0 - TOBACCO USE   SNOMED 

Code(s): 770038083


   





(5) Tobacco abuse counseling


Current Visit: Yes   Status: Acute   Code(s): Z71.6 - TOBACCO ABUSE COUNSELING  

SNOMED Code(s): 175415621


   





(6) Depressive disorder


Current Visit: Yes   Status: Acute   Code(s): F32.9 - MAJOR DEPRESSIVE DISORDER,

SINGLE EPISODE, UNSPECIFIED   SNOMED Code(s): 30608250


   





(7) Hypothyroidism


Current Visit: Yes   Status: Acute   Code(s): E03.9 - HYPOTHYROIDISM, 

UNSPECIFIED   SNOMED Code(s): 10018578


   





(8) Abdominal pain


Current Visit: Yes   Status: Acute   Code(s): R10.9 - UNSPECIFIED ABDOMINAL PAIN

  SNOMED Code(s): 44162156


   





(9) Free intraperitoneal air


Current Visit: Yes   Status: Acute   Code(s): K66.8 - OTHER SPECIFIED DISORDERS 

OF PERITONEUM   SNOMED Code(s): 88896582


   





(10) Dehydration


Current Visit: Yes   Status: Acute   Code(s): E86.0 - DEHYDRATION   SNOMED 

Code(s): 02146007

## 2020-06-18 NOTE — P.PN
Subjective


Progress Note Date: 06/18/20





Patient was seen for a follow-up.  Patient apparently was showing clinical 

improvement this morning, with some response in talking few words.  Patient 

however was in pain.  She was given 0.5 mg Dilaudid at 8:30 PM last night, then 

there are 0.5 mg Dilaudid at 3:30 AM and then 11 AM this morning also.  She also

received 4 mg of Haldol for restlessness and/any agitation at 9:45 AM.  Patient 

subsequently became more groggy, less responsive.  At present patient is just 

moaning, speaks "OK", or "all right".  Patient continues to be encephalopathic. 







Objective





- Vital Signs


Vital signs: 


                                   Vital Signs











Temp  99.7 F H  06/18/20 18:00


 


Pulse  107 H  06/18/20 18:00


 


Resp  25 H  06/18/20 18:00


 


BP  145/90   06/18/20 18:00


 


Pulse Ox  95   06/18/20 18:00








                                 Intake & Output











 06/18/20 06/18/20 06/19/20





 06:59 18:59 06:59


 


Intake Total 1501 2430 


 


Output Total 1290 1870 


 


Balance 211 560 


 


Weight  66.2 kg 


 


Intake:   


 


  IV 1400 1400 


 


    Pipercillin/ Tazobactam 3 100 100 





    .375 g   


 


    Sodium Chloride 0.45% 1, 1200 1200 





    000 ml @ 100 mls/hr IV .   





    Q10H [facility] Rx#:[id number]   


 


    metroNIDAZOLE-NS  100 100 





    mg   


 


  Intake, IV Titration 101 1030 





  Amount   


 


    Fat Emulsion 20% 250 ml @ 21  





    20.833 mls/hr IV DAILY@   





    1200 [facility] Rx#:[id number]   


 


    Potassium Chloride 20 meq  500 





    In Water For Injection 1   





    100ml.bag @ 50 mls/hr   





    IVPB ONCE ONE Rx#:   





    [id number]   


 


    Potassium Chloride 20 meq 50  





    In Water For Injection 1   





    100ml.bag @ 50 mls/hr   





    IVPB Q2H [facility] Rx#:   





    [id number]   


 


    Potassium Phosphate 10  200 





    mmol In Sodium Chloride 0   





    .9% 100 ml @ 50 mls/hr IV   





    Q2H [facility] Rx#:[id number]   


 


    Sodium Acetate 30 meq 30 330 





    Potassium Chloride 20 meq   





    Potassium Acetate 20 meq   





    Calcium Gluconate 1 gm   





    Mvi, Adult No.4 with Vit   





    K 10 ml Trace (Conc-1Ml/   





    Dose) 1 ml In Amino Acid   





    5%-D15w 1,000 ml @ 30 mls   





    /hr IV .Q24H ONE Rx#:   





    [id number]   


 


Output:   


 


  Drainage 30 125 


 


    Right Lower Abdomen 30 125 


 


  Urine 1260 1745 


 


Other:   


 


  Voiding Method Indwelling Catheter Indwelling Catheter 








                       ABP, PAP, CO, CI - Last Documented











Arterial Blood Pressure        147/93

















- Exam





Patient is encephalopathic, moaning and groaning, sometimes is okay.  Exam 

limited.  Not on any constant sedation.





- Labs


CBC & Chem 7: 


                                 06/18/20 09:10





                                 06/18/20 15:01


Labs: 


                  Abnormal Lab Results - Last 24 Hours (Table)











  06/17/20 06/18/20 06/18/20 Range/Units





  23:52 04:40 05:29 


 


RBC     (3.80-5.40)  m/uL


 


Hgb     (11.4-16.0)  gm/dL


 


RDW     (11.5-15.5)  %


 


Lymphocytes #     (1.0-4.8)  k/uL


 


Potassium   2.4 L*   (3.5-5.1)  mmol/L


 


Chloride   112 H   ()  mmol/L


 


BUN   22 H   (7-17)  mg/dL


 


Creatinine     (0.52-1.04)  mg/dL


 


Glucose   116 H   (74-99)  mg/dL


 


POC Glucose (mg/dL)  130 H   117 H  (75-99)  mg/dL


 


Calcium   7.5 L   (8.4-10.2)  mg/dL


 


Phosphorus   0.7 L*   (2.5-4.5)  mg/dL


 


Magnesium   2.4 H   (1.6-2.3)  mg/dL


 


AST     (14-36)  U/L


 


Total Protein     (6.3-8.2)  g/dL


 


Albumin   2.0 L   (3.5-5.0)  g/dL


 


Vitamin D 25-Hydroxy     (30.0-100.0)  ng/mL


 


TSH     (0.465-4.680)  mIU/L


 


Free T4     (0.78-2.19)  ng/dL














  06/18/20 06/18/20 06/18/20 Range/Units





  09:10 09:10 11:39 


 


RBC   3.60 L   (3.80-5.40)  m/uL


 


Hgb   11.2 L   (11.4-16.0)  gm/dL


 


RDW   17.0 H   (11.5-15.5)  %


 


Lymphocytes #   0.7 L   (1.0-4.8)  k/uL


 


Potassium  3.2 L    (3.5-5.1)  mmol/L


 


Chloride  113 H    ()  mmol/L


 


BUN  20 H    (7-17)  mg/dL


 


Creatinine  0.51 L    (0.52-1.04)  mg/dL


 


Glucose  106 H    (74-99)  mg/dL


 


POC Glucose (mg/dL)     (75-99)  mg/dL


 


Calcium  7.5 L    (8.4-10.2)  mg/dL


 


Phosphorus     (2.5-4.5)  mg/dL


 


Magnesium     (1.6-2.3)  mg/dL


 


AST  81 H    (14-36)  U/L


 


Total Protein  4.1 L    (6.3-8.2)  g/dL


 


Albumin  2.0 L    (3.5-5.0)  g/dL


 


Vitamin D 25-Hydroxy    24.6 L  (30.0-100.0)  ng/mL


 


TSH    6.860 H  (0.465-4.680)  mIU/L


 


Free T4    0.57 L  (0.78-2.19)  ng/dL














  06/18/20 06/18/20 06/18/20 Range/Units





  11:51 15:01 17:55 


 


RBC     (3.80-5.40)  m/uL


 


Hgb     (11.4-16.0)  gm/dL


 


RDW     (11.5-15.5)  %


 


Lymphocytes #     (1.0-4.8)  k/uL


 


Potassium   3.3 L   (3.5-5.1)  mmol/L


 


Chloride     ()  mmol/L


 


BUN     (7-17)  mg/dL


 


Creatinine     (0.52-1.04)  mg/dL


 


Glucose     (74-99)  mg/dL


 


POC Glucose (mg/dL)  121 H   124 H  (75-99)  mg/dL


 


Calcium     (8.4-10.2)  mg/dL


 


Phosphorus     (2.5-4.5)  mg/dL


 


Magnesium     (1.6-2.3)  mg/dL


 


AST     (14-36)  U/L


 


Total Protein     (6.3-8.2)  g/dL


 


Albumin     (3.5-5.0)  g/dL


 


Vitamin D 25-Hydroxy     (30.0-100.0)  ng/mL


 


TSH     (0.465-4.680)  mIU/L


 


Free T4     (0.78-2.19)  ng/dL








                      Microbiology - Last 24 Hours (Table)











 06/15/20 16:30 Blood Culture - Preliminary





 Blood    No Growth after 72 hours


 


 06/15/20 16:25 Blood Culture - Preliminary





 Blood    No Growth after 72 hours


 


 06/14/20 23:00 Anaerobic Culture - Preliminary





 Abdomen    Anaerobic Gm Negative Bacilli





    Anaerobic Gm Negative Bacilli#2





    Clostridium species


 


 06/14/20 23:00 Anaerobic Culture - Preliminary





 Abdomen    Anaerobic Gm Negative Bacilli





    Clostridium species














Assessment and Plan


Assessment: 





* Altered mental status, likely related to toxic metabolic encephalopathy.  

  Etiology multifactorial, as mentioned below.  Patient still receiving 

  Dilaudid, and Haldol, which may be contributing to persistent encephalopathy. 

  


* Acute kidney injury, secondary to hypotension, improving.


* Acute abdomen with perforated bowel, status post exploratory laparotomy, 

  sigmoid colectomy and colostomy.


* Status post hypoxic acute respiratory failure, status post extubation.


* Sepsis related to intra-abdominal source and bowel perforation with fecal 

  peritonitis.


Plan: 





* Avoid sedatives, hypnotics.


* We will follow clinically.


* B12 is normal 1457, folate 9.0, TSH is elevated 6.86, with low free T4 0.57.  

  Patient may have to be started on thyroid replacement.


* Your medical management.

## 2020-06-18 NOTE — XR
EXAMINATION TYPE: XR chest 1V portable

 

DATE OF EXAM: 6/18/2020

 

Comparison: 6/17/2020

 

Clinical History: 59 year-old female line placement

 

Findings:

Right IJ CVC tip at the cavoatrial junction. ET tube removed in the interval. Heart border line in si
ze. New interstitial densities bilaterally. Continued patchy retrocardiac and left basilar opacity. N
ew curvilinear edges projecting along the periphery of the bilateral upper lungs, suspect some type o
f summation artifact. Close follow-up recommended.

 

 

Impression:

 

1. New curvilinear edge projecting along the periphery of the bilateral upper lungs. Suspect some typ
e of summation artifact. Short interval follow-up and close clinical surveillance recommended to excl
ude the less likely possibility of small bilateral pneumothoraces.

2. New interstitial densities. Correlate for fluid overload state and [place] pulmonary vascular congest
ion.

3. Continued retrocardiac and left basilar airspace disease.

## 2020-06-19 LAB
ALBUMIN SERPL-MCNC: 2.4 G/DL (ref 3.5–5)
ALP SERPL-CCNC: 102 U/L (ref 38–126)
ALT SERPL-CCNC: 30 U/L (ref 4–34)
ANION GAP SERPL CALC-SCNC: 4 MMOL/L
AST SERPL-CCNC: 62 U/L (ref 14–36)
BASOPHILS # BLD AUTO: 0 K/UL (ref 0–0.2)
BASOPHILS NFR BLD AUTO: 0 %
BUN SERPL-SCNC: 16 MG/DL (ref 7–17)
CALCIUM SPEC-MCNC: 7.9 MG/DL (ref 8.4–10.2)
CHLORIDE SERPL-SCNC: 113 MMOL/L (ref 98–107)
CO2 BLDA-SCNC: 26 MMOL/L (ref 19–24)
CO2 BLDA-SCNC: 30 MMOL/L (ref 19–24)
CO2 BLDA-SCNC: 30 MMOL/L (ref 19–24)
CO2 SERPL-SCNC: 25 MMOL/L (ref 22–30)
EOSINOPHIL # BLD AUTO: 0.1 K/UL (ref 0–0.7)
EOSINOPHIL NFR BLD AUTO: 1 %
ERYTHROCYTE [DISTWIDTH] IN BLOOD BY AUTOMATED COUNT: 4.15 M/UL (ref 3.8–5.4)
ERYTHROCYTE [DISTWIDTH] IN BLOOD: 17 % (ref 11.5–15.5)
GLUCOSE BLD-MCNC: 135 MG/DL (ref 75–99)
GLUCOSE BLD-MCNC: 151 MG/DL (ref 75–99)
GLUCOSE BLD-MCNC: 169 MG/DL (ref 75–99)
GLUCOSE BLD-MCNC: 184 MG/DL (ref 75–99)
GLUCOSE SERPL-MCNC: 164 MG/DL (ref 74–99)
HCO3 BLDA-SCNC: 25 MMOL/L (ref 21–25)
HCO3 BLDA-SCNC: 29 MMOL/L (ref 21–25)
HCO3 BLDA-SCNC: 29 MMOL/L (ref 21–25)
HCT VFR BLD AUTO: 40.1 % (ref 34–46)
HGB BLD-MCNC: 12.9 GM/DL (ref 11.4–16)
LYMPHOCYTES # SPEC AUTO: 1 K/UL (ref 1–4.8)
LYMPHOCYTES NFR SPEC AUTO: 10 %
MAGNESIUM SPEC-SCNC: 2.1 MG/DL (ref 1.6–2.3)
MCH RBC QN AUTO: 31 PG (ref 25–35)
MCHC RBC AUTO-ENTMCNC: 32.1 G/DL (ref 31–37)
MCV RBC AUTO: 96.6 FL (ref 80–100)
MONOCYTES # BLD AUTO: 0.3 K/UL (ref 0–1)
MONOCYTES NFR BLD AUTO: 3 %
NEUTROPHILS # BLD AUTO: 7.9 K/UL (ref 1.3–7.7)
NEUTROPHILS NFR BLD AUTO: 82 %
PCO2 BLDA: 32 MMHG (ref 35–45)
PCO2 BLDA: 32 MMHG (ref 35–45)
PCO2 BLDA: 37 MMHG (ref 35–45)
PH BLDA: 7.51 [PH] (ref 7.35–7.45)
PH BLDA: 7.51 [PH] (ref 7.35–7.45)
PH BLDA: 7.57 [PH] (ref 7.35–7.45)
PLATELET # BLD AUTO: 205 K/UL (ref 150–450)
PO2 BLDA: 131 MMHG (ref 83–108)
PO2 BLDA: 53 MMHG (ref 83–108)
POTASSIUM SERPL-SCNC: 3.9 MMOL/L (ref 3.5–5.1)
PROT SERPL-MCNC: 4.8 G/DL (ref 6.3–8.2)
SODIUM SERPL-SCNC: 142 MMOL/L (ref 137–145)
TRIGL SERPL-MCNC: 279 MG/DL (ref ?–150)
WBC # BLD AUTO: 9.6 K/UL (ref 3.8–10.6)
ZINC SERPL-MCNC: 29 UG/DL (ref 60–130)

## 2020-06-19 PROCEDURE — 0BH17EZ INSERTION OF ENDOTRACHEAL AIRWAY INTO TRACHEA, VIA NATURAL OR ARTIFICIAL OPENING: ICD-10-PCS

## 2020-06-19 PROCEDURE — 5A1945Z RESPIRATORY VENTILATION, 24-96 CONSECUTIVE HOURS: ICD-10-PCS

## 2020-06-19 RX ADMIN — PROPOFOL SCH MLS/HR: 10 INJECTION, EMULSION INTRAVENOUS at 20:52

## 2020-06-19 RX ADMIN — SODIUM BICARBONATE SCH MLS/HR: 84 INJECTION, SOLUTION INTRAVENOUS at 00:55

## 2020-06-19 RX ADMIN — INSULIN ASPART SCH UNIT: 100 INJECTION, SOLUTION INTRAVENOUS; SUBCUTANEOUS at 18:03

## 2020-06-19 RX ADMIN — POTASSIUM CHLORIDE SCH MLS/HR: 14.9 INJECTION, SOLUTION INTRAVENOUS at 00:53

## 2020-06-19 RX ADMIN — LEVOTHYROXINE SODIUM ANHYDROUS SCH MCG: 100 INJECTION, POWDER, LYOPHILIZED, FOR SOLUTION INTRAVENOUS at 08:24

## 2020-06-19 RX ADMIN — DEXTRAN 70 AND HYPROMELLOSE 2910 SCH DROPS: 1; 3 SOLUTION/ DROPS OPHTHALMIC at 23:50

## 2020-06-19 RX ADMIN — LEUCINE, PHENYLALANINE, LYSINE, METHIONINE, ISOLEUCINE, VALINE, HISTIDINE, THREONINE, TRYPTOPHAN, ALANINE, GLYCINE, ARGININE, PROLINE, SERINE, TYROSINE, DEXTROSE SCH: 365; 280; 290; 200; 300; 290; 240; 210; 90; 1035; 515; 575; 340; 250; 20; 15 INJECTION INTRAVENOUS at 18:02

## 2020-06-19 RX ADMIN — POTASSIUM CHLORIDE SCH MLS/HR: 14.9 INJECTION, SOLUTION INTRAVENOUS at 17:44

## 2020-06-19 RX ADMIN — FUROSEMIDE SCH MG: 10 INJECTION, SOLUTION INTRAMUSCULAR; INTRAVENOUS at 07:52

## 2020-06-19 RX ADMIN — SPIRONOLACTONE SCH: 25 TABLET, FILM COATED ORAL at 08:18

## 2020-06-19 RX ADMIN — SODIUM BICARBONATE SCH: 84 INJECTION, SOLUTION INTRAVENOUS at 18:26

## 2020-06-19 RX ADMIN — LEUCINE, PHENYLALANINE, LYSINE, METHIONINE, ISOLEUCINE, VALINE, HISTIDINE, THREONINE, TRYPTOPHAN, ALANINE, GLYCINE, ARGININE, PROLINE, SERINE, TYROSINE, DEXTROSE SCH MLS/HR: 365; 280; 290; 200; 300; 290; 240; 210; 90; 1035; 515; 575; 340; 250; 20; 15 INJECTION INTRAVENOUS at 04:34

## 2020-06-19 RX ADMIN — HYDROMORPHONE HYDROCHLORIDE PRN MG: 1 INJECTION, SOLUTION INTRAMUSCULAR; INTRAVENOUS; SUBCUTANEOUS at 13:43

## 2020-06-19 RX ADMIN — INSULIN ASPART SCH UNIT: 100 INJECTION, SOLUTION INTRAVENOUS; SUBCUTANEOUS at 13:24

## 2020-06-19 RX ADMIN — PANTOPRAZOLE SODIUM SCH MG: 40 INJECTION, POWDER, FOR SOLUTION INTRAVENOUS at 08:24

## 2020-06-19 RX ADMIN — METOPROLOL TARTRATE SCH MG: 1 INJECTION, SOLUTION INTRAVENOUS at 20:11

## 2020-06-19 RX ADMIN — SODIUM CHLORIDE SCH MG: 900 INJECTION, SOLUTION INTRAVENOUS at 20:11

## 2020-06-19 RX ADMIN — POTASSIUM CHLORIDE SCH MLS/HR: 14.9 INJECTION, SOLUTION INTRAVENOUS at 20:11

## 2020-06-19 RX ADMIN — HYDROMORPHONE HYDROCHLORIDE PRN MG: 1 INJECTION, SOLUTION INTRAMUSCULAR; INTRAVENOUS; SUBCUTANEOUS at 08:33

## 2020-06-19 RX ADMIN — METRONIDAZOLE SCH MLS/HR: 500 INJECTION, SOLUTION INTRAVENOUS at 11:15

## 2020-06-19 RX ADMIN — PIPERACILLIN AND TAZOBACTAM SCH MLS/HR: 3; .375 INJECTION, POWDER, FOR SOLUTION INTRAVENOUS at 11:15

## 2020-06-19 RX ADMIN — METRONIDAZOLE SCH MLS/HR: 500 INJECTION, SOLUTION INTRAVENOUS at 05:22

## 2020-06-19 RX ADMIN — PIPERACILLIN AND TAZOBACTAM SCH MLS/HR: 3; .375 INJECTION, POWDER, FOR SOLUTION INTRAVENOUS at 04:32

## 2020-06-19 RX ADMIN — HEPARIN SODIUM SCH UNIT: 5000 INJECTION, SOLUTION INTRAVENOUS; SUBCUTANEOUS at 20:11

## 2020-06-19 RX ADMIN — HEPARIN SODIUM SCH UNIT: 5000 INJECTION, SOLUTION INTRAVENOUS; SUBCUTANEOUS at 08:24

## 2020-06-19 RX ADMIN — PROPOFOL SCH MLS/HR: 10 INJECTION, EMULSION INTRAVENOUS at 17:45

## 2020-06-19 RX ADMIN — SOYBEAN OIL SCH MLS/HR: 20 INJECTION, SOLUTION INTRAVENOUS at 14:47

## 2020-06-19 RX ADMIN — PIPERACILLIN AND TAZOBACTAM SCH MLS/HR: 3; .375 INJECTION, POWDER, FOR SOLUTION INTRAVENOUS at 20:10

## 2020-06-19 RX ADMIN — METRONIDAZOLE SCH MLS/HR: 500 INJECTION, SOLUTION INTRAVENOUS at 17:44

## 2020-06-19 RX ADMIN — METOPROLOL TARTRATE SCH MG: 1 INJECTION, SOLUTION INTRAVENOUS at 08:24

## 2020-06-19 RX ADMIN — LEUCINE, PHENYLALANINE, LYSINE, METHIONINE, ISOLEUCINE, VALINE, HISTIDINE, THREONINE, TRYPTOPHAN, ALANINE, GLYCINE, ARGININE, PROLINE, SERINE, TYROSINE, SODIUM ACETATE, DIBASIC POTASSIUM PHOSPHATE, MAGNESIUM CHLORIDE, SODIUM CHLORIDE, CALCIUM CHLORIDE, DEXTROSE SCH MLS/HR
365; 280; 290; 200; 300; 290; 240; 210; 90; 1035; 515; 575; 340; 250; 20; 340; 261; 51; 59; 33; 15 INJECTION INTRAVENOUS at 18:26

## 2020-06-19 RX ADMIN — DEXTRAN 70 AND HYPROMELLOSE 2910 SCH DROPS: 1; 3 SOLUTION/ DROPS OPHTHALMIC at 21:35

## 2020-06-19 RX ADMIN — SODIUM BICARBONATE SCH: 84 INJECTION, SOLUTION INTRAVENOUS at 08:28

## 2020-06-19 RX ADMIN — SODIUM CHLORIDE SCH MG: 900 INJECTION, SOLUTION INTRAVENOUS at 11:18

## 2020-06-19 RX ADMIN — LEUCINE, PHENYLALANINE, LYSINE, METHIONINE, ISOLEUCINE, VALINE, HISTIDINE, THREONINE, TRYPTOPHAN, ALANINE, GLYCINE, ARGININE, PROLINE, SERINE, TYROSINE, DEXTROSE SCH MLS/HR: 365; 280; 290; 200; 300; 290; 240; 210; 90; 1035; 515; 575; 340; 250; 20; 15 INJECTION INTRAVENOUS at 02:33

## 2020-06-19 RX ADMIN — SODIUM FERRIC GLUCONATE COMPLEX SCH MLS/HR: 12.5 INJECTION INTRAVENOUS at 08:46

## 2020-06-19 RX ADMIN — CHLORHEXIDINE GLUCONATE SCH ML: 1.2 RINSE ORAL at 22:17

## 2020-06-19 NOTE — PN
PROGRESS NOTE



PULMONARY/CRITICAL CARE PROGRESS NOTE:



DATE OF SERVICE:

06/19/2020



Critical care time 31 minutes.



This is a 59-year-old female seen 4 days ago in consultation.  She came to the

emergency room on June 14, 2020 for abdominal pain.  She is postop day #5, status post

exploratory laparotomy with sigmoid colectomy for perforated sigmoid colon, Roselia's

procedure, disimpaction of the descending colon, open appendectomy, abdominal washout

for fecal peritonitis, placement of a Jimenez-Rincon drain, and application of an

incisional wound VAC system.  All in all, the patient was doing relatively well until

sometime early this morning were her saturations started to drop and she appeared to

develop some respiratory difficulty.  She had blood gases done on 15 L high flow.  PO2

of 52, pCO2 of 32, pH of 7.51.  These blood gases are consistent with a combined

respiratory and metabolic alkalosis.  She was placed on BiPAP 10, 5 and 100%.

Saturations are now 100% and the FiO2 could be titrated.  She is getting half-normal

saline at 100 mL an hour.  I asked the nurses to pivel that.  She is also getting TPN

at 30 mL an hour.  She is on Zosyn and Flagyl with antibiotics.



The plan is to give her some additional diuretic.  Will give her Lasix 40 IV push.

Will turn down the IV fluids.  Will titrate down the FiO2.  In addition, the chest x-

ray shows diffuse chest x-ray infiltrates which could be consistent with acute lung

injury/early ARDS, cardiogenic pulmonary edema and/or pneumonia.  She is on antibiotics

as mentioned.



Current vital signs are reviewed, temperature is 97.1 heart rate 124, respiratory rate

25, blood pressure 143/105 mean 117, central venous pressure is 21, and saturations are

92%.  Currently on BiPAP 10, 5 and 100%.  Appears in no acute distress.  Seems to be

tolerating the BiPAP relatively well.

HEENT: Examination is grossly unremarkable.  BiPAP mask in place.

NECK:  Supple, full range of motion.  No adenopathy or thyromegaly.  Neck veins are

flat.

CARDIOVASCULAR: Examination reveals tachycardia.  S1, S2 normal.  Heart rate about 115

beats per minute.

LUNGS: Reveal diffuse coarse rhonchi.  There are some crackles.  Breath sounds equal.

ABDOMEN:  Soft.  No bowel sounds.

EXTREMITIES:  Intact.  No significant edema.

SKIN: Without rash.

NEUROLOGIC: Examination is brief but nonfocal.



LABS:

Reviewed.  White count 9.6, hemoglobin 12.9, hematocrit 40.1, platelet count 205,000.

Blood gases as mentioned.  PO2 is 52, pCO2 is 32, pH of 7.51.  Sodium 142, potassium

3.9 chloride is 113, CO2 is 25, anion gap is 4. BUN and creatinine were 16 and 0.34.

Calcium 7.9, triglycerides 279.  Albumin 2.4.



Microbiology is showing abdominal cultures showing anaerobic gram-negative bacilli, 2

different species, and Clostridium species and also on 6/14, abdominal culture showing

anaerobic gram-negative bacilli and Clostridium species.  Blood and urine are both

negative.



Her chest x-ray from this morning compared to yesterday's chest x-ray shows increasing

pulmonary venous congestion consistent probably with CHF.



MEDICATIONS:

Reviewed.  She is currently on Tylenol, TPN, lipids, Haldol p.r.n., subcu heparin,

Dilaudid, insulin, levothyroxine, metoprolol, Flagyl, Narcan, Protonix, phosphorus

replacement, Zosyn, potassium replacement, and Aldactone.



ASSESSMENT:

1. Postoperative day #5, status post exploratory laparotomy with sigmoid colectomy for

    perforated sigmoid colon, Roselia's procedure, disimpaction of descending colon,

    open appendectomy, abdominal washout for fecal peritonitis, placement of a Jimenez-

    Rincon drain, and application of an incisional wound VAC system.

2. Worsening oxygenation, which may be multifactorial in part related to bibasilar

    pneumonia, early acute lung injury/ARDS and just plain fluid overload (cardiogenic

    pulmonary edema).

3. History of hypothyroidism.

4. History of ongoing tobacco use and nicotine addiction.

5. Status post successful extubation.

6. Mental status changes, likely related to septic encephalopathy.

7. Successful extubation on June 17.



PLAN:

Currently, the patient's oxygenation is obviously of concern.  Based on her Is and Os,

it appears that she is way ahead on fluid maybe 8 L or so.  We will give her some

additional diuretic.  The IV fluids were turned off.  The patient's CVP was elevated.

Will titrate the FiO2.  The patient remains on TPN.  She is on Zosyn and Flagyl with

antibiotics.  Culture data is reviewed.  Infectious Disease is on the case.  Overall

prognosis remains very guarded.  Will continue to follow.





MMODL / IJN: [id number] / Job#: [id number]

## 2020-06-19 NOTE — P.PN
<LeoRoula NATALIA - Last Filed: 06/19/20 13:48>





Subjective


Progress Note Date: 06/19/20





CHIEF COMPLAINT: Abdominal pain





HISTORY OF PRESENT ILLNESS: 59-year-old female who underwent exploratory 

laparotomy with sigmoid colectomy, descending colostomy creation, and 

appendectomy with Dr. Reed on June 14, 2020. Patient examined this morning 

in the ICU. Patient with increased oxygen demands overnight. She is currently on

Bipap with Fio2 of 100%. WBC 9.6. Hemoglobin 12.9





PHYSICAL EXAM: 


VITAL SIGNS: Reviewed


GENERAL: Well-developed in no acute distress


HEENT:  No sclera icterus. Extraocular movements grossly intact.  Moist buccal 

mucosa. 


Head is atraumatic, normocephalic. No nasal drainage.


NECK:  Supple without lymphadenopathy.


CHEST:  Non-labored respirations and equal bilateral excursions


CARDIOVASCULAR:  Tachycardic.  Regular rate with regular rhythm.  Palpable 2+ 

radial pulses.


ABDOMEN:  Soft.  Nondistended. PREVENA wound vac noted. Ostomy to left side of 

abdomen. No stool or gas noted. Stoma pink. TERESSA drain with serous drainage.


MUSCULOSKELETAL:  No clubbing or cyanosis.


NEUROLOGIC:  Slightly more lethargic compared to yesterday.


PSYCH:  Unable to assess secondary to altered mental status


SKIN: Well perfused.  Good skin turgor. 





ASSESSMENT: 


1.  Abdominal pain secondary to ruptured sigmoid colon, fecal peritonitis, 

descending and sigmoid colon impaction, appendicolith with appendicitis





PLAN: 


Continue TPN


Monitor TERESSA drain


Continue antibiotics. Monitor labs


Neurology on consult. Appreciate recommendations. 


Thiamine level pending. Dr. Reed recommends beginning thiamine supplemen

tation now to see if mental status improves


Patient started on IV synthroid


Further ICU management per Dr. Castaneda.





Nurse practitioner note has been reviewed by physician. Signing provider agrees 

with the documented findings, assessment, and plan of care. 








Objective





- Vital Signs


Vital signs: 


                                   Vital Signs











Temp  97.4 F L  06/19/20 08:00


 


Pulse  121 H  06/19/20 13:00


 


Resp  41 H  06/19/20 13:00


 


BP  119/95   06/19/20 13:00


 


Pulse Ox  96   06/19/20 13:00








                                 Intake & Output











 06/18/20 06/19/20 06/19/20





 18:59 06:59 18:59


 


Intake Total 2430 1300 380


 


Output Total 1870 1485 1420


 


Balance 560 -185 -1040


 


Weight 66.2 kg  


 


Intake:   


 


  IV 1400 1300 380


 


    Acetaminophen 1000mg  100 


 


    Pipercillin/ Tazobactam 3 100 100 





    .375 g   


 


    Potassium Chloride 20 meq   100





    In Water For Injection 1   





    100ml.bag @ 50 mls/hr   





    IVPB Q2H [facility] Rx#:   





    [id number]   


 


    Sodium Chloride 0.45% 1, 1200 1100 180





    000 ml @ 20 mls/hr IV .   





    Q24H [facility] Rx#:[id number]   


 


    Sodium Ferric Gluconat-   100





    Sucrose 125 mg In Sodium   





    Chloride 0.9% 100 ml @   





    100 mls/hr IVPB DAILY [facility]   





    Rx#:[id number]   


 


    metroNIDAZOLE-NS  100  





    mg   


 


  Intake, IV Titration 1030  





  Amount   


 


    Potassium Chloride 20 meq 500  





    In Water For Injection 1   





    100ml.bag @ 50 mls/hr   





    IVPB ONCE ONE Rx#:   





    [id number]   


 


    Potassium Phosphate 10 200  





    mmol In Sodium Chloride 0   





    .9% 100 ml @ 50 mls/hr IV   





    Q2H [facility] Rx#:[id number]   


 


    Sodium Acetate 30 meq 330  





    Potassium Chloride 20 meq   





    Potassium Acetate 20 meq   





    Calcium Gluconate 1 gm   





    Mvi, Adult No.4 with Vit   





    K 10 ml Trace (Conc-1Ml/   





    Dose) 1 ml In Amino Acid   





    5%-D15w 1,000 ml @ 30 mls   





    /hr IV .Q24H [facility] Rx#:   





    [id number]   


 


Output:   


 


  Drainage 125 50 50


 


    Right Lower Abdomen 125 50 50


 


  Urine 1745 1435 1370


 


Other:   


 


  Voiding Method Indwelling Catheter Indwelling Catheter Indwelling Catheter








                       ABP, PAP, CO, CI - Last Documented











Arterial Blood Pressure        124/102

















- Labs


CBC & Chem 7: 


                                 06/19/20 05:10





                                 06/19/20 05:10


Labs: 


                  Abnormal Lab Results - Last 24 Hours (Table)











  06/18/20 06/18/20 06/18/20 Range/Units





  11:39 11:39 11:39 


 


RDW     (11.5-15.5)  %


 


Neutrophils #     (1.3-7.7)  k/uL


 


ABG pH     (7.35-7.45)  


 


ABG pCO2     (35-45)  mmHg


 


ABG pO2     ()  mmHg


 


ABG Total CO2     (19-24)  mmol/L


 


ABG O2 Saturation     (94-97)  %


 


Potassium     (3.5-5.1)  mmol/L


 


Chloride     ()  mmol/L


 


Creatinine     (0.52-1.04)  mg/dL


 


Glucose     (74-99)  mg/dL


 


POC Glucose (mg/dL)     (75-99)  mg/dL


 


Hemoglobin A1c  6.7 H    (4.0-6.0)  %


 


Calcium     (8.4-10.2)  mg/dL


 


Phosphorus     (2.5-4.5)  mg/dL


 


Iron   7 L   ()  ug/dL


 


% Saturation   2.99 L   (12.00-45.00)   


 


AST     (14-36)  U/L


 


Total Protein     (6.3-8.2)  g/dL


 


Albumin     (3.5-5.0)  g/dL


 


Triglycerides     (<150)  mg/dL


 


Vitamin B12   1457.0 H   (200.0-944.0)  pg/mL


 


Vitamin D 25-Hydroxy   24.6 L   (30.0-100.0)  ng/mL


 


Zinc    29 L  ()  ug/dL














  06/18/20 06/18/20 06/18/20 Range/Units





  15:01 17:55 21:38 


 


RDW     (11.5-15.5)  %


 


Neutrophils #     (1.3-7.7)  k/uL


 


ABG pH     (7.35-7.45)  


 


ABG pCO2     (35-45)  mmHg


 


ABG pO2     ()  mmHg


 


ABG Total CO2     (19-24)  mmol/L


 


ABG O2 Saturation     (94-97)  %


 


Potassium  3.3 L   3.3 L  (3.5-5.1)  mmol/L


 


Chloride     ()  mmol/L


 


Creatinine     (0.52-1.04)  mg/dL


 


Glucose     (74-99)  mg/dL


 


POC Glucose (mg/dL)   124 H   (75-99)  mg/dL


 


Hemoglobin A1c     (4.0-6.0)  %


 


Calcium     (8.4-10.2)  mg/dL


 


Phosphorus    1.2 L  (2.5-4.5)  mg/dL


 


Iron     ()  ug/dL


 


% Saturation     (12.00-45.00)   


 


AST     (14-36)  U/L


 


Total Protein     (6.3-8.2)  g/dL


 


Albumin     (3.5-5.0)  g/dL


 


Triglycerides     (<150)  mg/dL


 


Vitamin B12     (200.0-944.0)  pg/mL


 


Vitamin D 25-Hydroxy     (30.0-100.0)  ng/mL


 


Zinc     ()  ug/dL














  06/18/20 06/19/20 06/19/20 Range/Units





  23:58 05:10 05:10 


 


RDW    17.0 H  (11.5-15.5)  %


 


Neutrophils #    7.9 H  (1.3-7.7)  k/uL


 


ABG pH     (7.35-7.45)  


 


ABG pCO2     (35-45)  mmHg


 


ABG pO2     ()  mmHg


 


ABG Total CO2     (19-24)  mmol/L


 


ABG O2 Saturation     (94-97)  %


 


Potassium     (3.5-5.1)  mmol/L


 


Chloride   113 H   ()  mmol/L


 


Creatinine   0.34 L   (0.52-1.04)  mg/dL


 


Glucose   164 H   (74-99)  mg/dL


 


POC Glucose (mg/dL)  135 H    (75-99)  mg/dL


 


Hemoglobin A1c     (4.0-6.0)  %


 


Calcium   7.9 L   (8.4-10.2)  mg/dL


 


Phosphorus     (2.5-4.5)  mg/dL


 


Iron     ()  ug/dL


 


% Saturation     (12.00-45.00)   


 


AST   62 H   (14-36)  U/L


 


Total Protein   4.8 L   (6.3-8.2)  g/dL


 


Albumin   2.4 L   (3.5-5.0)  g/dL


 


Triglycerides   279 H   (<150)  mg/dL


 


Vitamin B12     (200.0-944.0)  pg/mL


 


Vitamin D 25-Hydroxy     (30.0-100.0)  ng/mL


 


Zinc     ()  ug/dL














  06/19/20 06/19/20 06/19/20 Range/Units





  06:34 06:35 12:10 


 


RDW     (11.5-15.5)  %


 


Neutrophils #     (1.3-7.7)  k/uL


 


ABG pH   7.51 H   (7.35-7.45)  


 


ABG pCO2   32 L   (35-45)  mmHg


 


ABG pO2   52 L*   ()  mmHg


 


ABG Total CO2   26 H   (19-24)  mmol/L


 


ABG O2 Saturation   88.6 L   (94-97)  %


 


Potassium     (3.5-5.1)  mmol/L


 


Chloride     ()  mmol/L


 


Creatinine     (0.52-1.04)  mg/dL


 


Glucose     (74-99)  mg/dL


 


POC Glucose (mg/dL)  184 H   151 H  (75-99)  mg/dL


 


Hemoglobin A1c     (4.0-6.0)  %


 


Calcium     (8.4-10.2)  mg/dL


 


Phosphorus     (2.5-4.5)  mg/dL


 


Iron     ()  ug/dL


 


% Saturation     (12.00-45.00)   


 


AST     (14-36)  U/L


 


Total Protein     (6.3-8.2)  g/dL


 


Albumin     (3.5-5.0)  g/dL


 


Triglycerides     (<150)  mg/dL


 


Vitamin B12     (200.0-944.0)  pg/mL


 


Vitamin D 25-Hydroxy     (30.0-100.0)  ng/mL


 


Zinc     ()  ug/dL








                      Microbiology - Last 24 Hours (Table)











 06/15/20 16:30 Blood Culture - Preliminary





 Blood    No Growth after 72 hours


 


 06/15/20 16:25 Blood Culture - Preliminary





 Blood    No Growth after 72 hours


 


 06/14/20 23:00 Anaerobic Culture - Preliminary





 Abdomen    Anaerobic Gm Negative Bacilli





    Anaerobic Gm Negative Bacilli#2





    Clostridium species


 


 06/14/20 23:00 Anaerobic Culture - Preliminary





 Abdomen    Anaerobic Gm Negative Bacilli





    Clostridium species














<Socorro Reed - Last Filed: 06/26/20 03:05>





Subjective





Patient seen and evaluated with above.  Additional recommendations include I 

have started patient on IV Synthroid as TSH level moderately elevated.  

Additionally, thiamine supplementation advised due to history of iatrogenic 

malnutrition with risk of thiamine deficiency.  Neurology consultation 

appreciated for guidance of encephalopathy.  Otherwise, patient still critical 

with face mask for BiPAP and oxygenation.  Continue ICU care.





Objective





- Vital Signs


Vital signs: 


                                   Vital Signs











Temp  98.1 F   06/26/20 00:00


 


Pulse  79   06/26/20 02:00


 


Resp  22   06/26/20 02:00


 


BP  95/74   06/25/20 20:00


 


Pulse Ox  96   06/26/20 02:00








                                 Intake & Output











 06/25/20 06/25/20 06/26/20





 06:59 18:59 06:59


 


Intake Total 1300 2538.2 876


 


Output Total 1815 2300 1300


 


Balance -515 238.2 -424


 


Weight 79 kg  


 


Intake:   


 


  IV 1300 1226 876


 


    Ampicillin-Sulbactam 3 gm 200 100 100





    In Sodium Chloride 0.9%   





    100 ml @ 200 mls/hr IVPB   





    Q6HR [facility] Rx#:[id number]   


 


    Fat Emulsion 20% 250 mL@  126 126





    20.833mls/hr   


 


    Mvi, Adult No.4 with Vit 660 660 





    K 10 ml Trace (Conc-1Ml/   





    Dose) 1 ml Potassium   





    Chloride 60 meq Potassium   





    Phosphate 30 mmol   





    Magnesium Sulfate gm 0.6   





    gm Calcium Gluconate 1 gm   





    In Amino Acid 5%-D15w 1,   





    000 ml @ 55 mls/hr IV .   





    G84Q31H [facility] Rx#:[id number]   


 


    Mvi, Adult No.4 with Vit   385





    K 10 ml Trace (Conc-1Ml/   





    Dose) 1 ml Potassium   





    Chloride 60 meq Potassium   





    Phosphate 30 mmol   





    Magnesium Sulfate gm 0.6   





    gm Calcium Gluconate 1 gm   





    In Amino Acid 5%-D15w 1,   





    000 ml @ 55 mls/hr IV .   





    [id number] [facility] Rx#:[id number]   


 


    Sodium Chloride 0.45% 1, 240 240 160





    000 ml @ 20 mls/hr IV .   





    Q24H [facility] Rx#:[id number]   


 


    Sodium Chloride 0.9% 1,00   5





    mL   


 


    metroNIDAZOLE-NS  200 100 100





    mg   


 


  Intake, IV Titration  1062.2 





  Amount   


 


    Mvi, Adult No.4 with Vit  1062.2 





    K 10 ml Trace (Conc-1Ml/   





    Dose) 1 ml Potassium   





    Chloride 60 meq Potassium   





    Phosphate 30 mmol   





    Magnesium Sulfate gm 0.6   





    gm Calcium Gluconate 1 gm   





    In Amino Acid 5%-D15w 1,   





    000 ml @ 55 mls/hr IV .   





    [id number] [facility] Rx#:[id number]   


 


  Oral  250 


 


Output:   


 


  Drainage 40  


 


    Right Lower Abdomen 40  


 


  Urine 1575 1450 1200


 


  Stool 200 850 100


 


Other:   


 


  Voiding Method Indwelling Catheter Indwelling Catheter Indwelling Catheter








                       ABP, PAP, CO, CI - Last Documented











Arterial Blood Pressure        89/47

















- Labs


CBC & Chem 7: 


                                 06/25/20 05:25





                                 06/25/20 05:25


Labs: 


                  Abnormal Lab Results - Last 24 Hours (Table)











  06/25/20 06/25/20 06/25/20 Range/Units





  05:25 05:25 17:33 


 


WBC   13.7 H   (3.8-10.6)  k/uL


 


RBC   3.79 L   (3.80-5.40)  m/uL


 


RDW   17.0 H   (11.5-15.5)  %


 


Neutrophils #   11.6 H   (1.3-7.7)  k/uL


 


Sodium  133 L    (137-145)  mmol/L


 


Creatinine  0.34 L    (0.52-1.04)  mg/dL


 


Glucose  114 H    (74-99)  mg/dL


 


POC Glucose (mg/dL)    101 H  (75-99)  mg/dL


 


Calcium  8.1 L    (8.4-10.2)  mg/dL








                      Microbiology - Last 24 Hours (Table)











 06/24/20 13:50 Blood Culture - Preliminary





 Blood    No Growth after 24 hours


 


 06/19/20 05:10 Blood Culture - Final





 Blood    No Growth after 144 hours














Assessment and Plan


(1) Peritonitis (acute) generalized


Current Visit: Yes   Status: Acute   Code(s): K65.0 - GENERALIZED (ACUTE) 

PERITONITIS   SNOMED Code(s): 79493226


   





(2) History of gastric bypass


Current Visit: Yes   Status: Acute   Code(s): Z98.84 - BARIATRIC SURGERY STATUS 

 SNOMED Code(s): 125925097


   





(3) Medical non-compliance


Current Visit: Yes   Status: Acute   Code(s): Z91.19 - PATIENT'S NONCOMPLIANCE W

OTH MEDICAL TREATMENT AND REGIMEN   SNOMED Code(s): 899957933


   





(4) Tobacco abuse


Current Visit: Yes   Status: Acute   Code(s): Z72.0 - TOBACCO USE   SNOMED 

Code(s): 463213035


   





(5) Tobacco abuse counseling


Current Visit: Yes   Status: Acute   Code(s): Z71.6 - TOBACCO ABUSE COUNSELING  

SNOMED Code(s): 100514776


   





(6) Depressive disorder


Current Visit: Yes   Status: Acute   Code(s): F32.9 - MAJOR DEPRESSIVE DISORDER,

SINGLE EPISODE, UNSPECIFIED   SNOMED Code(s): 43577298


   





(7) Hypothyroidism


Current Visit: Yes   Status: Acute   Code(s): E03.9 - HYPOTHYROIDISM, 

UNSPECIFIED   SNOMED Code(s): 50365447


   





(8) Abdominal pain


Current Visit: Yes   Status: Acute   Code(s): R10.9 - UNSPECIFIED ABDOMINAL PAIN

  SNOMED Code(s): 43863345


   





(9) Free intraperitoneal air


Current Visit: Yes   Status: Acute   Code(s): K66.8 - OTHER SPECIFIED DISORDERS 

OF PERITONEUM   SNOMED Code(s): 41058308


   





(10) Dehydration


Current Visit: Yes   Status: Acute   Code(s): E86.0 - DEHYDRATION   SNOMED 

Code(s): 37703679

## 2020-06-19 NOTE — XR
EXAMINATION TYPE: XR chest 1V portable

 

DATE OF EXAM: 6/19/2020

 

Comparison: 6/19/2020

 

Clinical History: 59-year-old female intubation/tube placement

 

Findings:

ET tube is satisfactory. NG tube tip just below the GE junction level. The tube can be further advanc
ed so that the sidehole into the stomach. Advancement by approximately 8 cm and reassessed at follow-
up. Partially visualized skin staples along the epigastrium. Right IJ CVC tip lower SVC. Heart upper 
limits of normal in size. Perihilar and confluent airspace opacity greatest in the mid and lower lung
s, slightly increased from prior. Small left effusion difficult to exclude.

 

 

Impression:

 

1. Satisfactory ET tube.

2. Advance the NG tube further by approximately 8 cm so that the side hole enters the stomach.

3. Increasing perihilar and confluent airspace disease greatest in the mid and lower lungs.

## 2020-06-19 NOTE — XR
EXAMINATION TYPE: XR chest 1V portable

 

DATE OF EXAM: 6/19/2020

 

HISTORY: Line placement

 

COMPARISON: 6/18/2020

 

TECHNIQUE: Single view of the chest is submitted.

 

FINDINGS:

Demonstrated are scattered senescent parenchymal change.  

 

Increasing perihilar and basilar infiltrates with pulmonary venous congestion. Bilateral peripheral e
dge lines persist and may reflect artifact or summation. Central venous line IJ approach is unchanged
.

 

The heart is stable.

 

Hilar and mediastinal structures are within normal limits.  

 

Degenerative changes are seen of the dorsal spine. 

 

 IMPRESSION: 

 

1.  Increasing perihilar and basilar infiltrates with pulmonary venous congestion. Bilateral peripher
al edge lines persist and may reflect artifact or summation. Central venous line IJ approach is uncha
nged.

## 2020-06-19 NOTE — P.PN
Subjective


Progress Note Date: 06/19/20 06/19/2020: Patient continues to be encephalopathic, moaning, groaning.  However

she is following commands as per examination.  Patient has received 4 mg of 

Haldol last night, as she became agitated, was pulling of lines, restless 

tachypneic.  She has received 0.5 mg of Dilaudid at 8:30 AM today.  Patient's 

oxygen saturation has been dropping, and on ABG, her saturation was 52%, pH 7.51

and pCO2 32.  Patient also started on BiPAP.





06/18/2020: Patient was seen for a follow-up.  Patient apparently was showing 

clinical improvement this morning, with some response in talking few words.  

Patient however was in pain.  She was given 0.5 mg Dilaudid at 8:30 PM last 

night, then there are 0.5 mg Dilaudid at 3:30 AM and then 11 AM this morning 

also.  She also received 4 mg of Haldol for restlessness and/any agitation at 

9:45 AM.  Patient subsequently became more groggy, less responsive.  At present 

patient is just moaning, speaks "OK", or "all right".  Patient continues to be 

encephalopathic.  





Objective





- Vital Signs


Vital signs: 


                                   Vital Signs











Temp  97.4 F L  06/19/20 08:00


 


Pulse  87   06/19/20 10:00


 


Resp  16   06/19/20 10:00


 


BP  98/82   06/19/20 10:00


 


Pulse Ox  100   06/19/20 10:00








                                 Intake & Output











 06/18/20 06/19/20 06/19/20





 18:59 06:59 18:59


 


Intake Total 2430 1300 340


 


Output Total 1870 1485 520


 


Balance 560 -185 -180


 


Weight 66.2 kg  


 


Intake:   


 


  IV 1400 1300 340


 


    Acetaminophen 1000mg  100 


 


    Pipercillin/ Tazobactam 3 100 100 





    .375 g   


 


    Potassium Chloride 20 meq   100





    In Water For Injection 1   





    100ml.bag @ 50 mls/hr   





    IVPB Q2H ABIGAIL Rx#:   





    [id number]   


 


    Sodium Chloride 0.45% 1, 1200 1100 140





    000 ml @ 20 mls/hr IV .   





    Q24H ABIGAIL Rx#:[id number]   


 


    Sodium Ferric Gluconat-   100





    Sucrose 125 mg In Sodium   





    Chloride 0.9% 100 ml @   





    100 mls/hr IVPB DAILY ABIGAIL   





    Rx#:[id number]   


 


    metroNIDAZOLE-NS  100  





    mg   


 


  Intake, IV Titration 1030  





  Amount   


 


    Potassium Chloride 20 meq 500  





    In Water For Injection 1   





    100ml.bag @ 50 mls/hr   





    IVPB ONCE ONE Rx#:   





    [id number]   


 


    Potassium Phosphate 10 200  





    mmol In Sodium Chloride 0   





    .9% 100 ml @ 50 mls/hr IV   





    Q2H ABIGAIL Rx#:[id number]   


 


    Sodium Acetate 30 meq 330  





    Potassium Chloride 20 meq   





    Potassium Acetate 20 meq   





    Calcium Gluconate 1 gm   





    Mvi, Adult No.4 with Vit   





    K 10 ml Trace (Conc-1Ml/   





    Dose) 1 ml In Amino Acid   





    5%-D15w 1,000 ml @ 30 mls   





    /hr IV .Q24H ONE Rx#:   





    [id number]   


 


Output:   


 


  Drainage 125 50 50


 


    Right Lower Abdomen 125 50 50


 


  Urine 1745 1435 470


 


Other:   


 


  Voiding Method Indwelling Catheter Indwelling Catheter Indwelling Catheter








                       ABP, PAP, CO, CI - Last Documented











Arterial Blood Pressure        124/102

















- Exam





Patient is encephalopathic, moaning and groaning, sometimes is okay.  Patient 

has very normal strength of her .  Patient wiggles her feet and toes very 

normally.  No focality.  Patient has BiPAP on.  





- Labs


CBC & Chem 7: 


                                 06/19/20 05:10





                                 06/19/20 05:10


Labs: 


                  Abnormal Lab Results - Last 24 Hours (Table)











  06/18/20 06/18/20 06/18/20 Range/Units





  11:39 11:39 15:01 


 


RDW     (11.5-15.5)  %


 


Neutrophils #     (1.3-7.7)  k/uL


 


ABG pH     (7.35-7.45)  


 


ABG pCO2     (35-45)  mmHg


 


ABG pO2     ()  mmHg


 


ABG Total CO2     (19-24)  mmol/L


 


ABG O2 Saturation     (94-97)  %


 


Potassium    3.3 L  (3.5-5.1)  mmol/L


 


Chloride     ()  mmol/L


 


Creatinine     (0.52-1.04)  mg/dL


 


Glucose     (74-99)  mg/dL


 


POC Glucose (mg/dL)     (75-99)  mg/dL


 


Hemoglobin A1c  6.7 H    (4.0-6.0)  %


 


Calcium     (8.4-10.2)  mg/dL


 


Phosphorus     (2.5-4.5)  mg/dL


 


Iron   7 L   ()  ug/dL


 


% Saturation   2.99 L   (12.00-45.00)   


 


AST     (14-36)  U/L


 


Total Protein     (6.3-8.2)  g/dL


 


Albumin     (3.5-5.0)  g/dL


 


Triglycerides     (<150)  mg/dL


 


Vitamin B12   1457.0 H   (200.0-944.0)  pg/mL


 


Vitamin D 25-Hydroxy   24.6 L   (30.0-100.0)  ng/mL


 


TSH   6.860 H   (0.465-4.680)  mIU/L


 


Free T4   0.57 L   (0.78-2.19)  ng/dL














  06/18/20 06/18/20 06/18/20 Range/Units





  17:55 21:38 23:58 


 


RDW     (11.5-15.5)  %


 


Neutrophils #     (1.3-7.7)  k/uL


 


ABG pH     (7.35-7.45)  


 


ABG pCO2     (35-45)  mmHg


 


ABG pO2     ()  mmHg


 


ABG Total CO2     (19-24)  mmol/L


 


ABG O2 Saturation     (94-97)  %


 


Potassium   3.3 L   (3.5-5.1)  mmol/L


 


Chloride     ()  mmol/L


 


Creatinine     (0.52-1.04)  mg/dL


 


Glucose     (74-99)  mg/dL


 


POC Glucose (mg/dL)  124 H   135 H  (75-99)  mg/dL


 


Hemoglobin A1c     (4.0-6.0)  %


 


Calcium     (8.4-10.2)  mg/dL


 


Phosphorus   1.2 L   (2.5-4.5)  mg/dL


 


Iron     ()  ug/dL


 


% Saturation     (12.00-45.00)   


 


AST     (14-36)  U/L


 


Total Protein     (6.3-8.2)  g/dL


 


Albumin     (3.5-5.0)  g/dL


 


Triglycerides     (<150)  mg/dL


 


Vitamin B12     (200.0-944.0)  pg/mL


 


Vitamin D 25-Hydroxy     (30.0-100.0)  ng/mL


 


TSH     (0.465-4.680)  mIU/L


 


Free T4     (0.78-2.19)  ng/dL














  06/19/20 06/19/20 06/19/20 Range/Units





  05:10 05:10 06:34 


 


RDW   17.0 H   (11.5-15.5)  %


 


Neutrophils #   7.9 H   (1.3-7.7)  k/uL


 


ABG pH     (7.35-7.45)  


 


ABG pCO2     (35-45)  mmHg


 


ABG pO2     ()  mmHg


 


ABG Total CO2     (19-24)  mmol/L


 


ABG O2 Saturation     (94-97)  %


 


Potassium     (3.5-5.1)  mmol/L


 


Chloride  113 H    ()  mmol/L


 


Creatinine  0.34 L    (0.52-1.04)  mg/dL


 


Glucose  164 H    (74-99)  mg/dL


 


POC Glucose (mg/dL)    184 H  (75-99)  mg/dL


 


Hemoglobin A1c     (4.0-6.0)  %


 


Calcium  7.9 L    (8.4-10.2)  mg/dL


 


Phosphorus     (2.5-4.5)  mg/dL


 


Iron     ()  ug/dL


 


% Saturation     (12.00-45.00)   


 


AST  62 H    (14-36)  U/L


 


Total Protein  4.8 L    (6.3-8.2)  g/dL


 


Albumin  2.4 L    (3.5-5.0)  g/dL


 


Triglycerides  279 H    (<150)  mg/dL


 


Vitamin B12     (200.0-944.0)  pg/mL


 


Vitamin D 25-Hydroxy     (30.0-100.0)  ng/mL


 


TSH     (0.465-4.680)  mIU/L


 


Free T4     (0.78-2.19)  ng/dL














  06/19/20 Range/Units





  06:35 


 


RDW   (11.5-15.5)  %


 


Neutrophils #   (1.3-7.7)  k/uL


 


ABG pH  7.51 H  (7.35-7.45)  


 


ABG pCO2  32 L  (35-45)  mmHg


 


ABG pO2  52 L*  ()  mmHg


 


ABG Total CO2  26 H  (19-24)  mmol/L


 


ABG O2 Saturation  88.6 L  (94-97)  %


 


Potassium   (3.5-5.1)  mmol/L


 


Chloride   ()  mmol/L


 


Creatinine   (0.52-1.04)  mg/dL


 


Glucose   (74-99)  mg/dL


 


POC Glucose (mg/dL)   (75-99)  mg/dL


 


Hemoglobin A1c   (4.0-6.0)  %


 


Calcium   (8.4-10.2)  mg/dL


 


Phosphorus   (2.5-4.5)  mg/dL


 


Iron   ()  ug/dL


 


% Saturation   (12.00-45.00)   


 


AST   (14-36)  U/L


 


Total Protein   (6.3-8.2)  g/dL


 


Albumin   (3.5-5.0)  g/dL


 


Triglycerides   (<150)  mg/dL


 


Vitamin B12   (200.0-944.0)  pg/mL


 


Vitamin D 25-Hydroxy   (30.0-100.0)  ng/mL


 


TSH   (0.465-4.680)  mIU/L


 


Free T4   (0.78-2.19)  ng/dL








                      Microbiology - Last 24 Hours (Table)











 06/15/20 16:30 Blood Culture - Preliminary





 Blood    No Growth after 72 hours


 


 06/15/20 16:25 Blood Culture - Preliminary





 Blood    No Growth after 72 hours


 


 06/14/20 23:00 Anaerobic Culture - Preliminary





 Abdomen    Anaerobic Gm Negative Bacilli





    Anaerobic Gm Negative Bacilli#2





    Clostridium species


 


 06/14/20 23:00 Anaerobic Culture - Preliminary





 Abdomen    Anaerobic Gm Negative Bacilli





    Clostridium species














Assessment and Plan


Assessment: 





* Altered mental status, likely related to toxic metabolic encephalopathy.  

  Etiology multifactorial, as mentioned below.  Patient still receiving 

  Dilaudid, and Haldol, which may be contributing to persistent encephalopathy. 

  


* Acute kidney injury, secondary to hypotension, improving.


* Acute abdomen with perforated bowel, status post exploratory laparotomy, 

  sigmoid colectomy and colostomy.


* Status post hypoxic acute respiratory failure, status post extubation.  

  Patient still hypoxic at times, with O2 saturation 52% on ABG.


* Sepsis related to intra-abdominal source and bowel perforation with fecal 

  peritonitis.


Plan: 





* Avoid sedatives, hypnotics.


* B12 is normal 1457, folate 9.0, TSH is elevated 6.86, with low free T4 0.57.  

  Patient started on Synthroid.


* Patient's mentation appears intact, as she follows commands very normally.


* Your medical management.


* Neurology coverage not available on the weekend.

## 2020-06-19 NOTE — PN
PROGRESS NOTE



Patient is seen for followup for acute kidney injury and hypokalemia.  Patient's

potassium has improved and staying at 3.9.  However, she developed worsening shortness

of breath and received a dose of IV Lasix this morning.  She has good urine output at

about 50 to 150 mL/hour.  The patient was placed on BiPAP this morning.



PHYSICAL EXAMINATION:

On examination, blood pressure is 142/110, heart rate 120 per minute. She is afebrile.

EXAMINATION OF THE HEART: S1 and S2.

EXAMINATION OF LUNGS: Bilateral breath sounds are heard. Decreased breath sounds at the

bases.

ABDOMEN:  Soft.

Examination of lower extremities shows no significant edema.

CNS exam cannot be performed.



LABS:

Labs show sodium of 142, potassium 3.9, chloride 113. CO2 is 25, BUN 16, creatinine

0.34.



ASSESSMENT:

1. Acute kidney injury associated with hypotension, hypoperfusion, currently resolved.

2. Hypokalemia from diuretics, now resolved.

3. Acute abdomen, status post explorative laparotomy, bowel resection, sigmoid

    colectomy and colostomy.

4. Altered mentation, not significantly improved.

5. Volume overload, with chest x-ray from today showing increased pulmonary vascular

    congestion.



PLAN:

Maintain IV Lasix at 40 mg q.12 hours.  I will give the second dose in 8 hours, as

patient is currently on BiPAP and hypoxic.  Monitor potassium closely and repeat labs

in a.m.





MMBRET / MELA: [id number] / Job#: [id number]

## 2020-06-19 NOTE — PN
PROGRESS NOTE



DATE OF SERVICE:

06/19/2020



REASON FOR FOLLOWUP:

Ruptured sigmoid diverticulitis and abdominal abscess.



INTERVAL HISTORY:

Patient was seen on rounds early this afternoon.  The patient has been afebrile.  The

patient is still moaning, hemodynamically stable.  No vomiting or any diarrhea or any

other changes reported by the nursing staff.



PHYSICAL EXAMINATION:

Blood pressure is 119/77 with a pulse of 93, temperature of 98.4. She is 95% on

ventilator.

General description is a middle-aged female lying in bed in no distress.

RESPIRATORY SYSTEM: Unlabored breathing with decreased breath sounds at the base. No

wheeze.

HEART: S1, S2.  Regular rate and rhythm.

ABDOMEN: Soft. Mild tenderness. No guarding or rigidity.



LABS:

Hemoglobin is 12.9, white count 9.6, BUN of 16, creatinine 0.34.



DIAGNOSTIC IMPRESSION AND PLAN:

Patient with abdominal abscess from ruptured sigmoid diverticulitis in this patient who

did have significant abdominal surgery.  Abdominal culture has been Clostridium in

addition to multiple other anaerobes.  Patient is covered with Zosyn, to which the

patient has clinically responded.  Her white count normalized and fever responded.  We

will continue Zosyn and monitor her clinical course closely.  Continue with supportive

care.





MMODL / IJN: [id number] / Job#: [id number]

## 2020-06-20 LAB
ALBUMIN SERPL-MCNC: 2.1 G/DL (ref 3.5–5)
ALP SERPL-CCNC: 82 U/L (ref 38–126)
ALT SERPL-CCNC: 27 U/L (ref 4–34)
ANION GAP SERPL CALC-SCNC: 2 MMOL/L
AST SERPL-CCNC: 39 U/L (ref 14–36)
BUN SERPL-SCNC: 23 MG/DL (ref 7–17)
CALCIUM SPEC-MCNC: 7.6 MG/DL (ref 8.4–10.2)
CELLS COUNTED: 100
CHLORIDE SERPL-SCNC: 113 MMOL/L (ref 98–107)
CK SERPL-CCNC: 96 U/L (ref 30–135)
CO2 BLDA-SCNC: 33 MMOL/L (ref 19–24)
CO2 SERPL-SCNC: 29 MMOL/L (ref 22–30)
ERYTHROCYTE [DISTWIDTH] IN BLOOD BY AUTOMATED COUNT: 3.79 M/UL (ref 3.8–5.4)
ERYTHROCYTE [DISTWIDTH] IN BLOOD: 16.9 % (ref 11.5–15.5)
GLUCOSE BLD-MCNC: 120 MG/DL (ref 75–99)
GLUCOSE BLD-MCNC: 127 MG/DL (ref 75–99)
GLUCOSE BLD-MCNC: 143 MG/DL (ref 75–99)
GLUCOSE BLD-MCNC: 172 MG/DL (ref 75–99)
GLUCOSE SERPL-MCNC: 127 MG/DL (ref 74–99)
HCO3 BLDA-SCNC: 32 MMOL/L (ref 21–25)
HCT VFR BLD AUTO: 36.7 % (ref 34–46)
HGB BLD-MCNC: 11.5 GM/DL (ref 11.4–16)
LYMPHOCYTES # BLD MANUAL: 2.38 K/UL (ref 1–4.8)
MAGNESIUM SPEC-SCNC: 2.1 MG/DL (ref 1.6–2.3)
MCH RBC QN AUTO: 30.5 PG (ref 25–35)
MCHC RBC AUTO-ENTMCNC: 31.4 G/DL (ref 31–37)
MCV RBC AUTO: 97 FL (ref 80–100)
MONOCYTES # BLD MANUAL: 0.6 K/UL (ref 0–1)
NEUTROPHILS NFR BLD MANUAL: 63 %
NEUTS SEG # BLD MANUAL: 5.5 K/UL (ref 1.3–7.7)
PCO2 BLDA: 49 MMHG (ref 35–45)
PH BLDA: 7.42 [PH] (ref 7.35–7.45)
PLATELET # BLD AUTO: 174 K/UL (ref 150–450)
PO2 BLDA: 84 MMHG (ref 83–108)
POTASSIUM SERPL-SCNC: 3.9 MMOL/L (ref 3.5–5.1)
PROT SERPL-MCNC: 4.4 G/DL (ref 6.3–8.2)
SODIUM SERPL-SCNC: 144 MMOL/L (ref 137–145)
TROPONIN I SERPL-MCNC: 0.28 NG/ML (ref 0–0.03)
WBC # BLD AUTO: 8.5 K/UL (ref 3.8–10.6)

## 2020-06-20 RX ADMIN — FUROSEMIDE SCH MG: 10 INJECTION, SOLUTION INTRAMUSCULAR; INTRAVENOUS at 23:16

## 2020-06-20 RX ADMIN — FUROSEMIDE SCH MG: 10 INJECTION, SOLUTION INTRAMUSCULAR; INTRAVENOUS at 07:57

## 2020-06-20 RX ADMIN — DEXTRAN 70 AND HYPROMELLOSE 2910 SCH DROPS: 1; 3 SOLUTION/ DROPS OPHTHALMIC at 03:59

## 2020-06-20 RX ADMIN — FUROSEMIDE SCH MG: 10 INJECTION, SOLUTION INTRAMUSCULAR; INTRAVENOUS at 17:44

## 2020-06-20 RX ADMIN — HEPARIN SODIUM SCH UNIT: 5000 INJECTION, SOLUTION INTRAVENOUS; SUBCUTANEOUS at 20:05

## 2020-06-20 RX ADMIN — SODIUM CHLORIDE SCH MG: 900 INJECTION, SOLUTION INTRAVENOUS at 20:05

## 2020-06-20 RX ADMIN — DEXTRAN 70 AND HYPROMELLOSE 2910 SCH DROPS: 1; 3 SOLUTION/ DROPS OPHTHALMIC at 07:58

## 2020-06-20 RX ADMIN — METRONIDAZOLE SCH MLS/HR: 500 INJECTION, SOLUTION INTRAVENOUS at 17:17

## 2020-06-20 RX ADMIN — PANTOPRAZOLE SODIUM SCH MG: 40 INJECTION, POWDER, FOR SOLUTION INTRAVENOUS at 07:59

## 2020-06-20 RX ADMIN — PROPOFOL SCH MLS/HR: 10 INJECTION, EMULSION INTRAVENOUS at 02:17

## 2020-06-20 RX ADMIN — INSULIN ASPART SCH: 100 INJECTION, SOLUTION INTRAVENOUS; SUBCUTANEOUS at 11:38

## 2020-06-20 RX ADMIN — DEXTRAN 70 AND HYPROMELLOSE 2910 SCH DROPS: 1; 3 SOLUTION/ DROPS OPHTHALMIC at 15:58

## 2020-06-20 RX ADMIN — CHLORHEXIDINE GLUCONATE SCH ML: 1.2 RINSE ORAL at 20:05

## 2020-06-20 RX ADMIN — METRONIDAZOLE SCH MLS/HR: 500 INJECTION, SOLUTION INTRAVENOUS at 11:39

## 2020-06-20 RX ADMIN — HEPARIN SODIUM SCH UNIT: 5000 INJECTION, SOLUTION INTRAVENOUS; SUBCUTANEOUS at 07:58

## 2020-06-20 RX ADMIN — SOYBEAN OIL SCH: 20 INJECTION, SOLUTION INTRAVENOUS at 11:31

## 2020-06-20 RX ADMIN — DEXTRAN 70 AND HYPROMELLOSE 2910 SCH DROPS: 1; 3 SOLUTION/ DROPS OPHTHALMIC at 11:40

## 2020-06-20 RX ADMIN — PIPERACILLIN AND TAZOBACTAM SCH MLS/HR: 3; .375 INJECTION, POWDER, FOR SOLUTION INTRAVENOUS at 03:55

## 2020-06-20 RX ADMIN — METRONIDAZOLE SCH MLS/HR: 500 INJECTION, SOLUTION INTRAVENOUS at 07:21

## 2020-06-20 RX ADMIN — SPIRONOLACTONE SCH MG: 25 TABLET, FILM COATED ORAL at 07:59

## 2020-06-20 RX ADMIN — SODIUM BICARBONATE SCH: 84 INJECTION, SOLUTION INTRAVENOUS at 15:58

## 2020-06-20 RX ADMIN — PROPOFOL SCH MLS/HR: 10 INJECTION, EMULSION INTRAVENOUS at 13:53

## 2020-06-20 RX ADMIN — PROPOFOL SCH MLS/HR: 10 INJECTION, EMULSION INTRAVENOUS at 23:52

## 2020-06-20 RX ADMIN — SODIUM CHLORIDE SCH MG: 900 INJECTION, SOLUTION INTRAVENOUS at 07:59

## 2020-06-20 RX ADMIN — METRONIDAZOLE SCH MLS/HR: 500 INJECTION, SOLUTION INTRAVENOUS at 00:27

## 2020-06-20 RX ADMIN — INSULIN ASPART SCH: 100 INJECTION, SOLUTION INTRAVENOUS; SUBCUTANEOUS at 06:19

## 2020-06-20 RX ADMIN — LEVOTHYROXINE SODIUM ANHYDROUS SCH MCG: 100 INJECTION, POWDER, LYOPHILIZED, FOR SOLUTION INTRAVENOUS at 07:57

## 2020-06-20 RX ADMIN — HYDROMORPHONE HYDROCHLORIDE PRN MG: 1 INJECTION, SOLUTION INTRAMUSCULAR; INTRAVENOUS; SUBCUTANEOUS at 20:08

## 2020-06-20 RX ADMIN — NOREPINEPHRINE BITARTRATE SCH MLS/HR: 1 INJECTION, SOLUTION, CONCENTRATE INTRAVENOUS at 20:21

## 2020-06-20 RX ADMIN — PROPOFOL SCH MLS/HR: 10 INJECTION, EMULSION INTRAVENOUS at 15:55

## 2020-06-20 RX ADMIN — METOPROLOL TARTRATE SCH MG: 1 INJECTION, SOLUTION INTRAVENOUS at 07:58

## 2020-06-20 RX ADMIN — INSULIN ASPART SCH UNIT: 100 INJECTION, SOLUTION INTRAVENOUS; SUBCUTANEOUS at 17:17

## 2020-06-20 RX ADMIN — INSULIN ASPART SCH UNIT: 100 INJECTION, SOLUTION INTRAVENOUS; SUBCUTANEOUS at 00:25

## 2020-06-20 RX ADMIN — CARVEDILOL SCH MG: 3.12 TABLET, FILM COATED ORAL at 17:19

## 2020-06-20 RX ADMIN — SODIUM FERRIC GLUCONATE COMPLEX SCH MLS/HR: 12.5 INJECTION INTRAVENOUS at 08:20

## 2020-06-20 RX ADMIN — PROPOFOL SCH MLS/HR: 10 INJECTION, EMULSION INTRAVENOUS at 07:53

## 2020-06-20 RX ADMIN — CHLORHEXIDINE GLUCONATE SCH ML: 1.2 RINSE ORAL at 07:57

## 2020-06-20 RX ADMIN — PROPOFOL SCH MLS/HR: 10 INJECTION, EMULSION INTRAVENOUS at 18:41

## 2020-06-20 RX ADMIN — PIPERACILLIN AND TAZOBACTAM SCH MLS/HR: 3; .375 INJECTION, POWDER, FOR SOLUTION INTRAVENOUS at 11:39

## 2020-06-20 RX ADMIN — LEUCINE, PHENYLALANINE, LYSINE, METHIONINE, ISOLEUCINE, VALINE, HISTIDINE, THREONINE, TRYPTOPHAN, ALANINE, GLYCINE, ARGININE, PROLINE, SERINE, TYROSINE, SODIUM ACETATE, DIBASIC POTASSIUM PHOSPHATE, MAGNESIUM CHLORIDE, SODIUM CHLORIDE, CALCIUM CHLORIDE, DEXTROSE SCH
365; 280; 290; 200; 300; 290; 240; 210; 90; 1035; 515; 575; 340; 250; 20; 340; 261; 51; 59; 33; 15 INJECTION INTRAVENOUS at 17:43

## 2020-06-20 RX ADMIN — NOREPINEPHRINE BITARTRATE SCH: 1 INJECTION, SOLUTION, CONCENTRATE INTRAVENOUS at 07:52

## 2020-06-20 RX ADMIN — PIPERACILLIN AND TAZOBACTAM SCH MLS/HR: 3; .375 INJECTION, POWDER, FOR SOLUTION INTRAVENOUS at 20:05

## 2020-06-20 NOTE — PN
PROGRESS NOTE



PULMONARY/CRITICAL PROGRESS NOTE:



DATE OF SERVICE:

June 20, 2020



Critical care time: 34 minutes.



INTERVAL HISTORY:

This is a 59-year-old female seen 5 days ago in consultation.  She is postop day 6,

status post exploratory laparotomy with sigmoid colectomy for perforated sigmoid colon,

Roselia's procedure, disimpaction of descending colon, open appendectomy, abdominal

washout for fecal peritonitis with 6 L of saline, placement of a Jimenez-Rincon drain,

and application of incisional wound VAC system.  The patient was initially intubated.

She was extubated on June 17.  Unfortunately, yesterday, because of increasing

respiratory rate and respiratory insufficiency and demise, she needed to be reintubated

on the 19th.  Currently, she is on the volume assist-control mode rate of 20, tidal

volume 350, FiO2 70% to be dropped to 60%, PEEP of 5 to be increased to 10.  Blood

gases show pO2 of 84, pCO2 of 49, and pH 7.42.  She is getting half-normal saline at 20

mL an hour.  TPN at 30 mL an hour, propofol 40 mcg/kg per minute and Nimbex at 2 mcg/kg

per minute.  I told the nurses to turn the Nimbex off.  We are going to do an EEG and

CT scan of the head.  It will be done without contrast.  We are going to discontinue

the Nimbex as mentioned.  After the initial extubation, on the 17th, the patient's

mental status was poor and we had Neurology evaluate the patient.  At that time they

did not recommend any EEG or CT scan.  We are going to go ahead and do one now.



PHYSICAL EXAMINATION:

VITAL SIGNS: Current vital signs are reviewed.  Temperature is 98.9. Heart rate 98,

respiratory rate 20. Blood pressure 96/60, saturations 99%.

GENERAL: Appears in no acute distress HEENT: Examination is grossly unremarkable.

There is an orally placed NG tube and endotracheal tube.

NECK:  Supple full range of motion.  No adenopathy.  Neck veins are flat.

CARDIOVASCULAR: Examination reveals regular rhythm rate.  Heart rate 90 beats per

minute.  S1, S2 normal.

LUNGS: Reveal diffuse coarse rhonchi.  Breath sounds equal.  No crackles or wheezes.

ABDOMEN:  Soft.  No bowel sounds.

EXTREMITIES are intact.  Minimal to no edema.

SKIN: Without rash.

NEUROLOGIC: Examination could not be adequately assessed given the fact she is on

propofol and Nimbex.



LABS:

Reviewed.  White count 8.5, hemoglobin 11.5, hematocrit 36.7, platelet count 174,000,

blood gases have been noted thus far.  Sodium 144, potassium 3.9, chloride 113, CO2 29,

anion gap 2, BUN and creatinine were 23 and 0.41.  Phosphorus is 2.9, calcium 7.6,

albumin 2.1.



Microbiology is reviewed.  Everything on the 14th was from the abdominal cultures.  She

had clostridium perfringens as well as 2 different anaerobic gram-negative bacilli.  A

chest x-ray today shows improved aeration of both lungs.  She has bibasilar airspace

disease.  This could represent fluid, atelectasis or pneumonia.

A brain CT was done but the results are currently pending.



MEDICATIONS:

Reviewed.  They all seem appropriate.  ID is on and managing the patient's antibiotics.



ASSESSMENT:

1. Postoperative day #6, status post exploratory laparotomy with sigmoid colectomy for

    perforated sigmoid colon, Roselia's procedure, disimpaction of descending colon,

    open appendectomy, abdominal washout for fecal peritonitis, placement of a Jimenez-

    Rincon drain, and application of an incisional wound VAC system.

2. Worsening oxygenation, which may be multifactorial in part related to bibasilar

    pneumonia, early acute lung injury/ARDS and just plain fluid overload (cardiogenic

    pulmonary edema).

3. Successful extubation on June 17 with need for re-intubation because of impending

    respiratory failure, on June 19.

4. History of hypothyroidism.

5. History of ongoing tobacco use and nicotine addiction.

6. Mental status changes, likely related to septic encephalopathy.



PLAN:

The patient will go ahead and have a CT scan of the brain and EEG today.  We will get

her off the Nimbex.  Additional recommendations and suggestions forthcoming.  Overall

prognosis remains very guarded.



Critical care time: 34 minutes.





MMODL / IJN: [id number] / Job#: [id number]

## 2020-06-20 NOTE — PN
PROGRESS NOTE



DATE OF SERVICE:

06/20/2020



REASON FOR FOLLOWUP:

Secondary peritonitis from perforated sigmoid diverticulitis.



INTERVAL HISTORY:

Patient did go into respiratory distress last night and ended up getting intubated.

The patient did require pressor support for a short time last night.  However, is off

the pressor support.  The patient's FiO2 is currently 50%.  She did have some frothy

secretion yesterday at the time of intubation.  None since then and no other changes

have been reported by the nursing staff.



PHYSICAL EXAMINATION:

Blood pressure 100/63 with a pulse of 93, temperature 98.2.  She is 95% on 50% FiO2.

General description is an elderly female intubated on the vent.

Respiratory system: Unlabored breathing.  Clear to auscultation anteriorly.

HEART: S1, S2.  Regular rate and rhythm.

ABDOMEN:  Soft, no guarding, no rigidity.



LABS:

Hemoglobin is 11.5, white count 8.5, creatinine 0.41.



DIAGNOSTIC IMPRESSION AND PLAN:

Patient with abdominal abscess from perforated sigmoid diverticulitis status post

laparotomy, repair of such.  Culture positive for multiple pathogen.  Patient is

currently covered with Zosyn to continue and monitor clinical course closely.  Continue

supportive care.





MMODL / IJN: [id number] / Job#: [id number]

## 2020-06-20 NOTE — CT
EXAMINATION TYPE: CT brain wo con

 

DATE OF EXAM: 6/20/2020

 

COMPARISON: NONE

 

HISTORY: neuro deficit

 

CT DLP: 1040.4 mGycm

Automated exposure control for dose reduction was used.

 

FINDINGS: 

Central structures are midline. There is no evidence hydrocephalus. No acute focal lesion, mass effec
t or midline shift is seen. I do not see evidence of intracranial blood.

 

Visualized portions of the paranasal sinuses and mastoids are clear. The bony calvarium is intact.

 

IMPRESSION: 

 

NO ACUTE INTRACRANIAL ABNORMALITY.

## 2020-06-20 NOTE — P.PN
Subjective


Progress Note Date: 06/20/20


Principal diagnosis: 





Colonic perforation





Patient was intubated because of tachypnea and hypoxia.  Remains on ventilator. 

Some EKG changes and changes on her echo as well.  White blood cell count 8.5, 

hemoglobin 11.5.  TPN was increased further.  Small amount of liquid stool thr

ough ostomy.





Objective





- Vital Signs


Vital signs: 


                                   Vital Signs











Temp  97.8 F   06/20/20 08:00


 


Pulse  101 H  06/20/20 11:00


 


Resp  26 H  06/20/20 11:00


 


BP  78/55   06/19/20 18:00


 


Pulse Ox  96   06/20/20 11:00








                                 Intake & Output











 06/19/20 06/20/20 06/20/20





 18:59 06:59 18:59


 


Intake Total 1998.937 977.785 836.022


 


Output Total 3440 800 985


 


Balance -1441.063 177.785 -148.978


 


Weight   66.2 kg


 


Intake:   


 


  IV 1990 800 550


 


    Mvi, Adult No.4 with Vit 270 360 150





    K 10 ml Trace (Conc-1Ml/   





    Dose) 1 ml Potassium   





    Chloride 60 meq Potassium   





    Phosphate 20 mmol In   





    Amino Acid 5%-D15w+Lytes*   





    E* 1,000 ml @ 30 mls/hr   





    IV .Q24H [facility] Rx#:   





    [id number]   


 


    Pipercillin/ Tazobactam 3  100 





    .375 g   


 


    Potassium Chloride 20 meq 200 100 





    In Water For Injection 1   





    100ml.bag @ 50 mls/hr   





    IVPB Q2H [facility] Rx#:   





    [id number]   


 


    Potassium Chloride 20 meq   100





    In Water For Injection 1   





    100ml.bag @ 50 mls/hr   





    IVPB Q2H [facility] Rx#:   





    [id number]   


 


    Sodium Chloride 0.45% 1, 320 240 100





    000 ml @ 20 mls/hr IV .   





    Q24H [facility] Rx#:[id number]   


 


    Sodium Chloride 0.9% 1, 1000  





    000 ml @ 999 mls/hr IV .   





    Q1H1M ONE Rx#:[id number]   


 


    Sodium Ferric Gluconat- 100  100





    Sucrose 125 mg In Sodium   





    Chloride 0.9% 100 ml @   





    100 mls/hr IVPB DAILY [facility]   





    Rx#:[id number]   


 


    metroNIDAZOLE-NS  100  100





    mg   


 


  Intake, IV Titration 8.937 177.785 286.022





  Amount   


 


    Cisatracurium 200 mg In   121.676





    Sodium Chloride 0.9% 180   





    ml @ 2 MCG/KG/MIN 7.944   





    mls/hr IV .Q24H [facility] Rx#:   





    [id number]   


 


    Propofol 1,000 mg In 8.937 177.785 164.346





    Empty Bag 1 bag @ Titrate   





    IV .Q0M [facility] Rx#:   





    [id number]   


 


Output:   


 


  Drainage 120 60 


 


    Right Lower Abdomen 120 60 


 


  Urine 3320 740 985


 


Other:   


 


  Voiding Method Indwelling Catheter Indwelling Catheter 








                       ABP, PAP, CO, CI - Last Documented











Arterial Blood Pressure        107/73

















- Exam





Abdomen: Soft, nondistended, dressing intact, ostomy pink with small amount of 

liquid stool





- Labs


CBC & Chem 7: 


                                 06/20/20 04:05





                                 06/20/20 04:05


Labs: 


                  Abnormal Lab Results - Last 24 Hours (Table)











  06/19/20 06/19/20 06/19/20 Range/Units





  16:42 17:01 18:32 


 


RBC     (3.80-5.40)  m/uL


 


RDW     (11.5-15.5)  %


 


ABG pH  7.57 H*   7.51 H  (7.35-7.45)  


 


ABG pCO2  32 L    (35-45)  mmHg


 


ABG pO2  53 L*   131 H  ()  mmHg


 


ABG HCO3  29 H   29 H  (21-25)  mmol/L


 


ABG Total CO2  30 H   30 H  (19-24)  mmol/L


 


ABG O2 Saturation  90.0 L   98.6 H  (94-97)  %


 


Chloride     ()  mmol/L


 


BUN     (7-17)  mg/dL


 


Creatinine     (0.52-1.04)  mg/dL


 


Glucose     (74-99)  mg/dL


 


POC Glucose (mg/dL)   169 H   (75-99)  mg/dL


 


Calcium     (8.4-10.2)  mg/dL


 


AST     (14-36)  U/L


 


CK-MB (CK-2)     (0.0-2.4)  ng/mL


 


Troponin I     (0.000-0.034)  ng/mL


 


Total Protein     (6.3-8.2)  g/dL


 


Albumin     (3.5-5.0)  g/dL














  06/20/20 06/20/20 06/20/20 Range/Units





  00:21 04:05 04:05 


 


RBC   3.79 L   (3.80-5.40)  m/uL


 


RDW   16.9 H   (11.5-15.5)  %


 


ABG pH     (7.35-7.45)  


 


ABG pCO2     (35-45)  mmHg


 


ABG pO2     ()  mmHg


 


ABG HCO3     (21-25)  mmol/L


 


ABG Total CO2     (19-24)  mmol/L


 


ABG O2 Saturation     (94-97)  %


 


Chloride    113 H  ()  mmol/L


 


BUN    23 H  (7-17)  mg/dL


 


Creatinine    0.41 L  (0.52-1.04)  mg/dL


 


Glucose    127 H  (74-99)  mg/dL


 


POC Glucose (mg/dL)  172 H    (75-99)  mg/dL


 


Calcium    7.6 L  (8.4-10.2)  mg/dL


 


AST    39 H  (14-36)  U/L


 


CK-MB (CK-2)     (0.0-2.4)  ng/mL


 


Troponin I     (0.000-0.034)  ng/mL


 


Total Protein    4.4 L  (6.3-8.2)  g/dL


 


Albumin    2.1 L  (3.5-5.0)  g/dL














  06/20/20 06/20/20 06/20/20 Range/Units





  04:40 06:16 10:23 


 


RBC     (3.80-5.40)  m/uL


 


RDW     (11.5-15.5)  %


 


ABG pH     (7.35-7.45)  


 


ABG pCO2  49 H    (35-45)  mmHg


 


ABG pO2     ()  mmHg


 


ABG HCO3  32 H    (21-25)  mmol/L


 


ABG Total CO2  33 H    (19-24)  mmol/L


 


ABG O2 Saturation     (94-97)  %


 


Chloride     ()  mmol/L


 


BUN     (7-17)  mg/dL


 


Creatinine     (0.52-1.04)  mg/dL


 


Glucose     (74-99)  mg/dL


 


POC Glucose (mg/dL)   120 H   (75-99)  mg/dL


 


Calcium     (8.4-10.2)  mg/dL


 


AST     (14-36)  U/L


 


CK-MB (CK-2)    9.9 H  (0.0-2.4)  ng/mL


 


Troponin I    0.280 H*  (0.000-0.034)  ng/mL


 


Total Protein     (6.3-8.2)  g/dL


 


Albumin     (3.5-5.0)  g/dL














  06/20/20 Range/Units





  11:38 


 


RBC   (3.80-5.40)  m/uL


 


RDW   (11.5-15.5)  %


 


ABG pH   (7.35-7.45)  


 


ABG pCO2   (35-45)  mmHg


 


ABG pO2   ()  mmHg


 


ABG HCO3   (21-25)  mmol/L


 


ABG Total CO2   (19-24)  mmol/L


 


ABG O2 Saturation   (94-97)  %


 


Chloride   ()  mmol/L


 


BUN   (7-17)  mg/dL


 


Creatinine   (0.52-1.04)  mg/dL


 


Glucose   (74-99)  mg/dL


 


POC Glucose (mg/dL)  127 H  (75-99)  mg/dL


 


Calcium   (8.4-10.2)  mg/dL


 


AST   (14-36)  U/L


 


CK-MB (CK-2)   (0.0-2.4)  ng/mL


 


Troponin I   (0.000-0.034)  ng/mL


 


Total Protein   (6.3-8.2)  g/dL


 


Albumin   (3.5-5.0)  g/dL








                      Microbiology - Last 24 Hours (Table)











 06/19/20 18:00 Gram Stain - Preliminary





 Sputum Sputum Culture - Preliminary


 


 06/19/20 05:10 Blood Culture - Preliminary





 Blood    No Growth after 24 hours


 


 06/15/20 16:30 Blood Culture - Preliminary





 Blood    No Growth after 96 hours


 


 06/15/20 16:25 Blood Culture - Preliminary





 Blood    No Growth after 96 hours


 


 06/14/20 23:00 Anaerobic Culture - Final





 Abdomen    Anaerobic Gm Negative Bacilli





    Clostridium perfringens


 


 06/14/20 23:00 Anaerobic Culture - Final





 Abdomen    Anaerobic Gm Negative Bacilli





    Anaerobic Gm Negative Bacilli#2





    Clostridium perfringens














Assessment and Plan


(1) Perforated sigmoid colon


Narrative/Plan: 


Patient with respiratory failure requiring ventilatory support.  Small amount of

pressors currently.  Continue weaning pressors as able.  Continue cardiac 

workup.  Continue TPN.  Monitor bowel function.  Hopefully we'll be able to 

initiate some tube feeds shortly.


Current Visit: Yes   Status: Acute   Code(s): K63.1 - PERFORATION OF INTESTINE 

(NONTRAUMATIC)   SNOMED Code(s): 984043532

## 2020-06-20 NOTE — P.CRDCN
History of Present Illness


Consult date: 20


History of present illness: 


This is a 59-year-old female who has been here for many days.  Patient had 

expiratory laparotomy for abdominal pain and was found to have ruptured sigmoid 

colon.  Patient had a colectomy.  Patient was extubated about couple of days 

ago.  At the time.  Her mental status was not the best.  However, patient 

progressively got into respiratory failure requiring reintubation.  Her EKG 

showed some T-wave inversions.  Troponins were mildly elevated.  We'll we're 

asked to re-evaluate the patient.  An echocardiogram done today showed evidence 

of severe hypokinesis of the apical segments of the anterior lateral and also 

inferior suggestive of apical ballooning syndrome.  We're waiting for further 

troponin value evaluation.  Patient is been hypotensive.  Not a candidate for 

beta blocker and ACE inhibitor hours at this time.  Patient is receiving 

diuretics.  I will treat her with digoxin at this time.  Her prognosis is poor. 

EEG and CT scans are being planned








Review of Systems





As per the chart





Past Medical History


Past Medical History: Thyroid Disorder


History of Any Multi-Drug Resistant Organisms: None Reported


Past Surgical History: Bariatric Surgery,  Section, Cholecystectomy, 

Tonsillectomy


Additional Past Surgical History / Comment(s): gastric bypass, sinus


Past Psychological History: Depression


Smoking Status: Current every day smoker


Past Alcohol Use History: None Reported


Past Drug Use History: None Reported





Medications and Allergies


                                Home Medications











 Medication  Instructions  Recorded  Confirmed  Type


 


Baclofen [Lioresal] 20 mg PO HS 18 History


 


FLUoxetine HCL [PROzac] 20 mg PO DAILY 18 History


 


Levothyroxine Sodium [Synthroid] 125 mcg PO DAILY 18 History


 


Propranolol HCl [Inderal LA] 80 mg PO HS 18 History


 


Rizatriptan Benzoate [Maxalt MLT] 10 mg PO DAILY PRN 18 History


 


Butalb/APAP/Caff -40Mg 1 tab PO Q4H PRN 20 History





[Fioricet -40]    


 


Chlorthalidone 50 mg PO DAILY 20 History


 


Cholecalciferol [Vitamin D3 (25 1,000 unit PO DAILY 20 History





Mcg = 1000 Iu)]    


 


Multivitamins, Thera [Multivitamin 1 tab PO DAILY 20 History





(formulary)]    








                                    Allergies











Allergy/AdvReac Type Severity Reaction Status Date / Time


 


latex Allergy  Rash/Hives Verified 20 12:08


 


Sulfa (Sulfonamide Allergy  Unknown Verified 20 12:08





Antibiotics)   Childhood  














Physical Exam


Vitals: 


                                   Vital Signs











  Temp Pulse Resp BP Pulse Ox


 


 20 13:30   96  27 H   95


 


 20 13:00   80  25 H   93 L


 


 20 12:30   82  23   93 L


 


 20 12:00  98.2 F  92  24   93 L


 


 20 11:30   96  27 H   94 L


 


 20 11:00   101 H  26 H   96


 


 20 10:30   84  20   98


 


 20 09:30   92  20   98


 


 20 09:00   80  20   100


 


 20 08:30   79  20   99


 


 20 08:00  97.8 F  96  20   99


 


 20 07:30   98  20   99


 


 20 07:00   97  20   99


 


 20 06:30   101 H  20   99


 


 20 06:00   101 H  20   95


 


 20 05:30   99  20   98


 


 20 05:00   96  20   99


 


 20 04:30   97  20   98


 


 20 04:00  98.9 F  96  20   98


 


 20 03:30   92  20   99


 


 20 03:00   99  23   100


 


 20 02:30   98  18   99


 


 20 02:00   98  20   99


 


 20 01:30   92  20  


 


 20 01:00   89  20   95


 


 20 00:30   91  20  


 


 20 00:00  98.4 F  88  20   100


 


 20 23:36   89  20   99


 


 20 23:30   90  20   99


 


 20 23:00   91  20   99


 


 20 22:30   92  20   96


 


 20 22:00   100  16   92 L


 


 20 21:30   99  20   94 L


 


 20 21:00   95  20   94 L


 


 20 20:30   93  20   94 L


 


 20 20:00  98.4 F  114 H  20   95


 


 20 19:30   112 H  20   96


 


 20 18:30   101 H  22   100


 


 20 18:00   105 H  24  78/55  99


 


 20 17:30   73  35 H  83/62  98


 


 20 17:00   121 H  34 H  131/106  95


 


 20 16:30   123 H  36 H  135/111  91 L


 


 20 16:00  98.7 F  124 H  42 H  134/107  90 L


 


 20 15:30   126 H  39 H  130/107  89 L








                                Intake and Output











 20





 06:59 14:59 22:59


 


Intake Total 530.783 836.022 


 


Output Total 450 985 


 


Balance 80.783 -148.978 


 


Intake:   


 


   550 


 


    Mvi, Adult No.4 with Vit 240 150 





    K 10 ml Trace (Conc-1Ml/   





    Dose) 1 ml Potassium   





    Chloride 60 meq Potassium   





    Phosphate 20 mmol In   





    Amino Acid 5%-D15w+Lytes*   





    E* 1,000 ml @ 30 mls/hr   





    IV .Q24H [facility] Rx#:   





    [id number]   


 


    Potassium Chloride 20 meq  100 





    In Water For Injection 1   





    100ml.bag @ 50 mls/hr   





    IVPB Q2H [facility] Rx#:   





    [id number]   


 


    Sodium Chloride 0.45% 1, 160 100 





    000 ml @ 20 mls/hr IV .   





    Q24H [facility] Rx#:[id number]   


 


    Sodium Ferric Gluconat-  100 





    Sucrose 125 mg In Sodium   





    Chloride 0.9% 100 ml @   





    100 mls/hr IVPB DAILY [facility]   





    Rx#:[id number]   


 


    metroNIDAZOLE-NS   100 





    mg   


 


  Intake, IV Titration 130.783 286.022 





  Amount   


 


    Cisatracurium 200 mg In  121.676 





    Sodium Chloride 0.9% 180   





    ml @ 2 MCG/KG/MIN 7.944   





    mls/hr IV .Q24H [facility] Rx#:   





    [id number]   


 


    Propofol 1,000 mg In 130.783 164.346 





    Empty Bag 1 bag @ Titrate   





    IV .Q0M [facility] Rx#:   





    [id number]   


 


Output:   


 


  Drainage 60  


 


    Right Lower Abdomen 60  


 


  Urine 390 985 


 


Other:   


 


  Voiding Method Indwelling Catheter  


 


  Weight  66.2 kg 








                         ABP, PAP, CO, CI - Last 8 Hours











Arterial Blood Pressure        100/63


 


Arterial Blood Pressure        95/57


 


Arterial Blood Pressure        93/57


 


Arterial Blood Pressure        94/59


 


Arterial Blood Pressure        92/61


 


Arterial Blood Pressure        107/73


 


Arterial Blood Pressure        107/70


 


Arterial Blood Pressure        89/53


 


Arterial Blood Pressure        81/49


 


Arterial Blood Pressure        85/56


 


Arterial Blood Pressure        104/63


 


Arterial Blood Pressure        96/60

















GENERAL EXAM: Patient is intubated and sedated


HEENT: Normocephalic. Normal reaction of pupils, equal size, normal range of ex

traocular motion. No erythema or exudates in the throat.


NECK: No masses, no nuchal rigidity.


CHEST: No chest wall deformity.


LUNGS: Equal air entry with no crackles or wheeze.


HEART: S1 and S2 normal with no audible mumurs or gallops. Regular rhythm, 

femorals equal on both sides..


ABDOMEN: Postsurgical


SKIN: No rashes


CENTRAL NERVOUS SYSTEM: She is sedated








Results





                                 20 04:05





                                 20 04:05


                                 Cardiac Enzymes











  20 Range/Units





  04:05 10:23 


 


AST  39 H   (14-36)  U/L


 


CK-MB (CK-2)   9.9 H  (0.0-2.4)  ng/mL


 


Troponin I   0.280 H*  (0.000-0.034)  ng/mL








                                       CBC











  20 Range/Units





  04:05 


 


WBC  8.5  (3.8-10.6)  k/uL


 


RBC  3.79 L  (3.80-5.40)  m/uL


 


Hgb  11.5  (11.4-16.0)  gm/dL


 


Hct  36.7  (34.0-46.0)  %


 


Plt Count  174  (150-450)  k/uL








                          Comprehensive Metabolic Panel











  20 Range/Units





  15:14 04:05 


 


Sodium   144  (137-145)  mmol/L


 


Potassium  3.6  3.9  (3.5-5.1)  mmol/L


 


Chloride   113 H  ()  mmol/L


 


Carbon Dioxide   29  (22-30)  mmol/L


 


BUN   23 H  (7-17)  mg/dL


 


Creatinine   0.41 L  (0.52-1.04)  mg/dL


 


Glucose   127 H  (74-99)  mg/dL


 


Calcium   7.6 L  (8.4-10.2)  mg/dL


 


AST   39 H  (14-36)  U/L


 


ALT   27  (4-34)  U/L


 


Alkaline Phosphatase   82  ()  U/L


 


Total Protein   4.4 L  (6.3-8.2)  g/dL


 


Albumin   2.1 L  (3.5-5.0)  g/dL








                               Current Medications











Generic Name Dose Route Start Last Admin





  Trade Name Freq  PRN Reason Stop Dose Admin


 


Artificial Tears  2 drops  20 20:00  20 11:40





  Artificial Tear Drops  BOTH EYES   2 drops





  Q4HR [facility]   Administration


 


Chlorhexidine Gluconate  15 ml  20 21:00  20 07:57





  Peridex  MUCOUS MEM   15 ml





  BID [facility]   Administration


 


Furosemide  40 mg  20 09:00  20 07:57





  Lasix  IV   40 mg





  DAILY [facility]   Administration


 


Haloperidol Lactate  2 mg  20 08:46  20 09:44





  Haldol  IVP   2 mg





  Q8HR PRN   Administration





  Agitation or Acute Psychosis  


 


Haloperidol Lactate  4 mg  20 08:47  20 01:26





  Haldol  IVP   4 mg





  Q8HR PRN   Administration





  Agitation or Acute Psychosis  


 


Heparin Sodium (Porcine)  5,000 unit  20 21:00  20 07:58





  Heparin  SQ   5,000 unit





  Q12HR [facility]   Administration


 


Hydromorphone HCl  1 mg  20 12:51  20 13:43





  Dilaudid  IVP   1 mg





  Q6HR PRN   Administration





  MODERATE TO SEVERE Pain  


 


Piperacillin Sod/Tazobactam  100 mls @ 25 mls/hr  20 20:00  20 11:39





  Sod 3.375 gm/ Sodium Chloride  IVPB   25 mls/hr





  Q8H [facility]   Administration


 


Metronidazole 500 mg/ IV  100 mls @ 100 mls/hr  06/15/20 06:00  20 11:39





  Solution  IVPB   100 mls/hr





  Q6HR [facility]   Administration


 


Acetaminophen 1,000 mg/ IV  100 mls @ 400 mls/hr  06/15/20 14:40  20 22:54





  Solution  IVPB   400 mls/hr





  Q6HR PRN   Administration





  Fever and/ or Pain  


 


Sodium Chloride  1,000 mls @ 20 mls/hr  20 08:45  20 18:26





  Saline 0.45%  IV   Not Given





  .Q24H [facility]  


 


Ferric Sodium Gluconate 125 mg  110 mls @ 100 mls/hr  20 09:00  20 

08:20





  / Sodium Chloride  IVPB  20 10:05  100 mls/hr





  DAILY [facility]   Administration


 


Parenteral Vitamin Supplement  1,047.6667 mls @ 30 mls/hr  20 17:00  

20 18:26





10 ml/ Chromium/Copper/  IV  20 17:59  30 mls/hr





Manganese/Seleni/Zn 1 ml/  .Q24H [facility]   Administration





Potassium Chloride 60 meq/   





Potassium Phosphate 20 mmol/   





Amino Ac/Electrol/Dextrose/   





Calcium   


 


Cisatracurium Besylate 200 mg/  200 mls @ 7.944 mls/hr  20 19:00  20

10:30





  Sodium Chloride  IV   0 mcg/kg/min





  .Q24H [facility]   0 mls/hr





    Titration





  Protocol  





  2 MCG/KG/MIN  


 


Norepinephrine Bitartrate 8 mg  258 mls @ 6.405 mls/hr  20 20:30  20

07:52





  / Sodium Chloride  IV   Not Given





  .Q24H [facility]  





  Protocol  





  0.05 MCG/KG/MIN  


 


Parenteral Vitamin Supplement  1,036 mls @ 55 mls/hr  20 18:00 





10 ml/ Chromium/Copper/  IV  





Manganese/Seleni/Zn 1 ml/  .[id number] [facility]  





Potassium Chloride 40 meq/   





Potassium Phosphate 15 mmol/   





Amino Ac/Electrol/Dextrose/   





Calcium   


 


Propofol 1,000 mg/ IV Solution  100 mls @ 0 mls/hr  20 11:00 





  IV  





  .Q0M [facility]  





  Protocol  





  Titrate  


 


Insulin Aspart  0 unit  20 12:00  20 11:38





  Novolog  SQ   Not Given





  Q6H [facility]  





  Protocol  


 


Levothyroxine Sodium  100 mcg  20 09:00  20 07:57





  Synthroid Ivp  IV   100 mcg





  DAILY [facility]   Administration


 


Metoprolol Tartrate  5 mg  20 09:00  20 07:58





  Lopressor  IVP   5 mg





  BID [facility]   Administration


 


Miscellaneous Information  1 each  20 05:37 





  Potassium Per Protocol  MISCELLANE  





  DAILY PRN  





  Per Protocol  





  Protocol  


 


Miscellaneous Information  1 each  20 05:20 





  Phosphorus Per Protocol  MISCELLANE  





  DAILY PRN  





  Per Protocol  





  Protocol  


 


Naloxone HCl  0.2 mg  20 11:56 





  Narcan  IV  





  Q2M PRN  





  Opioid Reversal  


 


Pantoprazole Sodium  40 mg  06/15/20 09:00  20 07:59





  Protonix  IV   40 mg





  DAILY [facility]   Administration


 


Spironolactone  50 mg  20 09:00  20 07:59





  Aldactone  PO   50 mg





  DAILY [facility]   Administration


 


Thiamine HCl  100 mg  20 11:00  20 07:59





  Vitamin B-1  IVP   100 mg





  Q12HR [facility]   Administration








                                Intake and Output











 20





 06:59 14:59 22:59


 


Intake Total 530.783 836.022 


 


Output Total 450 985 


 


Balance 80.783 -148.978 


 


Intake:   


 


   550 


 


    Mvi, Adult No.4 with Vit 240 150 





    K 10 ml Trace (Conc-1Ml/   





    Dose) 1 ml Potassium   





    Chloride 60 meq Potassium   





    Phosphate 20 mmol In   





    Amino Acid 5%-D15w+Lytes*   





    E* 1,000 ml @ 30 mls/hr   





    IV .Q24H [facility] Rx#:   





    [id number]   


 


    Potassium Chloride 20 meq  100 





    In Water For Injection 1   





    100ml.bag @ 50 mls/hr   





    IVPB Q2H [facility] Rx#:   





    [id number]   


 


    Sodium Chloride 0.45% 1, 160 100 





    000 ml @ 20 mls/hr IV .   





    Q24H [facility] Rx#:[id number]   


 


    Sodium Ferric Gluconat-  100 





    Sucrose 125 mg In Sodium   





    Chloride 0.9% 100 ml @   





    100 mls/hr IVPB DAILY [facility]   





    Rx#:[id number]   


 


    metroNIDAZOLE-NS   100 





    mg   


 


  Intake, IV Titration 130.783 286.022 





  Amount   


 


    Cisatracurium 200 mg In  121.676 





    Sodium Chloride 0.9% 180   





    ml @ 2 MCG/KG/MIN 7.944   





    mls/hr IV .Q24H [facility] Rx#:   





    [id number]   


 


    Propofol 1,000 mg In 130.783 164.346 





    Empty Bag 1 bag @ Titrate   





    IV .Q0M [facility] Rx#:   





    [id number]   


 


Output:   


 


  Drainage 60  


 


    Right Lower Abdomen 60  


 


  Urine 390 985 


 


Other:   


 


  Voiding Method Indwelling Catheter  


 


  Weight  66.2 kg 








                                 Patient Weight











 20





 06:59


 


Weight 66.2 kg








                                        





                                 20 04:05 





                                 20 04:05 











EKG Interpretations (text)





Showed sinus rhythm with T-wave inversion in the anterior leads





Assessment and Plan


(1) Apical ballooning syndrome


Current Visit: Yes   Status: Acute   Code(s): I51.81 - TAKOTSUBO SYNDROME   

SNOMED Code(s): 320577968


   





(2) Perforated sigmoid colon


Current Visit: Yes   Status: Acute   Code(s): K63.1 - PERFORATION OF INTESTINE 

(NONTRAUMATIC)   SNOMED Code(s): 091808877


   





(3) Status post colectomy


Current Visit: Yes   Status: Acute   Code(s): Z90.49 - ACQUIRED ABSENCE OF OTHER

SPECIFIED PARTS OF DIGESTIVE TRACT   SNOMED Code(s): 517370999


   





(4) History of gastric bypass


Current Visit: Yes   Status: Acute   Code(s): Z98.84 - BARIATRIC SURGERY STATUS 

 SNOMED Code(s): 101768630


   





(5) Hypothyroidism


Current Visit: Yes   Status: Acute   Code(s): E03.9 - HYPOTHYROIDISM, 

UNSPECIFIED   SNOMED Code(s): 62735002


   





(6) Congestive heart failure


Current Visit: Yes   Status: Acute   Code(s): I50.9 - HEART FAILURE, UNSPECIFIED

  SNOMED Code(s): 58312400


   


Plan: 


As patient is hypotensive, I'm not initiating beta blocker or ACE inhibitor at 

this time.  We'll treat her with digoxin.  If the blood pressure stabilizes, 

we'll start her on beta blocker and also ACE inhibitor .  Continue the 

diuretics.  Prognosis is poor

## 2020-06-20 NOTE — XR
EXAMINATION TYPE: XR chest 1V portable

 

DATE OF EXAM: 6/20/2020

 

HISTORY: Tube placement.

 

REFERENCE: Previous study dated 6/19/2020.

 

FINDINGS: The patient is ET tube and right internal jugular catheter remain in place, unchanged in ap
pearance. The patient is NG tube is been advanced and its tip is well within the stomach.

 

There is continuing bibasilar lateral airspace disease. Overall aeration has improved bilaterally. He
art size is upper limits of normal. There is blunting of both CP angles. I could not exclude small ef
fusions.

 

IMPRESSION: 

 

IMPROVED AERATION, BOTH LUNGS.

## 2020-06-20 NOTE — ECHOF
Referral Reason:ekg changes/troponin leak



MEASUREMENTS

--------

HEIGHT: 165.1 cm

WEIGHT: 68.0 kg

BP: 

IVSd:   1.1 cm     (0.6 - 1.1)

LVIDd:   3.2 cm     (3.9 - 5.3)

LVPWd:   1.1 cm     (0.6 - 1.1)

IVSs:   0.9 cm

LVIDs:   2.8 cm

LVPWs:   1.1 cm

LAESV Index (A-L):   19.08 ml/m

Ao Diam:   3.1 cm     (2.0 - 3.7)

AV Cusp:   2.2 cm     (1.5 - 2.6)

LA Diam:   2.0 cm     (2.7 - 3.8)

MV EXCURSION:   17.701 mm     (> 18.000)

MV EF SLOPE:   183 mm/s     (70 - 150)

EPSS:   0.5 cm

MV E Jan:   0.63 m/s

MV DecT:   207 ms

MV A Jan:   0.57 m/s

MV E/A Ratio:   1.09 

RAP:   5.00 mmHg

RVSP:   39.77 mmHg







FINDINGS

--------

Sinus rhythm.

This was a technically good study.

The left ventricular size is normal.   Left ventricular wall thickness is normal.   Overall left vent
ricular systolic function is moderate-severely impaired with, an EF between 30 - 35 %.   Apical anter
ior LV wall motion is hypokinetic.    Apical lateral LV wall motion is hypokinetic.    Apical inferio
r LV wall motion is hypokinetic.    Apical septum LV wall motion is hypokinetic.    .  The findings a
re consistent with apical ballooning syndrome

The right ventricle is normal in size.

The left atrial size is normal.    Normal LA  size by volume 22+/-6 ml/m2.

The right atrial size is normal.

The aortic valve is trileaflet and appears structurally normal.

The mitral valve is normal.   Mild mitral regurgitation is present.

The tricuspid valve appears structurally normal.   Moderate tricuspid regurgitation present.   There 
is mild pulmonary hypertension.   The right ventricular systolic pressure, as measured by Doppler, is
 39.77mmHg.

There is no pulmonic regurgitation present.

The aortic root size is normal.

Normal inferior vena cava with normal inspiratory collapse consistent with estimated right atrial pre
ssure of  5 mmHg.

There is no pericardial effusion.



CONCLUSIONS

--------

1. Sinus rhythm.

2. This was a technically good study.

3. The left ventricular size is normal.

4. Left ventricular wall thickness is normal.

5. Overall left ventricular systolic function is moderate-severely impaired with, an EF between 30 - 
35 %.

6. Apical anterior LV wall motion is hypokinetic.

7. Apical lateral LV wall motion is hypokinetic.

8. Apical inferior LV wall motion is hypokinetic.

9. Apical septum LV wall motion is hypokinetic.

10. .  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

11. The right ventricle is normal in size.

12. The left atrial size is normal.

13. Normal LA size by volume 22+/-6 ml/m2.

14. The right atrial size is normal.

15. The aortic valve is trileaflet and appears structurally normal.

16. The mitral valve is normal.

17. Mild mitral regurgitation is present.

18. The tricuspid valve appears structurally normal.

19. Moderate tricuspid regurgitation present.

20. There is mild pulmonary hypertension.

21. The right ventricular systolic pressure, as measured by Doppler, is 39.77mmHg.

22. There is no pulmonic regurgitation present.

23. The aortic root size is normal.

24. Normal inferior vena cava with normal inspiratory collapse consistent with estimated right atrial
 pressure of  5 mmHg.

25. There is no pericardial effusion.





SONOGRAPHER: Kayleigh Barrett RDCS

## 2020-06-20 NOTE — PN
PROGRESS NOTE



Patient is seen for followup for acute kidney injury.  She was Reintubated yesterday

for worsening fluid overload.  Patient did get about 2 doses of IV Lasix and diuresed

quite a bit.  However, she continued to remain hypoxic and mentation was declining.

Therefore, patient was intubated.  She is currently sedated. She is on the vent.  She

has had good urine output.



PHYSICAL EXAMINATION:

Blood pressure was 107/70, heart rate 84 per minute, she is afebrile. Examination of

the heart S1, S2.  Examination of the lungs, bilateral breath sounds are heard.

Abdomen is soft, nontender.  Examination of lower extremities shows trace edema

bilaterally.  CNS exam cannot be performed.



LAB:

Show sodium of 144, potassium 3.9, chloride 113, BUN 23, serum creatinine 0.41.



ASSESSMENT:

1. Acute kidney injury, acute tubular necrosis, secondary to

    hypotension/hypoperfusion, currently improved.

2. Acute abdomen on admission status post explorative laparotomy, bowel resection and

    colostomy.

3. Volume overload, currently maintained on IV Lasix which we will continue.

4. Hypoxic respiratory failure.  Chest x-ray showing pulmonary vascular congestion and

    small pleural effusions at the time of intubation yesterday.  Chest x-ray from

    today is improved.

5. Iron deficiency, maintained on IV iron.

6. Altered mentation status, status post brain CT with no acute findings.



PLAN:

Continue with IV Lasix.  Monitor potassium closely and replace aggressively.  The

patient is about positive 8 L since admission.





MMODL / IJN: [id number] / Job#: [id number]

## 2020-06-21 LAB
ALBUMIN SERPL-MCNC: 2.2 G/DL (ref 3.5–5)
ALP SERPL-CCNC: 84 U/L (ref 38–126)
ALT SERPL-CCNC: 25 U/L (ref 4–34)
ANION GAP SERPL CALC-SCNC: 3 MMOL/L
AST SERPL-CCNC: 33 U/L (ref 14–36)
BUN SERPL-SCNC: 29 MG/DL (ref 7–17)
CALCIUM SPEC-MCNC: 8.1 MG/DL (ref 8.4–10.2)
CELLS COUNTED: 200
CHLORIDE SERPL-SCNC: 111 MMOL/L (ref 98–107)
CO2 BLDA-SCNC: 36 MMOL/L (ref 19–24)
CO2 SERPL-SCNC: 33 MMOL/L (ref 22–30)
ERYTHROCYTE [DISTWIDTH] IN BLOOD BY AUTOMATED COUNT: 4.04 M/UL (ref 3.8–5.4)
ERYTHROCYTE [DISTWIDTH] IN BLOOD: 16.8 % (ref 11.5–15.5)
GLUCOSE BLD-MCNC: 127 MG/DL (ref 75–99)
GLUCOSE BLD-MCNC: 128 MG/DL (ref 75–99)
GLUCOSE BLD-MCNC: 134 MG/DL (ref 75–99)
GLUCOSE BLD-MCNC: 137 MG/DL (ref 75–99)
GLUCOSE BLD-MCNC: 157 MG/DL (ref 75–99)
GLUCOSE BLD-MCNC: 160 MG/DL (ref 75–99)
GLUCOSE SERPL-MCNC: 146 MG/DL (ref 74–99)
HCO3 BLDA-SCNC: 35 MMOL/L (ref 21–25)
HCT VFR BLD AUTO: 39.7 % (ref 34–46)
HGB BLD-MCNC: 12.8 GM/DL (ref 11.4–16)
LYMPHOCYTES # BLD MANUAL: 1.3 K/UL (ref 1–4.8)
MAGNESIUM SPEC-SCNC: 2 MG/DL (ref 1.6–2.3)
MCH RBC QN AUTO: 31.6 PG (ref 25–35)
MCHC RBC AUTO-ENTMCNC: 32.2 G/DL (ref 31–37)
MCV RBC AUTO: 98.3 FL (ref 80–100)
METAMYELOCYTES # BLD: 0.11 K/UL
MONOCYTES # BLD MANUAL: 0.86 K/UL (ref 0–1)
MYELOCYTES # BLD MANUAL: 0.11 K/UL
NEUTROPHILS NFR BLD MANUAL: 79 %
NEUTS SEG # BLD MANUAL: 8.53 K/UL (ref 1.3–7.7)
PCO2 BLDA: 45 MMHG (ref 35–45)
PH BLDA: 7.5 [PH] (ref 7.35–7.45)
PLATELET # BLD AUTO: 233 K/UL (ref 150–450)
PO2 BLDA: 79 MMHG (ref 83–108)
POTASSIUM SERPL-SCNC: 3.9 MMOL/L (ref 3.5–5.1)
PROT SERPL-MCNC: 4.7 G/DL (ref 6.3–8.2)
SODIUM SERPL-SCNC: 147 MMOL/L (ref 137–145)
WBC # BLD AUTO: 10.8 K/UL (ref 3.8–10.6)

## 2020-06-21 RX ADMIN — METRONIDAZOLE SCH MLS/HR: 500 INJECTION, SOLUTION INTRAVENOUS at 12:26

## 2020-06-21 RX ADMIN — CHLORHEXIDINE GLUCONATE SCH ML: 1.2 RINSE ORAL at 20:53

## 2020-06-21 RX ADMIN — PROPOFOL SCH MLS/HR: 10 INJECTION, EMULSION INTRAVENOUS at 08:44

## 2020-06-21 RX ADMIN — LEVOTHYROXINE SODIUM ANHYDROUS SCH MCG: 100 INJECTION, POWDER, LYOPHILIZED, FOR SOLUTION INTRAVENOUS at 09:32

## 2020-06-21 RX ADMIN — PROPOFOL SCH MLS/HR: 10 INJECTION, EMULSION INTRAVENOUS at 04:08

## 2020-06-21 RX ADMIN — DIGOXIN SCH MCG: 0.25 INJECTION INTRAMUSCULAR; INTRAVENOUS at 09:33

## 2020-06-21 RX ADMIN — SPIRONOLACTONE SCH MG: 25 TABLET, FILM COATED ORAL at 09:33

## 2020-06-21 RX ADMIN — PANTOPRAZOLE SODIUM SCH MG: 40 INJECTION, POWDER, FOR SOLUTION INTRAVENOUS at 09:33

## 2020-06-21 RX ADMIN — INSULIN ASPART SCH: 100 INJECTION, SOLUTION INTRAVENOUS; SUBCUTANEOUS at 19:11

## 2020-06-21 RX ADMIN — SODIUM CHLORIDE SCH MG: 900 INJECTION, SOLUTION INTRAVENOUS at 20:53

## 2020-06-21 RX ADMIN — INSULIN ASPART SCH UNIT: 100 INJECTION, SOLUTION INTRAVENOUS; SUBCUTANEOUS at 06:06

## 2020-06-21 RX ADMIN — METRONIDAZOLE SCH MLS/HR: 500 INJECTION, SOLUTION INTRAVENOUS at 19:20

## 2020-06-21 RX ADMIN — METRONIDAZOLE SCH MLS/HR: 500 INJECTION, SOLUTION INTRAVENOUS at 00:00

## 2020-06-21 RX ADMIN — INSULIN ASPART SCH UNIT: 100 INJECTION, SOLUTION INTRAVENOUS; SUBCUTANEOUS at 23:59

## 2020-06-21 RX ADMIN — AMPICILLIN SODIUM AND SULBACTAM SODIUM SCH MLS/HR: 2; 1 INJECTION, POWDER, FOR SOLUTION INTRAMUSCULAR; INTRAVENOUS at 23:59

## 2020-06-21 RX ADMIN — PROPOFOL SCH MLS/HR: 10 INJECTION, EMULSION INTRAVENOUS at 19:10

## 2020-06-21 RX ADMIN — INSULIN ASPART SCH UNIT: 100 INJECTION, SOLUTION INTRAVENOUS; SUBCUTANEOUS at 01:47

## 2020-06-21 RX ADMIN — NOREPINEPHRINE BITARTRATE SCH MLS/HR: 1 INJECTION, SOLUTION, CONCENTRATE INTRAVENOUS at 13:57

## 2020-06-21 RX ADMIN — LEUCINE, PHENYLALANINE, LYSINE, METHIONINE, ISOLEUCINE, VALINE, HISTIDINE, THREONINE, TRYPTOPHAN, ALANINE, GLYCINE, ARGININE, PROLINE, SERINE, TYROSINE, DEXTROSE SCH MLS/HR: 365; 280; 290; 200; 300; 290; 240; 210; 90; 1035; 515; 575; 340; 250; 20; 15 INJECTION INTRAVENOUS at 13:03

## 2020-06-21 RX ADMIN — SODIUM FERRIC GLUCONATE COMPLEX SCH MLS/HR: 12.5 INJECTION INTRAVENOUS at 09:30

## 2020-06-21 RX ADMIN — HYDROMORPHONE HYDROCHLORIDE PRN MG: 1 INJECTION, SOLUTION INTRAMUSCULAR; INTRAVENOUS; SUBCUTANEOUS at 15:36

## 2020-06-21 RX ADMIN — PIPERACILLIN AND TAZOBACTAM SCH MLS/HR: 3; .375 INJECTION, POWDER, FOR SOLUTION INTRAVENOUS at 03:00

## 2020-06-21 RX ADMIN — HEPARIN SODIUM SCH UNIT: 5000 INJECTION, SOLUTION INTRAVENOUS; SUBCUTANEOUS at 20:53

## 2020-06-21 RX ADMIN — CARVEDILOL SCH MG: 3.12 TABLET, FILM COATED ORAL at 08:45

## 2020-06-21 RX ADMIN — INSULIN ASPART SCH UNIT: 100 INJECTION, SOLUTION INTRAVENOUS; SUBCUTANEOUS at 12:26

## 2020-06-21 RX ADMIN — SODIUM CHLORIDE SCH MG: 900 INJECTION, SOLUTION INTRAVENOUS at 09:31

## 2020-06-21 RX ADMIN — FUROSEMIDE SCH MG: 10 INJECTION, SOLUTION INTRAMUSCULAR; INTRAVENOUS at 09:32

## 2020-06-21 RX ADMIN — SODIUM BICARBONATE SCH MLS/HR: 84 INJECTION, SOLUTION INTRAVENOUS at 19:10

## 2020-06-21 RX ADMIN — PIPERACILLIN AND TAZOBACTAM SCH MLS/HR: 3; .375 INJECTION, POWDER, FOR SOLUTION INTRAVENOUS at 12:27

## 2020-06-21 RX ADMIN — HEPARIN SODIUM SCH UNIT: 5000 INJECTION, SOLUTION INTRAVENOUS; SUBCUTANEOUS at 09:31

## 2020-06-21 RX ADMIN — CARVEDILOL SCH MG: 3.12 TABLET, FILM COATED ORAL at 19:10

## 2020-06-21 RX ADMIN — FUROSEMIDE SCH MG: 10 INJECTION, SOLUTION INTRAMUSCULAR; INTRAVENOUS at 20:53

## 2020-06-21 RX ADMIN — PIPERACILLIN AND TAZOBACTAM SCH: 3; .375 INJECTION, POWDER, FOR SOLUTION INTRAVENOUS at 21:01

## 2020-06-21 RX ADMIN — CHLORHEXIDINE GLUCONATE SCH ML: 1.2 RINSE ORAL at 09:33

## 2020-06-21 RX ADMIN — METRONIDAZOLE SCH MLS/HR: 500 INJECTION, SOLUTION INTRAVENOUS at 06:35

## 2020-06-21 NOTE — P.PN
Subjective


Progress Note Date: 06/21/20


Principal diagnosis: 





Colonic perforation





Patient remains on the ventilator.  Low-dose Levophed still present.  White 

blood cell count 10.8.  Urine output was low overnight but improved at this 

time.  Going to have a EEG today.  Ostomy remains pink without significant 

output.





Objective





- Vital Signs


Vital signs: 


                                   Vital Signs











Temp  98.4 F   06/21/20 04:00


 


Pulse  87   06/21/20 07:00


 


Resp  20   06/21/20 07:00


 


BP  78/55   06/19/20 18:00


 


Pulse Ox  100   06/21/20 07:00








                                 Intake & Output











 06/20/20 06/21/20 06/21/20





 18:59 06:59 18:59


 


Intake Total 7335.402 5281.627 264.693


 


Output Total 1785 2560 35


 


Balance -269.032 -1248.373 229.693


 


Weight 66.2 kg 77.6 kg 


 


Intake:   


 


  IV 1175 335 100


 


    Mvi, Adult No.4 with Vit 535 55 





    K 10 ml Trace (Conc-1Ml/   





    Dose) 1 ml Potassium   





    Chloride 60 meq Potassium   





    Phosphate 20 mmol In   





    Amino Acid 5%-D15w+Lytes*   





    E* 1,000 ml @ 30 mls/hr   





    IV .Q24H [facility] Rx#:   





    [id number]   


 


    Pipercillin/ Tazobactam 3  200 





    .375 g   


 


    Potassium Chloride 20 meq 100  





    In Water For Injection 1   





    100ml.bag @ 50 mls/hr   





    IVPB Q2H [facility] Rx#:   





    [id number]   


 


    Sodium Chloride 0.45% 1, 240 80 





    000 ml @ 20 mls/hr IV .   





    Q24H [facility] Rx#:[id number]   


 


    Sodium Ferric Gluconat- 100  





    Sucrose 125 mg In Sodium   





    Chloride 0.9% 100 ml @   





    100 mls/hr IVPB DAILY [facility]   





    Rx#:[id number]   


 


    metroNIDAZOLE-NS  200  100





    mg   


 


  Intake, IV Titration 340.968 976.627 164.693





  Amount   


 


    Cisatracurium 200 mg In 121.676  





    Sodium Chloride 0.9% 180   





    ml @ 2 MCG/KG/MIN 7.944   





    mls/hr IV .Q24H [facility] Rx#:   





    [id number]   


 


    Mvi, Adult No.4 with Vit  440 55





    K 10 ml Trace (Conc-1Ml/   





    Dose) 1 ml Potassium   





    Chloride 40 meq Potassium   





    Phosphate 15 mmol In   





    Amino Acid 5%-D15w+Lytes*   





    E* 1,000 ml @ 55 mls/hr   





    IV .F30M40D [facility] Rx#:   





    [id number]   


 


    Norepinephrine 8 mg In  91.891 





    Sodium Chloride 0.9% 250   





    ml @ 0.05 MCG/KG/MIN 6.   





    405 mls/hr IV .Q24H [facility]   





    Rx#:[id number]   


 


    Potassium Chloride 20 meq  100 





    In Water For Injection 1   





    100ml.bag @ 50 mls/hr   





    IVPB ONCE STA Rx#:   





    [id number]   


 


    Propofol 1,000 mg In 164.346  





    Empty Bag 1 bag @ Titrate   





    IV .Q0M [facility] Rx#:   





    [id number]   


 


    Propofol 1,000 mg In 54.946 184.736 109.693





    Empty Bag 1 bag @ Titrate   





    IV .Q0M [facility] Rx#:   





    [id number]   


 


    Sodium Chloride 0.45% 1,  160 





    000 ml @ 20 mls/hr IV .   





    Q24H [facility] Rx#:[id number]   


 


Output:   


 


  Drainage 100 130 


 


    Right Lower Abdomen 100 130 


 


  Urine 1685 2430 35


 


Other:   


 


  Voiding Method Indwelling Catheter Indwelling Catheter 








                       ABP, PAP, CO, CI - Last Documented











Arterial Blood Pressure        103/65

















- Exam





Abdomen: Soft, mild distention, no appreciable tenderness currently, dressing in

place, ostomy pink with mucousy output





- Labs


CBC & Chem 7: 


                                 06/21/20 04:11





                                 06/21/20 04:11


Labs: 


                  Abnormal Lab Results - Last 24 Hours (Table)











  06/20/20 06/20/20 06/20/20 Range/Units





  10:23 11:38 17:10 


 


WBC     (3.8-10.6)  k/uL


 


RDW     (11.5-15.5)  %


 


Neutrophils # (Manual)     (1.3-7.7)  k/uL


 


Metamyelocytes # (Man)     (0)  k/uL


 


Myelocytes # (Manual)     (0)  k/uL


 


Nucleated RBCs     (0-0)  /100 WBC


 


ABG pH     (7.35-7.45)  


 


ABG pO2     ()  mmHg


 


ABG HCO3     (21-25)  mmol/L


 


ABG Total CO2     (19-24)  mmol/L


 


Sodium     (137-145)  mmol/L


 


Chloride     ()  mmol/L


 


Carbon Dioxide     (22-30)  mmol/L


 


BUN     (7-17)  mg/dL


 


Creatinine     (0.52-1.04)  mg/dL


 


Glucose     (74-99)  mg/dL


 


POC Glucose (mg/dL)   127 H  143 H  (75-99)  mg/dL


 


Calcium     (8.4-10.2)  mg/dL


 


CK-MB (CK-2)  9.9 H    (0.0-2.4)  ng/mL


 


Troponin I  0.280 H*    (0.000-0.034)  ng/mL


 


Total Protein     (6.3-8.2)  g/dL


 


Albumin     (3.5-5.0)  g/dL














  06/20/20 06/21/20 06/21/20 Range/Units





  17:10 01:42 04:11 


 


WBC     (3.8-10.6)  k/uL


 


RDW     (11.5-15.5)  %


 


Neutrophils # (Manual)     (1.3-7.7)  k/uL


 


Metamyelocytes # (Man)     (0)  k/uL


 


Myelocytes # (Manual)     (0)  k/uL


 


Nucleated RBCs     (0-0)  /100 WBC


 


ABG pH     (7.35-7.45)  


 


ABG pO2     ()  mmHg


 


ABG HCO3     (21-25)  mmol/L


 


ABG Total CO2     (19-24)  mmol/L


 


Sodium    147 H  (137-145)  mmol/L


 


Chloride    111 H  ()  mmol/L


 


Carbon Dioxide    33 H  (22-30)  mmol/L


 


BUN    29 H  (7-17)  mg/dL


 


Creatinine    0.44 L  (0.52-1.04)  mg/dL


 


Glucose    146 H  (74-99)  mg/dL


 


POC Glucose (mg/dL)   160 H   (75-99)  mg/dL


 


Calcium    8.1 L  (8.4-10.2)  mg/dL


 


CK-MB (CK-2)     (0.0-2.4)  ng/mL


 


Troponin I  0.204 H*    (0.000-0.034)  ng/mL


 


Total Protein    4.7 L  (6.3-8.2)  g/dL


 


Albumin    2.2 L  (3.5-5.0)  g/dL














  06/21/20 06/21/20 06/21/20 Range/Units





  04:11 04:52 05:58 


 


WBC  10.8 H    (3.8-10.6)  k/uL


 


RDW  16.8 H    (11.5-15.5)  %


 


Neutrophils # (Manual)  8.53 H    (1.3-7.7)  k/uL


 


Metamyelocytes # (Man)  0.11 H    (0)  k/uL


 


Myelocytes # (Manual)  0.11 H    (0)  k/uL


 


Nucleated RBCs  2 H    (0-0)  /100 WBC


 


ABG pH   7.50 H   (7.35-7.45)  


 


ABG pO2   79 L   ()  mmHg


 


ABG HCO3   35 H   (21-25)  mmol/L


 


ABG Total CO2   36 H   (19-24)  mmol/L


 


Sodium     (137-145)  mmol/L


 


Chloride     ()  mmol/L


 


Carbon Dioxide     (22-30)  mmol/L


 


BUN     (7-17)  mg/dL


 


Creatinine     (0.52-1.04)  mg/dL


 


Glucose     (74-99)  mg/dL


 


POC Glucose (mg/dL)    137 H  (75-99)  mg/dL


 


Calcium     (8.4-10.2)  mg/dL


 


CK-MB (CK-2)     (0.0-2.4)  ng/mL


 


Troponin I     (0.000-0.034)  ng/mL


 


Total Protein     (6.3-8.2)  g/dL


 


Albumin     (3.5-5.0)  g/dL








                      Microbiology - Last 24 Hours (Table)











 06/19/20 05:10 Blood Culture - Preliminary





 Blood    No Growth after 48 hours


 


 06/15/20 16:30 Blood Culture - Preliminary





 Blood    No Growth after 120 hours


 


 06/15/20 16:25 Blood Culture - Preliminary





 Blood    No Growth after 120 hours


 


 06/19/20 18:00 Gram Stain - Preliminary





 Sputum Sputum Culture - Preliminary














Assessment and Plan


(1) Perforated sigmoid colon


Narrative/Plan: 


Continue TPN and nothing by mouth.  Continue weaning pressors as able.  Continue

neurologic workup.  Possible tracheostomy and PEG tube placement this week.


Current Visit: Yes   Status: Acute   Code(s): K63.1 - PERFORATION OF INTESTINE 

(NONTRAUMATIC)   SNOMED Code(s): 268488246

## 2020-06-21 NOTE — PN
PROGRESS NOTE



DATE OF SERVICE:

06/21/2020



REASON FOR FOLLOW UP:

Secondary peritonitis from perforated sigmoid diverticulitis.



INTERVAL HISTORY:

Patient is currently afebrile.  The patient remains to be hemodynamically stable,

remains to be intubated on the vent.  FiO2 is currently stable.  No significant

purulent secretion through the ET or drainage is reported by nursing staff.



PHYSICAL EXAMINATION:

Blood pressure 152/90 with a pulse of 90. Temperature 98.  She is 97% on 50% FiO2.

General description is a middle-aged female lying in bed in no distress.

Respiratory system: Unlabored breathing, coarse breath sounds and wheeze bilaterally.

Heart S1, S2.  Regular rate.

ABDOMEN:  Soft, no tenderness.



LAB:

Hemoglobin 12, white count 10.8, creatinine 0.44.  Abdominal cultures with question of

anaerobes.



DIAGNOSTIC IMPRESSION AND PLAN:

Patient with abdominal abscess from a perforated sigmoid diverticulitis status post

laparotomy and operative repair of the same,  culture predominate with anaerobes.  The

patient is covered with Zosyn. White count normal.  No fever.  We will monitor patient

closely.  Continue supportive care.





MMODL / IJN: [id number] / Job#: [id number]

## 2020-06-21 NOTE — PN
PROGRESS NOTE



DATE OF SERVICE:

June 21, 2020.



This is a 59-year-old female seen 6 days ago in consultation.  She is postop day #7

status post exploratory laparotomy with sigmoid colectomy for perforated sigmoid colon,

Roselia's procedure, disimpaction of descending colon, open active appendectomy,

abdominal washout for fecal peritonitis with 6 L of saline, placement of a Jimenez-

Rincon drain, and application of an incisional wound VAC system.  The surgery was done

by Dr. Reed.  The patient was initially intubated and extubated successfully on

June 17.  Unfortunately, because of increasing respiratory rate and respiratory

insufficiency and mental status changes, she needed to be reintubated on June 19.

Currently, she remains on the ventilator.  She is on the volume assist-control mode

rate of 20, tidal volume 350, FiO2 of 50%, PEEP of 10.  Blood gases show pO2 of 79, a

pCO2 of 45 and a pH 7.5.  Yesterday, she was on FiO2 of 80% with a PEEP of 5.  She is

currently on norepinephrine at 6.2 mcg/minute TPN at 55 mL an hour, propofol at 30

mcg/kg per minute and saline at 10 mL an hour.  Her ejection fraction is significantly

reduced now and down to about 30-35 percent.  Dr. Zavala saw the patient yesterday

and thought it was related to apical ballooning.  A CT of the brain was negative.  EEG

will be done today.  She is currently on Flagyl and Zosyn.



PHYSICAL EXAMINATION:

VITAL SIGNS: Current vital signs are reviewed.  Temperature is 99.1, heart rate 90,

respiratory rate 23, blood pressure 122/90 mean 100.  Saturations are 98%.  CVP is 9,

FiO2 50%.

Appears in no acute distress.

HEENT: Examination is grossly unremarkable.  There is an orally placed endotracheal

tube and NG tube.

NECK:  Supple. Full range of motion.  No adenopathy.  Neck veins are flat.

CARDIOVASCULAR: Examination reveals regular rhythm and rate.  Heart rate about 80 beats

per minute.  S1, S2 normal.

LUNGS:  Reveal scattered rhonchi.  Breath sounds equal.  No wheezes or crackles.

ABDOMEN:  Soft.  Bowel sounds are not noted.

EXTREMITIES are intact.  Minimal edema.

SKIN: Without rash.

NEUROLOGIC: Examination is difficult to assess given the fact she is sedated with

propofol.



LAB DATA:

Reviewed.  White count 10.8, hemoglobin 12.8, hematocrit 39.7, platelet count 233,000,

blood gases as mentioned show PO2 79, pCO2 45, pH 7.50.  These blood gases consistent

with a metabolic alkalosis.  Sodium 147, potassium 3.9, chloride 111, CO2 33 anion gap

is 3. BUN and creatinine were 29 and 0.44.  Glucose is 137.  Troponin was 0.204.

Albumin 2.2. Microbiology from the 14th shows evidence of anaerobic gram-negative

bacilli, 2 different species, as well as Clostridium perfringens from the abdominal

wound cultures.



She remains on Flagyl and Zosyn.



Chest x-ray shows increased and improved aeration bilaterally.



Current medications are reviewed.



ASSESSMENT:

1. Postoperative day #7 status post exploratory laparotomy with sigmoid colectomy for

    perforated sigmoid colon, Roselia's procedure, disimpaction of descending colon,

    open appendectomy, abdominal washout for fecal peritonitis, placement of a Jimenez-

    Rincon drain, and application of an incisional wound VAC system.

2. Worsening oxygenation, likely related to a number of factors including acute lung

    injury/ARDS, cardiogenic pulmonary edema, and possible bibasilar pneumonia. This is

    improved on increasing PEEP level which allowed successful decrease of the FiO2.

3. Successful extubation on June 17 with need for re-intubation because of impending

    respiratory failure, on 06/19/2020.

4. History of hypothyroidism.

5. History of ongoing tobacco use and nicotine addiction.

6. Mental status changes, likely related to septic encephalopathy.

7. CT scan of the brain which was negative.

8. Significant reduction in ejection fraction, thought to be related to apical

    ballooning.



PLAN:

The patient is remained remains on norepinephrine at 6.2 mcg/kg per minute.  The

patient is on TPN at 55 mL an hour, propofol at 30 mcg/kg per minute.  I did ask the

nurses to do a daily interruption of sedation.  In addition, an EEG will be done today.

She remains on Flagyl and Zosyn.  Vent settings are appropriate.  We will continue to

follow.  Prognosis is guarded.



Critical care time: 32 minutes.





MEGHA / BUCKN: [id number] / Job#: [id number]

## 2020-06-21 NOTE — PN
PROGRESS NOTE



The patient is seen for followup for acute kidney injury.  Her renal function has

improved significantly.  The patient developed volume overload and was reintubated.

She is currently maintained on IV Lasix at 40 mg Q 12 hours.  The patient's ejection

fraction on repeat echocardiogram was significantly lower as compared to the

echocardiogram done on Bianca 15.  The EF on Bianca 15 was about 60-65 percent and now it

was at 30-35 percent.  The patient remains on the vent.



PHYSICAL EXAMINATION:

Patient is sedated.  Blood pressure 103/65, heart rate 87 per minute.  She is afebrile.

Examination of the heart S1, S2.

Examination of the lungs, bilateral breath sounds are heard.

ABDOMEN:  Soft, distended.

Exam of lower extremities shows edema 1+ bilaterally.

CNS exam cannot be assessed.



LABS:

Show sodium 147, potassium 3.9, chloride 111, CO2 is 33, BUN 29, creatinine 0.4.



ASSESSMENT:

1. Acute kidney injury secondary to sepsis, hypotension, initially currently resolved.

2. Volume overload, maintained on IV Lasix, diuresing well.

3. Hypernatremia associated with free water deficit.  I will add free water down the

    feeding tube and increase it.

4. Hypoxic respiratory failure secondary to congestive heart failure.  The patient was

    reintubated 2 days ago.

5. Acute abdomen, status post explorative laparotomy, sigmoid resection and colostomy

    for perforated bowel.

6. Altered mentation with no acute findings on CT scan.



PLAN:

Continue with IV Lasix.  Increase free water down the feeding tube.  Repeat labs in

a.m.





MMODL / IJN: [id number] / Job#: [id number]

## 2020-06-21 NOTE — XR
EXAMINATION TYPE: XR chest 1V portable

 

DATE OF EXAM: 6/21/2020

 

HISTORY: Tube placement.

 

REFERENCE: Previous study dated 6/20/2020.

 

FINDINGS: The patient remains intubated. ET tube is in good position. The NG tube is pulled back and 
the last sidehole is at the GE junction. This should likely be advanced.. There is bibasilar airspace
 disease. This is improved slightly. There are small, bilateral effusions. The heart is not enlarged.


 

IMPRESSION: 

1. IMPROVED AERATION, BOTH LUNGS.

2. THE LAST SIDEHOLE OF THE ET TUBE IS AT THE LEVEL OF THE GE JUNCTION AND SHOULD LIKELY BE ADVANCED 
SLIGHTLY.

## 2020-06-21 NOTE — P.PN
Subjective


Progress Note Date: 06/21/20


This is 59-year-old female who has undergone surgery for ruptured sigmoid colon 

and had colectomy.  Patient has been having his present difficulties and has 

been intubated 2.  Recent EKGs and Cardec enzymes were abnormal and echo 

Cardigan showed findings consistent with apical ballooning syndrome with her low

ejection fraction.  Patient has been hypotensive.  We have initiated Lanoxin.  

Yesterday.  Her blood pressure is better and she is urinating better.  We'll 

going to start patient on Coreg.  If blood pressure remains stable we'll make 

add ACE inhibitor later on.  Meanwhile rest of the medication and treatment to 

be continued.  Patient is going to have EEG for assessment of the brain 

function.  Further recommendation depending upon the clinical course








Objective





- Vital Signs


Vital signs: 


                                   Vital Signs











Temp  99.1 F   06/21/20 08:00


 


Pulse  90   06/21/20 11:00


 


Resp  23   06/21/20 11:00


 


BP  122/90   06/21/20 11:00


 


Pulse Ox  98   06/21/20 11:00








                                 Intake & Output











 06/20/20 06/21/20 06/21/20





 18:59 06:59 18:59


 


Intake Total 5370.896 2832.627 664.693


 


Output Total 1785 2560 260


 


Balance -269.032 -1248.373 404.693


 


Weight 66.2 kg 77.6 kg 


 


Intake:   


 


  IV 1175 335 500


 


    Mvi, Adult No.4 with Vit 535 55 220





    K 10 ml Trace (Conc-1Ml/   





    Dose) 1 ml Potassium   





    Chloride 60 meq Potassium   





    Phosphate 20 mmol In   





    Amino Acid 5%-D15w+Lytes*   





    E* 1,000 ml @ 30 mls/hr   





    IV .Q24H ABIGAIL Rx#:   





    [id number]   


 


    Pipercillin/ Tazobactam 3  200 





    .375 g   


 


    Potassium Chloride 20 meq 100  





    In Water For Injection 1   





    100ml.bag @ 50 mls/hr   





    IVPB Q2H ABIGAIL Rx#:   





    [id number]   


 


    Sodium Chloride 0.45% 1, 240 80 40





    000 ml @ 20 mls/hr IV .   





    Q24H ABIGAIL Rx#:[id number]   


 


    Sodium Chloride 0.9% 1,00   40





    mL   


 


    Sodium Ferric Gluconat- 100  100





    Sucrose 125 mg In Sodium   





    Chloride 0.9% 100 ml @   





    100 mls/hr IVPB DAILY ABIGAIL   





    Rx#:[id number]   


 


    metroNIDAZOLE-NS  200  100





    mg   


 


  Intake, IV Titration 340.968 976.627 164.693





  Amount   


 


    Cisatracurium 200 mg In 121.676  





    Sodium Chloride 0.9% 180   





    ml @ 2 MCG/KG/MIN 7.944   





    mls/hr IV .Q24H On license of [facility] Rx#:   





    [id number]   


 


    Mvi, Adult No.4 with Vit  440 55





    K 10 ml Trace (Conc-1Ml/   





    Dose) 1 ml Potassium   





    Chloride 40 meq Potassium   





    Phosphate 15 mmol In   





    Amino Acid 5%-D15w+Lytes*   





    E* 1,000 ml @ 55 mls/hr   





    IV .[id number] On license of [facility] Rx#:   





    [id number]   


 


    Norepinephrine 8 mg In  91.891 





    Sodium Chloride 0.9% 250   





    ml @ 0.05 MCG/KG/MIN 6.   





    405 mls/hr IV .Q24H On license of [facility]   





    Rx#:[id number]   


 


    Potassium Chloride 20 meq  100 





    In Water For Injection 1   





    100ml.bag @ 50 mls/hr   





    IVPB ONCE STA Rx#:   





    [id number]   


 


    Propofol 1,000 mg In 164.346  





    Empty Bag 1 bag @ Titrate   





    IV .Q0M On license of [facility] Rx#:   





    [id number]   


 


    Propofol 1,000 mg In 54.946 184.736 109.693





    Empty Bag 1 bag @ Titrate   





    IV .Q0M On license of [facility] Rx#:   





    [id number]   


 


    Sodium Chloride 0.45% 1,  160 





    000 ml @ 20 mls/hr IV .   





    Q24H On license of [facility] Rx#:[id number]   


 


Output:   


 


  Drainage 100 130 


 


    Right Lower Abdomen 100 130 


 


  Urine 1685 2430 260


 


Other:   


 


  Voiding Method Indwelling Catheter Indwelling Catheter 








                       ABP, PAP, CO, CI - Last Documented











Arterial Blood Pressure        142/87

















- Exam





GENERAL EXAM: Patient is sedated and intubated


HEENT: Normocephalic. 


NECK: No masses, no nuchal rigidity.


CHEST: No chest wall deformity.


LUNGS: Diminished breath sounds


HEART: S1 and S2 normal with no audible mumurs or gallops. Regular rhythm, 

femorals equal on both sides..


ABDOMEN: No hepatosplenomegaly, normal bowel sounds, no guarding or rigidity.


SKIN: No rashes


CENTRAL NERVOUS SYSTEM: Patient is sedated








- Labs


CBC & Chem 7: 


                                 06/21/20 04:11





                                 06/21/20 04:11


Labs: 


                  Abnormal Lab Results - Last 24 Hours (Table)











  06/20/20 06/20/20 06/20/20 Range/Units





  11:38 17:10 17:10 


 


WBC     (3.8-10.6)  k/uL


 


RDW     (11.5-15.5)  %


 


Neutrophils # (Manual)     (1.3-7.7)  k/uL


 


Metamyelocytes # (Man)     (0)  k/uL


 


Myelocytes # (Manual)     (0)  k/uL


 


Nucleated RBCs     (0-0)  /100 WBC


 


ABG pH     (7.35-7.45)  


 


ABG pO2     ()  mmHg


 


ABG HCO3     (21-25)  mmol/L


 


ABG Total CO2     (19-24)  mmol/L


 


Sodium     (137-145)  mmol/L


 


Chloride     ()  mmol/L


 


Carbon Dioxide     (22-30)  mmol/L


 


BUN     (7-17)  mg/dL


 


Creatinine     (0.52-1.04)  mg/dL


 


Glucose     (74-99)  mg/dL


 


POC Glucose (mg/dL)  127 H  143 H   (75-99)  mg/dL


 


Calcium     (8.4-10.2)  mg/dL


 


Troponin I    0.204 H*  (0.000-0.034)  ng/mL


 


Total Protein     (6.3-8.2)  g/dL


 


Albumin     (3.5-5.0)  g/dL














  06/21/20 06/21/20 06/21/20 Range/Units





  01:42 04:11 04:11 


 


WBC    10.8 H  (3.8-10.6)  k/uL


 


RDW    16.8 H  (11.5-15.5)  %


 


Neutrophils # (Manual)    8.53 H  (1.3-7.7)  k/uL


 


Metamyelocytes # (Man)    0.11 H  (0)  k/uL


 


Myelocytes # (Manual)    0.11 H  (0)  k/uL


 


Nucleated RBCs    2 H  (0-0)  /100 WBC


 


ABG pH     (7.35-7.45)  


 


ABG pO2     ()  mmHg


 


ABG HCO3     (21-25)  mmol/L


 


ABG Total CO2     (19-24)  mmol/L


 


Sodium   147 H   (137-145)  mmol/L


 


Chloride   111 H   ()  mmol/L


 


Carbon Dioxide   33 H   (22-30)  mmol/L


 


BUN   29 H   (7-17)  mg/dL


 


Creatinine   0.44 L   (0.52-1.04)  mg/dL


 


Glucose   146 H   (74-99)  mg/dL


 


POC Glucose (mg/dL)  160 H    (75-99)  mg/dL


 


Calcium   8.1 L   (8.4-10.2)  mg/dL


 


Troponin I     (0.000-0.034)  ng/mL


 


Total Protein   4.7 L   (6.3-8.2)  g/dL


 


Albumin   2.2 L   (3.5-5.0)  g/dL














  06/21/20 06/21/20 06/21/20 Range/Units





  04:52 05:58 11:14 


 


WBC     (3.8-10.6)  k/uL


 


RDW     (11.5-15.5)  %


 


Neutrophils # (Manual)     (1.3-7.7)  k/uL


 


Metamyelocytes # (Man)     (0)  k/uL


 


Myelocytes # (Manual)     (0)  k/uL


 


Nucleated RBCs     (0-0)  /100 WBC


 


ABG pH  7.50 H    (7.35-7.45)  


 


ABG pO2  79 L    ()  mmHg


 


ABG HCO3  35 H    (21-25)  mmol/L


 


ABG Total CO2  36 H    (19-24)  mmol/L


 


Sodium     (137-145)  mmol/L


 


Chloride     ()  mmol/L


 


Carbon Dioxide     (22-30)  mmol/L


 


BUN     (7-17)  mg/dL


 


Creatinine     (0.52-1.04)  mg/dL


 


Glucose     (74-99)  mg/dL


 


POC Glucose (mg/dL)   137 H  128 H  (75-99)  mg/dL


 


Calcium     (8.4-10.2)  mg/dL


 


Troponin I     (0.000-0.034)  ng/mL


 


Total Protein     (6.3-8.2)  g/dL


 


Albumin     (3.5-5.0)  g/dL








                      Microbiology - Last 24 Hours (Table)











 06/19/20 05:10 Blood Culture - Preliminary





 Blood    No Growth after 48 hours


 


 06/15/20 16:30 Blood Culture - Preliminary





 Blood    No Growth after 120 hours


 


 06/15/20 16:25 Blood Culture - Preliminary





 Blood    No Growth after 120 hours


 


 06/19/20 18:00 Gram Stain - Preliminary





 Sputum Sputum Culture - Preliminary














Assessment and Plan


(1) Apical ballooning syndrome


Current Visit: Yes   Status: Acute   Code(s): I51.81 - TAKOTSUBO SYNDROME   

SNOMED Code(s): 156589588


   





(2) Perforated sigmoid colon


Current Visit: Yes   Status: Acute   Code(s): K63.1 - PERFORATION OF INTESTINE 

(NONTRAUMATIC)   SNOMED Code(s): 017294803


   





(3) Status post colectomy


Current Visit: Yes   Status: Acute   Code(s): Z90.49 - ACQUIRED ABSENCE OF OTHER

SPECIFIED PARTS OF DIGESTIVE TRACT   SNOMED Code(s): 384627977


   





(4) History of gastric bypass


Current Visit: Yes   Status: Acute   Code(s): Z98.84 - BARIATRIC SURGERY STATUS 

 SNOMED Code(s): 665819356


   





(5) Hypothyroidism


Current Visit: Yes   Status: Acute   Code(s): E03.9 - HYPOTHYROIDISM, 

UNSPECIFIED   SNOMED Code(s): 68767558


   





(6) Congestive heart failure


Current Visit: Yes   Status: Acute   Code(s): I50.9 - HEART FAILURE, UNSPECIFIED

  SNOMED Code(s): 32651868


   


Plan: 


Continue current medical therapy.  Add beta blocker.  We'll also add ACE 

inhibitor as tolerated.  Prognosis still guarded

## 2020-06-22 LAB
ALBUMIN SERPL-MCNC: 2.3 G/DL (ref 3.5–5)
ALP SERPL-CCNC: 79 U/L (ref 38–126)
ALT SERPL-CCNC: 19 U/L (ref 4–34)
ANION GAP SERPL CALC-SCNC: 2 MMOL/L
AST SERPL-CCNC: 33 U/L (ref 14–36)
BUN SERPL-SCNC: 29 MG/DL (ref 7–17)
CALCIUM SPEC-MCNC: 8.1 MG/DL (ref 8.4–10.2)
CELLS COUNTED: 100
CHLORIDE SERPL-SCNC: 108 MMOL/L (ref 98–107)
CO2 BLDA-SCNC: 35 MMOL/L (ref 19–24)
CO2 SERPL-SCNC: 32 MMOL/L (ref 22–30)
EOSINOPHIL # BLD MANUAL: 0.33 K/UL (ref 0–0.7)
ERYTHROCYTE [DISTWIDTH] IN BLOOD BY AUTOMATED COUNT: 3.74 M/UL (ref 3.8–5.4)
ERYTHROCYTE [DISTWIDTH] IN BLOOD: 16.9 % (ref 11.5–15.5)
GLUCOSE BLD-MCNC: 112 MG/DL (ref 75–99)
GLUCOSE BLD-MCNC: 131 MG/DL (ref 75–99)
GLUCOSE BLD-MCNC: 134 MG/DL (ref 75–99)
GLUCOSE SERPL-MCNC: 133 MG/DL (ref 74–99)
HCO3 BLDA-SCNC: 34 MMOL/L (ref 21–25)
HCT VFR BLD AUTO: 36.5 % (ref 34–46)
HGB BLD-MCNC: 11.8 GM/DL (ref 11.4–16)
LYMPHOCYTES # BLD MANUAL: 1.44 K/UL (ref 1–4.8)
MAGNESIUM SPEC-SCNC: 2.1 MG/DL (ref 1.6–2.3)
MCH RBC QN AUTO: 31.5 PG (ref 25–35)
MCHC RBC AUTO-ENTMCNC: 32.2 G/DL (ref 31–37)
MCV RBC AUTO: 97.7 FL (ref 80–100)
METAMYELOCYTES # BLD: 0.33 K/UL
MONOCYTES # BLD MANUAL: 0.67 K/UL (ref 0–1)
NEUTROPHILS NFR BLD MANUAL: 69 %
NEUTS SEG # BLD MANUAL: 8.3 K/UL (ref 1.3–7.7)
PCO2 BLDA: 43 MMHG (ref 35–45)
PH BLDA: 7.51 [PH] (ref 7.35–7.45)
PLATELET # BLD AUTO: 247 K/UL (ref 150–450)
PO2 BLDA: 100 MMHG (ref 83–108)
PO2 BLDA: 52 MMHG (ref 83–108)
POTASSIUM SERPL-SCNC: 4 MMOL/L (ref 3.5–5.1)
PROT SERPL-MCNC: 4.8 G/DL (ref 6.3–8.2)
SODIUM SERPL-SCNC: 142 MMOL/L (ref 137–145)
VIT B1 BLD-MCNC: 50 UG/L (ref 38–122)
WBC # BLD AUTO: 11.1 K/UL (ref 3.8–10.6)

## 2020-06-22 RX ADMIN — AMPICILLIN SODIUM AND SULBACTAM SODIUM SCH MLS/HR: 2; 1 INJECTION, POWDER, FOR SOLUTION INTRAMUSCULAR; INTRAVENOUS at 12:03

## 2020-06-22 RX ADMIN — METRONIDAZOLE SCH MLS/HR: 500 INJECTION, SOLUTION INTRAVENOUS at 06:30

## 2020-06-22 RX ADMIN — AMPICILLIN SODIUM AND SULBACTAM SODIUM SCH MLS/HR: 2; 1 INJECTION, POWDER, FOR SOLUTION INTRAMUSCULAR; INTRAVENOUS at 23:12

## 2020-06-22 RX ADMIN — METRONIDAZOLE SCH MLS/HR: 500 INJECTION, SOLUTION INTRAVENOUS at 12:03

## 2020-06-22 RX ADMIN — SODIUM BICARBONATE SCH: 84 INJECTION, SOLUTION INTRAVENOUS at 18:47

## 2020-06-22 RX ADMIN — INSULIN ASPART SCH UNIT: 100 INJECTION, SOLUTION INTRAVENOUS; SUBCUTANEOUS at 12:03

## 2020-06-22 RX ADMIN — CARVEDILOL SCH: 3.12 TABLET, FILM COATED ORAL at 10:35

## 2020-06-22 RX ADMIN — METRONIDAZOLE SCH MLS/HR: 500 INJECTION, SOLUTION INTRAVENOUS at 17:41

## 2020-06-22 RX ADMIN — INSULIN ASPART SCH UNIT: 100 INJECTION, SOLUTION INTRAVENOUS; SUBCUTANEOUS at 05:52

## 2020-06-22 RX ADMIN — SODIUM CHLORIDE SCH MG: 900 INJECTION, SOLUTION INTRAVENOUS at 21:11

## 2020-06-22 RX ADMIN — NOREPINEPHRINE BITARTRATE SCH MLS/HR: 1 INJECTION, SOLUTION, CONCENTRATE INTRAVENOUS at 19:53

## 2020-06-22 RX ADMIN — PROPOFOL SCH MLS/HR: 10 INJECTION, EMULSION INTRAVENOUS at 03:51

## 2020-06-22 RX ADMIN — PROPOFOL SCH MLS/HR: 10 INJECTION, EMULSION INTRAVENOUS at 18:06

## 2020-06-22 RX ADMIN — DIGOXIN SCH MCG: 0.25 INJECTION INTRAMUSCULAR; INTRAVENOUS at 08:43

## 2020-06-22 RX ADMIN — LEVOTHYROXINE SODIUM ANHYDROUS SCH MCG: 100 INJECTION, POWDER, LYOPHILIZED, FOR SOLUTION INTRAVENOUS at 09:16

## 2020-06-22 RX ADMIN — CHLORHEXIDINE GLUCONATE SCH ML: 1.2 RINSE ORAL at 08:42

## 2020-06-22 RX ADMIN — METRONIDAZOLE SCH MLS/HR: 500 INJECTION, SOLUTION INTRAVENOUS at 00:00

## 2020-06-22 RX ADMIN — HEPARIN SODIUM SCH UNIT: 5000 INJECTION, SOLUTION INTRAVENOUS; SUBCUTANEOUS at 08:43

## 2020-06-22 RX ADMIN — PROPOFOL SCH MLS/HR: 10 INJECTION, EMULSION INTRAVENOUS at 22:30

## 2020-06-22 RX ADMIN — CHLORHEXIDINE GLUCONATE SCH ML: 1.2 RINSE ORAL at 21:11

## 2020-06-22 RX ADMIN — PROPOFOL SCH MLS/HR: 10 INJECTION, EMULSION INTRAVENOUS at 12:57

## 2020-06-22 RX ADMIN — LEUCINE, PHENYLALANINE, LYSINE, METHIONINE, ISOLEUCINE, VALINE, HISTIDINE, THREONINE, TRYPTOPHAN, ALANINE, GLYCINE, ARGININE, PROLINE, SERINE, TYROSINE, DEXTROSE SCH MLS/HR: 365; 280; 290; 200; 300; 290; 240; 210; 90; 1035; 515; 575; 340; 250; 20; 15 INJECTION INTRAVENOUS at 08:42

## 2020-06-22 RX ADMIN — METRONIDAZOLE SCH MLS/HR: 500 INJECTION, SOLUTION INTRAVENOUS at 23:11

## 2020-06-22 RX ADMIN — SODIUM CHLORIDE SCH MG: 900 INJECTION, SOLUTION INTRAVENOUS at 09:16

## 2020-06-22 RX ADMIN — INSULIN ASPART SCH: 100 INJECTION, SOLUTION INTRAVENOUS; SUBCUTANEOUS at 19:27

## 2020-06-22 RX ADMIN — AMPICILLIN SODIUM AND SULBACTAM SODIUM SCH MLS/HR: 2; 1 INJECTION, POWDER, FOR SOLUTION INTRAMUSCULAR; INTRAVENOUS at 05:53

## 2020-06-22 RX ADMIN — PROPOFOL SCH MLS/HR: 10 INJECTION, EMULSION INTRAVENOUS at 00:00

## 2020-06-22 RX ADMIN — AMPICILLIN SODIUM AND SULBACTAM SODIUM SCH MLS/HR: 2; 1 INJECTION, POWDER, FOR SOLUTION INTRAMUSCULAR; INTRAVENOUS at 18:16

## 2020-06-22 RX ADMIN — HEPARIN SODIUM SCH UNIT: 5000 INJECTION, SOLUTION INTRAVENOUS; SUBCUTANEOUS at 21:11

## 2020-06-22 RX ADMIN — SPIRONOLACTONE SCH MG: 25 TABLET, FILM COATED ORAL at 09:16

## 2020-06-22 RX ADMIN — PROPOFOL SCH MLS/HR: 10 INJECTION, EMULSION INTRAVENOUS at 08:42

## 2020-06-22 RX ADMIN — NOREPINEPHRINE BITARTRATE SCH MLS/HR: 1 INJECTION, SOLUTION, CONCENTRATE INTRAVENOUS at 00:30

## 2020-06-22 RX ADMIN — PANTOPRAZOLE SODIUM SCH MG: 40 INJECTION, POWDER, FOR SOLUTION INTRAVENOUS at 09:16

## 2020-06-22 NOTE — XR
EXAMINATION TYPE: XR chest 1V portable

 

DATE OF EXAM: 6/22/2020

 

COMPARISON: 6/21/2020

 

INDICATION: Tube placement

 

TECHNIQUE: Single frontal view of the chest is obtained.

 

FINDINGS:  

The heart size is normal.  

The pulmonary vasculature is normal.  

Mild left lower lobe infiltrate appears to be present, unchanged from comparison. There is blunting t
he right costophrenic angle. Small right pleural effusion has developed. Previous right basilar atele
ctasis may be resolved.

 

Endotracheal tube tip is above the kishor. Nasogastric tube transverses the thorax. Right central manny
ous catheter tip is in the superior vena cava region.  

 

 

IMPRESSION:  

1. Mild stable left lower lobe infiltrate.

2. Developing small right pleural effusion

## 2020-06-22 NOTE — CDI
Documentation Clarification Form



Date: 06/22/2020 09:17:01 AM

From: Kathryn Montgomery RN, CCDS

Phone: [phone]

MRN: [id number]

Admit Date: 06/14/2020 11:56:00 AM

Patient Name: Anna Carter

Visit Number: [id number]



ATTENTION: The Clinical Documentation Specialists (CDI) and [facility] Coding Staff 
appreciate your assistance in clarifying documentation. Please respond to the 
clarification below the line at the bottom and electronically sign. The CDI & 
[facility] Coding staff will review the response and follow-up if needed. Please note: 
Queries are made part of the Legal Health Record. If you have any questions, 
please contact the author of this message via ITS.



Dr. Chris Zavala



CHF is documented in the Cardiology Consult and Progress Notes and requires 
further specificity. 



Current History/Risk Factors:

VANNESSA, Sepsis, Hypotension, Hypernatremia, perforated bowel w resection, ALI and 
ARDS, no documented previous cardiac HX found



Clinical Indicators: 

6/20& 6/21 Cardiology Consult and progress note: Apical ballooning syndrome, 
Congestive heart failure."

6/21 Nephrology Progress Note: "Hypoxic respiratory failure secondary to 
congestive heart failure."

6/22 0830 VS/Pulse OX: Temp 98, HR 83, RR 18 B/P 99/71, AB/P 91/78 (patient 
maintained on Levophed), Spo2 100% on 40% FIO2 MV

BNP: not checked this admission

6/20 Echocardiogram Results: EF 30-35%, apical ballooning syndrome, Hypokinesis 
Apical lateral, apical inferior, apical septum LV Wall,

6/22 Chest X Ray: Mild stable left lower lobe infiltrate.2.Developing small 
right pleural effusion 



Treatment:  

Aldactone 50mg PO QD

6/15-6/16 Lasix Gtt @ 5mg/hr

6/17-6/20 Lasix 40 mg IVP QD

6/20-6/21 Lasix 40 mg IVP Q 12

Coreg 1.563 mg PO BID

Dig 125 mcg IVP QD

Levophed Gtt- titrate for B/P



In your professional opinion, can you please clarify the acuity and type of CHF 
if known?



Systolic Heart Failure:

 Acute 

 Chronic

 Acute on Chronic  



Systolic & Diastolic Heart Failure:

 Acute 

 Chronic

 Acute on Chronic Heart Failure



Unable to Determine

Other, please specify________________



(Last Revision: April 2018)

___________________________________________________________________________



Acute systolic heart failure

MTDD

## 2020-06-22 NOTE — CDI
Documentation Clarification Form



Date: 06/16/2020 03:51:00 PM

From: Kathryn Montgomery RN, CCDS

Phone: [phone]

MRN: [id number]

Admit Date: 06/14/2020 11:56:00 AM

Patient Name: Anna Carter

Visit Number: [id number]



ATTENTION: The Clinical Documentation Specialists (CDI) and [facility] Coding Staff 
appreciate your assistance in clarifying documentation. Please respond to the 
clarification below the line at the bottom and electronically sign. The CDI & 
[facility] Coding staff will review the response and follow-up if needed. Please note: 
Queries are made part of the Legal Health Record. If you have any questions, 
please contact the author of this message via ITS.



Dr. Colton Aviles



Post-operative Hypotension is documented in the cardiology notes and surgical 
progress notes and requires further clarification as the patient continues on 
vasopressors several days after surgical procedure.



History/Risk Factors:

Ruptured sigmoid colon, fecal peritonitis, descending and sigmoid colon 
impaction, appendicolith with appendicitis 



Clinical Indicators: 

6/15 Surgery: "Patient remains hypotensive at the time of examination."

6/15 Cardiology Consult: "postoperative hypotension"

6/16 Cardiology progress note: "Hypotension post surgery."

6/15 [id number] Patients ABP: 91/62, 79/58, 89/51, 87/60    

6/20 Nephrology Progress note:  "Acute kidney injury, acute tubular necrosis, 
secondary to hypotension/hypoperfusion, currently improved.      

6/22 Nephrology progress note: Apical ballooning syndrome with ejection 
fraction of 30-35%.."



Treatment:

6/15 Levophed Gtt titrate for B/P

6/15 3L 0.9% NS IIVF Bolus



In your professional opinion, can you please specify the etiology of the 
hypotension if known?



Hypovolemic shock

Septic Shock

Cardiogenic Shock

Drug Induced Hypotension (please identify drug)

Postoperative Hypotension (please specify if this is expected or unexpected in 
the post-operative period)

Other Condition, please specify ______

Unable to determine



(Last Revision: October 2017)

___________________________________________________________________________



Septic Shock

[facility]

## 2020-06-22 NOTE — CDI
Documentation Clarification Form



Date: 06/22/2020 10:12:13 AM

From: Kathryn Montgomery RN, CCDS

Phone: [phone]

MRN: [id number]

Admit Date: 06/14/2020 11:56:00 AM

Patient Name: Anna Carter

Visit Number: [id number]



ATTENTION: The Clinical Documentation Specialists (CDI) and [facility] Coding Staff 
appreciate your assistance in clarifying documentation. Please respond to the 
clarification below the line at the bottom and electronically sign. The CDI & 
[facility] Coding staff will review the response and follow-up if needed. Please note: 
Queries are made part of the Legal Health Record. If you have any questions, 
please contact the author of this message via ITS.



Dr. Socorro Reed



Patient has been noted to be NPO since admission and has been maintained on TPN 
since 6/17.  Please provide clinical significance.



History/Risk Factors: 

Sigmoid colon perf s/p sigmoid resection with colostomy with peritonitis, 
Sepsis, Hypotension, Acute hypoxic respiratory failure with extubation and re-
intubation, VANNESSA with ATN



Clinical Indicators:  

6/15 Surgical Progress Note: "Will consider TPN if patient unable to be 
extubated within the day or two."

6/17 Surgery Progress Note: "Orders placed to begin TPN today."

6/21 Surgical Progress Note: "Continue TPN and nothing by mouth."

6/18 Labs: Vitamin A 16, Vitamin B-12 1457.0, Vitamin D 25 Hydroxy 24.6, TSH 
6.86, Free T4 .57, Phos 1.2, Mag 2.4, % iron saturation 2.99, total protein 4.1,
Albumin 2

Current BMI: 27.6

Insufficient energy intake: Patient remains NPO on mechanical ventilation with 
TPN and Lipids infusing

Weight Loss: gastric bypass 15 yrs ago

6/2 Nephrology progress Note:  Fluid accumulation:"Exam of lower extremities 
shows edema 1+ bilaterally."

Decreased hand  strength: Patient is currently sedated on Diprovan Gtt



Treatment:

Dietary Consult: Completed, TPN recommendations made

Supplements: Vitamin B-1 IBP Q 12 hrs

TPN & Lipids/Diprovan per dietary recs

Lab monitoring: Am daily



In your professional opinion, can you please clarify if these findings signify 
one of the following conditions? 



Mild Protein-Calorie Malnutrition 

Moderate Protein-Calorie Malnutrition

Severe Protein-Calorie Malnutrition

Other condition, please specify ___________________

Unable to determine



(Last Revision: July 2019)

___________________________________________________________________________

Severe Protein-Calorie Malnutrition



 6/22/20 14:58

MTDD

## 2020-06-22 NOTE — CDI
Documentation Clarification Form



Date: 06/22/2020 09:54:21 AM

From: Kathryn Montgomery RN, CCDS

Phone: [phone]

MRN: [id number]

Admit Date: 06/14/2020 11:56:00 AM

Patient Name: Anna Carter

Visit Number: [id number]



ATTENTION: The Clinical Documentation Specialists (CDI) and [facility] Coding Staff 
appreciate your assistance in clarifying documentation. Please respond to the 
clarification below the line at the bottom and electronically sign. The CDI & 
[facility] Coding staff will review the response and follow-up if needed. Please note: 
Queries are made part of the Legal Health Record. If you have any questions, 
please contact the author of this message via ITS.



Dr. Socorro Reed



Please render your opinion on the clinical significance of the patients 
hemoglobin/hematocrit levels as they have been steadily declining.

History/Risk Factors: 

Sepsis, VANNESSA with ATN, ALI/ARDS, perforated sigmoid diverticulitis with sigmoid 
resection and colostomy



Clinical indicators: 

6/14-6/22 HGB: 16.2/17.6/15.3/14.4/11.2/12.9/11.5/12.5/11.8

6/14-6/22 HCT: 50.4/55.5/49.1/39.8/34.3/12.9/11.5/12.8/11.8

6/20 Nephrology Progress Note: "Iron deficiency, maintained on IV iron."

6/14 OR Rep: EBL 75 cc



Treatment:     

6/19 Ferric Sodium gluconate 125mg IVPB x 3 bags    

   

In order to capture the severity of condition, please clarify if the 
labs/clinical indicators signify:



Acute blood loss anemia

Acute on chronic blood loss anemia

Iron deficiency anemia

Nutritional anemia

Anemia of chronic disease

Unable to determine

Other, please specify ___________



(Last Form Revision: March 2020)

___________________________________________________________________________



Labs consistent with pre-existing iron deficiency anemia with patient's history 
of gastric bypass, pre-existing comorbidity of the patient



KM 6/22/20 14:56

MANDIED

## 2020-06-22 NOTE — PN
PROGRESS NOTE



DATE OF SERVICE:

06/22/2020



REASON FOR FOLLOWUP:

Abdominal abscess from perforated sigmoid diverticulitis.



INTERVAL HISTORY:

Patient is currently afebrile, has been breathing.  The patient is hemodynamically

stable.  FiO2 is currently at 40%, not on any pressor support.  The patient is slightly

more awake and follows. No other change reported by nursing staff.



PHYSICAL EXAMINATION:

Blood pressure _____/81 with a pulse of 83, temperature 99.4.  She is 98% on 40% FiO2.

General description is a middle-aged female intubated on the vent.

RESPIRATORY SYSTEM: Unlabored breathing with decreased breath sounds at the base. No

wheeze.

HEART: S1, S2.  Regular rate and rhythm.

ABDOMEN: Soft. No tenderness.



LABS:

Hemoglobin 11.8, white count 11.1, BUN of 29, creatinine 0.42.



DIAGNOSTIC IMPRESSION AND PLAN:

Patient with abdominal abscess from a perforated _____ status post laparotomy and

diverting colostomy. Abdominal culture positive predominantly with anaerobes and

patient is covered with Unasyn 3 grams q.6 hours; to continue. Will monitor clinical

course closely.





MMODL / IJN: [id number] / Job#: [id number]

## 2020-06-22 NOTE — EEG
ELECTROENCEPHALOGRAM REPORT



DATE OF PROCEDURE:

06/21/2020



ELECTROENCEPHALOGRAM (EEG) REPORT:

TECHNIQUE:  A routine 18-channel EEG was performed with video using the 10/20

international electrode placement system.



HISTORY:  Intraperitoneal free air, mental status change. Patient brought to the ER

with abdominal pain.  The patient had obstruction and underwent laparoscopy.



CURRENT MEDICATIONS:  Aldactone, propofol, Protonix, norepinephrine and Narcan.



STUDY DURATION: 30 minutes.



FINDINGS:

BACKGROUND:  The background activity consisted of unsustained 5-6 hertz waveforms.



ACTIVATION:

Hyperventilation:  Not performed.

Photic stimulation:  Mild symmetric driving seen.

Sleep:  Drowsy.



ABNORMALITIES: Diffuse synchronous and asynchronous 3-6 hertz slow-wave activity was

seen, more frontally predominant.



Please note that a mild excess of beta frequency activity was noted.  This is not

epileptiform in nature and may in part be due to medication effect.



IMPRESSION:

Abnormal EEG.  The diffuse synchronous and asynchronous theta delta range slowing

mentioned above is not epileptiform in nature.  In combination with the slow

background, these findings indicate moderate diffuse cerebral dysfunction which may in

part be due to medication effect.  No seizures were recorded.  No epileptiform activity

was present.





MMODL / IJN: [id number] / Job#: [id number]

## 2020-06-22 NOTE — P.PN
Subjective


Progress Note Date: 06/22/20





On today's evaluation of 06/22/2020, I'm seeing this patient for a follow-up in 

the intensive care unit.  The patient remains intubated on a mechanical 

ventilator.  The patient is currently sedated and the patient is receiving 

propofol at a dose of 50 mcg/kg per minute.  The patient is on IV fluids at 0.45

saline at the rate of 20 mL an hour.  The patient is on TPN for nutritional 

support running at 55 mL an hour and the patient is on norepinephrine infusion 

at 8.8 micrograms per minutes.  In terms of vent support, the patient remains on

a mechanical ventilator.  She is an assist-control mode with a rate of 20 with a

tidal volume of 350 and FiO2 of 50% with a PEEP of 10.  Earlier blood gases 

showed a pH of 7.5 with a pCO2 of 43 and pO2 of 100.  Necessity ventilator 

changes were done.  Noted the patient is post perforated sigmoid colon and the 

patient is postop day #8.  She had to be reintubated on 06/19/2020 acute hypoxic

respiratory failure and development of bilateral pulmonary infiltrates.  The 

intra-abdominal cultures are showing anaerobic gram-negative bacillus 2 and 

Clostridium perfringens.  Antibiotic coverage remains IV Unasyn and Flagyl for 

now.  The patient on TPN for nutritional support.  The chest x-ray from today 

shows mild stable left lower lobe pulmonary infiltrate.  There is also small 

right sided pleural effusion.  There is blunting of the right costophrenic 

angle.  Small right-sided pleural effusion developed.  ET tube is in a good 

location for now.





Objective





- Vital Signs


Vital signs: 


                                   Vital Signs











Temp  98.2 F   06/22/20 12:00


 


Pulse  81   06/22/20 14:00


 


Resp  19   06/22/20 14:00


 


BP  121/84   06/22/20 14:00


 


Pulse Ox  98   06/22/20 14:00








                                 Intake & Output











 06/21/20 06/22/20 06/22/20





 18:59 06:59 18:59


 


Intake Total 7488.306 9789.861 2455.654


 


Output Total 1515 1415 865


 


Balance 61.398 542.163 1588.654


 


Weight 77.6 kg 80 kg 


 


Intake:   


 


  IV 1225 1345 1100


 


    Mvi, Adult No.4 with Vit 440  





    K 10 ml Trace (Conc-1Ml/   





    Dose) 1 ml Potassium   





    Chloride 60 meq Potassium   





    Phosphate 20 mmol In   





    Amino Acid 5%-D15w+Lytes*   





    E* 1,000 ml @ 30 mls/hr   





    IV .Q24H [facility] Rx#:   





    [id number]   


 


    Mvi, Adult No.4 with Vit 165 605 440





    K 10 ml Trace (Conc-1Ml/   





    Dose) 1 ml Potassium   





    Chloride 60 meq Potassium   





    Phosphate 30 mmol   





    Magnesium Sulfate gm 0.6   





    gm Calcium Gluconate 1 gm   





    In Amino Acid 5%-D15w 1,   





    000 ml @ 55 mls/hr IV .   





    [id number] [facility] Rx#:[id number]   


 


    Pipercillin/ Tazobactam 3 100 200 





    .375 g   


 


    Sodium Chloride 0.45% 1, 110 190 160





    000 ml @ 20 mls/hr IV .   





    Q24H [facility] Rx#:[id number]   


 


    Sodium Chloride 0.9% 1,00 110 50 





    mL   


 


    Sodium Ferric Gluconat- 100  





    Sucrose 125 mg In Sodium   





    Chloride 0.9% 100 ml @   





    100 mls/hr IVPB DAILY [facility]   





    Rx#:[id number]   


 


    metroNIDAZOLE-NS  200 300 500





    mg   


 


  Intake, IV Titration 351.398 531.207 0407.654





  Amount   


 


    Mvi, Adult No.4 with Vit 55  





    K 10 ml Trace (Conc-1Ml/   





    Dose) 1 ml Potassium   





    Chloride 40 meq Potassium   





    Phosphate 15 mmol In   





    Amino Acid 5%-D15w+Lytes*   





    E* 1,000 ml @ 55 mls/hr   





    IV .[id number] [facility] Rx#:   





    [id number]   


 


    Mvi, Adult No.4 with Vit   1062.2





    K 10 ml Trace (Conc-1Ml/   





    Dose) 1 ml Potassium   





    Chloride 60 meq Potassium   





    Phosphate 30 mmol   





    Magnesium Sulfate gm 0.6   





    gm Calcium Gluconate 1 gm   





    In Amino Acid 5%-D15w 1,   





    000 ml @ 55 mls/hr IV .   





    [id number] [facility] Rx#:[id number]   


 


    Norepinephrine 8 mg In 105.042 125.41 112.728





    Sodium Chloride 0.9% 250   





    ml @ 0.05 MCG/KG/MIN 6.   





    405 mls/hr IV .Q24H [facility]   





    Rx#:[id number]   


 


    Propofol 1,000 mg In 191.356 172.451 180.726





    Empty Bag 1 bag @ Titrate   





    IV .Q0M [facility] Rx#:   





    [id number]   


 


Output:   


 


  Urine 1515 1395 865


 


  Stool  20 


 


Other:   


 


  Voiding Method Indwelling Catheter Indwelling Catheter Indwelling Catheter








                       ABP, PAP, CO, CI - Last Documented











Arterial Blood Pressure        80/63

















- Exam








GENERAL: Well-developed in no acute distress, intubated on mechanical ventilated

and the patient was sedated for now.  Orogastric and orotracheal tube are both 

in place.


HEENT:  No sclera icterus. Extraocular movements grossly intact.  Moist buccal 

mucosa. 


Head is atraumatic, normocephalic. No nasal drainage.


NECK:  Supple without lymphadenopathy.


CHEST:  Non-labored respirations and equal bilateral excursions


CARDIOVASCULAR:  Tachycardic.  Regular rate with regular rhythm.  Palpable 2+ 

radial pulses.


ABDOMEN:  Soft.  Nondistended. PREVENA wound vac noted. Ostomy to left side of 

abdomen. No stool or gas noted. Stoma pink. TERESSA drain with serous drainage.


MUSCULOSKELETAL:  No clubbing or cyanosis.


NEUROLOGIC:  Slightly more lethargic compared to yesterday.


PSYCH:  Unable to assess secondary to altered mental status


SKIN: Well perfused.  Good skin turgor. 


 





- Labs


CBC & Chem 7: 


                                 06/22/20 03:55





                                 06/22/20 03:55


Labs: 


                  Abnormal Lab Results - Last 24 Hours (Table)











  06/18/20 06/19/20 06/21/20 Range/Units





  11:39 06:35 19:06 


 


WBC     (3.8-10.6)  k/uL


 


RBC     (3.80-5.40)  m/uL


 


RDW     (11.5-15.5)  %


 


Neutrophils # (Manual)     (1.3-7.7)  k/uL


 


Metamyelocytes # (Man)     (0)  k/uL


 


ABG pH     (7.35-7.45)  


 


ABG pO2   52 L*   ()  mmHg


 


ABG HCO3     (21-25)  mmol/L


 


ABG Total CO2     (19-24)  mmol/L


 


ABG O2 Saturation     (94-97)  %


 


Chloride     ()  mmol/L


 


Carbon Dioxide     (22-30)  mmol/L


 


BUN     (7-17)  mg/dL


 


Creatinine     (0.52-1.04)  mg/dL


 


Glucose     (74-99)  mg/dL


 


POC Glucose (mg/dL)    127 H  (75-99)  mg/dL


 


Calcium     (8.4-10.2)  mg/dL


 


Total Protein     (6.3-8.2)  g/dL


 


Albumin     (3.5-5.0)  g/dL


 


Vitamin A  16 L    ()  ug/dL














  06/21/20 06/22/20 06/22/20 Range/Units





  23:51 03:55 03:55 


 


WBC    11.1 H  (3.8-10.6)  k/uL


 


RBC    3.74 L  (3.80-5.40)  m/uL


 


RDW    16.9 H  (11.5-15.5)  %


 


Neutrophils # (Manual)    8.30 H  (1.3-7.7)  k/uL


 


Metamyelocytes # (Man)    0.33 H  (0)  k/uL


 


ABG pH     (7.35-7.45)  


 


ABG pO2     ()  mmHg


 


ABG HCO3     (21-25)  mmol/L


 


ABG Total CO2     (19-24)  mmol/L


 


ABG O2 Saturation     (94-97)  %


 


Chloride   108 H   ()  mmol/L


 


Carbon Dioxide   32 H   (22-30)  mmol/L


 


BUN   29 H   (7-17)  mg/dL


 


Creatinine   0.42 L   (0.52-1.04)  mg/dL


 


Glucose   133 H   (74-99)  mg/dL


 


POC Glucose (mg/dL)  157 H    (75-99)  mg/dL


 


Calcium   8.1 L   (8.4-10.2)  mg/dL


 


Total Protein   4.8 L   (6.3-8.2)  g/dL


 


Albumin   2.3 L   (3.5-5.0)  g/dL


 


Vitamin A     ()  ug/dL














  06/22/20 06/22/20 06/22/20 Range/Units





  05:04 05:49 11:48 


 


WBC     (3.8-10.6)  k/uL


 


RBC     (3.80-5.40)  m/uL


 


RDW     (11.5-15.5)  %


 


Neutrophils # (Manual)     (1.3-7.7)  k/uL


 


Metamyelocytes # (Man)     (0)  k/uL


 


ABG pH  7.51 H    (7.35-7.45)  


 


ABG pO2     ()  mmHg


 


ABG HCO3  34 H    (21-25)  mmol/L


 


ABG Total CO2  35 H    (19-24)  mmol/L


 


ABG O2 Saturation  97.7 H    (94-97)  %


 


Chloride     ()  mmol/L


 


Carbon Dioxide     (22-30)  mmol/L


 


BUN     (7-17)  mg/dL


 


Creatinine     (0.52-1.04)  mg/dL


 


Glucose     (74-99)  mg/dL


 


POC Glucose (mg/dL)   134 H  131 H  (75-99)  mg/dL


 


Calcium     (8.4-10.2)  mg/dL


 


Total Protein     (6.3-8.2)  g/dL


 


Albumin     (3.5-5.0)  g/dL


 


Vitamin A     ()  ug/dL








                      Microbiology - Last 24 Hours (Table)











 06/19/20 18:00 Gram Stain - Final





 Sputum Sputum Culture - Final


 


 06/19/20 05:10 Blood Culture - Preliminary





 Blood    No Growth after 72 hours


 


 06/15/20 16:30 Blood Culture - Final





 Blood    No Growth after 144 hours


 


 06/15/20 16:25 Blood Culture - Final





 Blood    No Growth after 144 hours














Assessment and Plan


Plan: 








1 ruptured sigmoid colon with fecal peritonitis, along with descending and 

sigmoid colon impaction and appendicolith and appendicitis.  The patient 

underwent expose a laparotomy with sigmoid colectomy and descending colostomy 

creation and appendectomy.  Surgery was done on 06/14/2020.





2 acute hypoxic respiratory failure and the patient had to be reintubated on 

06/19/2024 hypoxic respiratory failure and development of breath and pulmonary 

infiltrates.  Consider an early acute lung injury/ARDS.  Pulmonary infiltrate is

improving and the patient's saturations improved with improvement in airway 

pressures.  Chest x-ray and the vent settings were all noted.





3 sepsis with secondary hypotension secondary to above.  The patient is still 

requiring pressors in addition to broad-spectrum antibiotics.  Into abdominal 

culture are all anaerobes including anaerobic gram-negative bacillus and 

Clostridium perfringens.





4 Hypothyroidism





5 history of smoking





6 which is moderate impairment of LV function addition to segmental wall motion 

abnormalities and some apical ballooning syndrome.  Moderate tricuspid 

regurgitation and mild pulmonary hypertension was also noted.  There is apical 

lateral LV wall motion hypokinesis.  





Plan





Drop-down the PEEP down to 8 and later on down to 6 with an FiO2 of 40%


Sedation holiday


Check weaning parameters


May need another 24 hours on a mechanical ventilator to improve the hemodynamics

prior to being considered for extubation


Wean off pressors and the patient is running at 8 g per minute of 

norepinephrine infusion for now


Continue the TPN for nutritional support


Continued IV Unasyn and Flagyl 


Discontinue the Lasix for now


IV fluids of half-normal saline at rate of 20 mL an hour


Heparin subcu for DVT prophylaxis


We'll continue to follow make further recommendations based on overall progress.

 Condition is critical at this point in time.  This evaluation was done more 

than 30 minutes.














Time with Patient: Greater than 30

## 2020-06-22 NOTE — P.PN
<LeoRoula FISHER - Last Filed: 06/22/20 13:51>





Subjective


Progress Note Date: 06/22/20





CHIEF COMPLAINT: Abdominal pain





HISTORY OF PRESENT ILLNESS: 59-year-old female who underwent exploratory 

laparotomy with sigmoid colectomy, descending colostomy creation, and 

appendectomy with Dr. Reed on June 14, 2020. Patient was reintubated on 

6/19/2020. She remains on mechanical ventilation. Fio2 40%. TPN infusing. WBC 

11.1. Hemoglobin 11.8.





PHYSICAL EXAM: 


VITAL SIGNS: Reviewed


GENERAL: Well-developed in no acute distress


HEENT:  No sclera icterus. Extraocular movements grossly intact.  Moist buccal 

mucosa. 


Head is atraumatic, normocephalic. No nasal drainage.


NECK:  Supple without lymphadenopathy.


CHEST:  Non-labored respirations and equal bilateral excursions


CARDIOVASCULAR:  Tachycardic.  Regular rate with regular rhythm.  Palpable 2+ 

radial pulses.


ABDOMEN:  Soft.  Nondistended. Dressing clean dry intact. Ostomy to left side of

abdomen. Stoma pink. TERESSA drain with serous drainage.


MUSCULOSKELETAL:  No clubbing or cyanosis.


NEUROLOGIC: Sedated on mechanical ventilation 


PSYCH:  Sedated on mechanical ventilation 


SKIN: Well perfused.  Good skin turgor. 





ASSESSMENT: 


1.  Abdominal pain secondary to ruptured sigmoid colon, fecal peritonitis, 

descending and sigmoid colon impaction, appendicolith with appendicitis





PLAN: 


Continue TPN


Monitor TERESSA drain


Continue antibiotics. Monitor labs


Neurology following. Appreciate recommendations and input


Further ICU management per pulmonary





Nurse practitioner note has been reviewed by physician. Signing provider agrees 

with the documented findings, assessment, and plan of care. 








Objective





- Vital Signs


Vital signs: 


                                   Vital Signs











Temp  98 F   06/22/20 08:00


 


Pulse  80   06/22/20 10:00


 


Resp  18   06/22/20 10:00


 


BP  99/71   06/22/20 08:30


 


Pulse Ox  97   06/22/20 10:00








                                 Intake & Output











 06/21/20 06/22/20 06/22/20





 18:59 06:59 18:59


 


Intake Total 2389.369 6805.861 1571.249


 


Output Total 1515 1415 420


 


Balance 61.398 397.870 9497.249


 


Weight 77.6 kg 80 kg 


 


Intake:   


 


  IV 1225 1345 300


 


    Mvi, Adult No.4 with Vit 440  





    K 10 ml Trace (Conc-1Ml/   





    Dose) 1 ml Potassium   





    Chloride 60 meq Potassium   





    Phosphate 20 mmol In   





    Amino Acid 5%-D15w+Lytes*   





    E* 1,000 ml @ 30 mls/hr   





    IV .Q24H [facility] Rx#:   





    [id number]   


 


    Mvi, Adult No.4 with Vit 165 605 220





    K 10 ml Trace (Conc-1Ml/   





    Dose) 1 ml Potassium   





    Chloride 60 meq Potassium   





    Phosphate 30 mmol   





    Magnesium Sulfate gm 0.6   





    gm Calcium Gluconate 1 gm   





    In Amino Acid 5%-D15w 1,   





    000 ml @ 55 mls/hr IV .   





    [id number] [facility] Rx#:[id number]   


 


    Pipercillin/ Tazobactam 3 100 200 





    .375 g   


 


    Sodium Chloride 0.45% 1, 110 190 80





    000 ml @ 20 mls/hr IV .   





    Q24H [facility] Rx#:[id number]   


 


    Sodium Chloride 0.9% 1,00 110 50 





    mL   


 


    Sodium Ferric Gluconat- 100  





    Sucrose 125 mg In Sodium   





    Chloride 0.9% 100 ml @   





    100 mls/hr IVPB DAILY [facility]   





    Rx#:[id number]   


 


    metroNIDAZOLE-NS  200 300 





    mg   


 


  Intake, IV Titration 351.398 125.977 7464.249





  Amount   


 


    Mvi, Adult No.4 with Vit 55  





    K 10 ml Trace (Conc-1Ml/   





    Dose) 1 ml Potassium   





    Chloride 40 meq Potassium   





    Phosphate 15 mmol In   





    Amino Acid 5%-D15w+Lytes*   





    E* 1,000 ml @ 55 mls/hr   





    IV .[id number] [facility] Rx#:   





    [id number]   


 


    Mvi, Adult No.4 with Vit   1062.2





    K 10 ml Trace (Conc-1Ml/   





    Dose) 1 ml Potassium   





    Chloride 60 meq Potassium   





    Phosphate 30 mmol   





    Magnesium Sulfate gm 0.6   





    gm Calcium Gluconate 1 gm   





    In Amino Acid 5%-D15w 1,   





    000 ml @ 55 mls/hr IV .   





    [id number] [facility] Rx#:[id number]   


 


    Norepinephrine 8 mg In 105.042 125.41 112.728





    Sodium Chloride 0.9% 250   





    ml @ 0.05 MCG/KG/MIN 6.   





    405 mls/hr IV .Q24H [facility]   





    Rx#:[id number]   


 


    Propofol 1,000 mg In 191.356 172.451 96.321





    Empty Bag 1 bag @ Titrate   





    IV .Q0M [facility] Rx#:   





    [id number]   


 


Output:   


 


  Urine 1515 1395 420


 


  Stool  20 


 


Other:   


 


  Voiding Method Indwelling Catheter Indwelling Catheter Indwelling Catheter








                       ABP, PAP, CO, CI - Last Documented











Arterial Blood Pressure        96/69

















- Labs


CBC & Chem 7: 


                                 06/22/20 03:55





                                 06/22/20 03:55


Labs: 


                  Abnormal Lab Results - Last 24 Hours (Table)











  06/18/20 06/19/20 06/21/20 Range/Units





  11:39 06:35 11:14 


 


WBC     (3.8-10.6)  k/uL


 


RBC     (3.80-5.40)  m/uL


 


RDW     (11.5-15.5)  %


 


Neutrophils # (Manual)     (1.3-7.7)  k/uL


 


Metamyelocytes # (Man)     (0)  k/uL


 


ABG pH     (7.35-7.45)  


 


ABG pO2   52 L*   ()  mmHg


 


ABG HCO3     (21-25)  mmol/L


 


ABG Total CO2     (19-24)  mmol/L


 


ABG O2 Saturation     (94-97)  %


 


Chloride     ()  mmol/L


 


Carbon Dioxide     (22-30)  mmol/L


 


BUN     (7-17)  mg/dL


 


Creatinine     (0.52-1.04)  mg/dL


 


Glucose     (74-99)  mg/dL


 


POC Glucose (mg/dL)    128 H  (75-99)  mg/dL


 


Calcium     (8.4-10.2)  mg/dL


 


Total Protein     (6.3-8.2)  g/dL


 


Albumin     (3.5-5.0)  g/dL


 


Vitamin A  16 L    ()  ug/dL














  06/21/20 06/21/20 06/21/20 Range/Units





  12:09 19:06 23:51 


 


WBC     (3.8-10.6)  k/uL


 


RBC     (3.80-5.40)  m/uL


 


RDW     (11.5-15.5)  %


 


Neutrophils # (Manual)     (1.3-7.7)  k/uL


 


Metamyelocytes # (Man)     (0)  k/uL


 


ABG pH     (7.35-7.45)  


 


ABG pO2     ()  mmHg


 


ABG HCO3     (21-25)  mmol/L


 


ABG Total CO2     (19-24)  mmol/L


 


ABG O2 Saturation     (94-97)  %


 


Chloride     ()  mmol/L


 


Carbon Dioxide     (22-30)  mmol/L


 


BUN     (7-17)  mg/dL


 


Creatinine     (0.52-1.04)  mg/dL


 


Glucose     (74-99)  mg/dL


 


POC Glucose (mg/dL)  134 H  127 H  157 H  (75-99)  mg/dL


 


Calcium     (8.4-10.2)  mg/dL


 


Total Protein     (6.3-8.2)  g/dL


 


Albumin     (3.5-5.0)  g/dL


 


Vitamin A     ()  ug/dL














  06/22/20 06/22/20 06/22/20 Range/Units





  03:55 03:55 05:04 


 


WBC   11.1 H   (3.8-10.6)  k/uL


 


RBC   3.74 L   (3.80-5.40)  m/uL


 


RDW   16.9 H   (11.5-15.5)  %


 


Neutrophils # (Manual)   8.30 H   (1.3-7.7)  k/uL


 


Metamyelocytes # (Man)   0.33 H   (0)  k/uL


 


ABG pH    7.51 H  (7.35-7.45)  


 


ABG pO2     ()  mmHg


 


ABG HCO3    34 H  (21-25)  mmol/L


 


ABG Total CO2    35 H  (19-24)  mmol/L


 


ABG O2 Saturation    97.7 H  (94-97)  %


 


Chloride  108 H    ()  mmol/L


 


Carbon Dioxide  32 H    (22-30)  mmol/L


 


BUN  29 H    (7-17)  mg/dL


 


Creatinine  0.42 L    (0.52-1.04)  mg/dL


 


Glucose  133 H    (74-99)  mg/dL


 


POC Glucose (mg/dL)     (75-99)  mg/dL


 


Calcium  8.1 L    (8.4-10.2)  mg/dL


 


Total Protein  4.8 L    (6.3-8.2)  g/dL


 


Albumin  2.3 L    (3.5-5.0)  g/dL


 


Vitamin A     ()  ug/dL














  06/22/20 Range/Units





  05:49 


 


WBC   (3.8-10.6)  k/uL


 


RBC   (3.80-5.40)  m/uL


 


RDW   (11.5-15.5)  %


 


Neutrophils # (Manual)   (1.3-7.7)  k/uL


 


Metamyelocytes # (Man)   (0)  k/uL


 


ABG pH   (7.35-7.45)  


 


ABG pO2   ()  mmHg


 


ABG HCO3   (21-25)  mmol/L


 


ABG Total CO2   (19-24)  mmol/L


 


ABG O2 Saturation   (94-97)  %


 


Chloride   ()  mmol/L


 


Carbon Dioxide   (22-30)  mmol/L


 


BUN   (7-17)  mg/dL


 


Creatinine   (0.52-1.04)  mg/dL


 


Glucose   (74-99)  mg/dL


 


POC Glucose (mg/dL)  134 H  (75-99)  mg/dL


 


Calcium   (8.4-10.2)  mg/dL


 


Total Protein   (6.3-8.2)  g/dL


 


Albumin   (3.5-5.0)  g/dL


 


Vitamin A   ()  ug/dL








                      Microbiology - Last 24 Hours (Table)











 06/19/20 05:10 Blood Culture - Preliminary





 Blood    No Growth after 72 hours


 


 06/15/20 16:30 Blood Culture - Final





 Blood    No Growth after 144 hours


 


 06/15/20 16:25 Blood Culture - Final





 Blood    No Growth after 144 hours


 


 06/19/20 18:00 Gram Stain - Preliminary





 Sputum Sputum Culture - Preliminary














<Socorro Reed N - Last Filed: 06/26/20 03:07>





Subjective


Patient seen and evaluated nurse practitioner.  Additional recommendations 

include continue parenteral nutrition.  No plans for feeding tube as patient w

ill likely awaken and tolerate oral feeds.  Will need tailored dietary plan from

bariatric dietary consult.  JPs are serous.  Will need ostomy nurse care.  

Persistent tachycardia being followed by cardiology with elevated troponins 

noted.





Objective





- Vital Signs


Vital signs: 


                                   Vital Signs











Temp  98.1 F   06/26/20 00:00


 


Pulse  79   06/26/20 02:00


 


Resp  22   06/26/20 02:00


 


BP  95/74   06/25/20 20:00


 


Pulse Ox  96   06/26/20 02:00








                                 Intake & Output











 06/25/20 06/25/20 06/26/20





 06:59 18:59 06:59


 


Intake Total 1300 2538.2 876


 


Output Total 1815 2300 1300


 


Balance -515 238.2 -424


 


Weight 79 kg  


 


Intake:   


 


  IV 1300 1226 876


 


    Ampicillin-Sulbactam 3 gm 200 100 100





    In Sodium Chloride 0.9%   





    100 ml @ 200 mls/hr IVPB   





    Q6HR [facility] Rx#:[id number]   


 


    Fat Emulsion 20% 250 mL@  126 126





    20.833mls/hr   


 


    Mvi, Adult No.4 with Vit 660 660 





    K 10 ml Trace (Conc-1Ml/   





    Dose) 1 ml Potassium   





    Chloride 60 meq Potassium   





    Phosphate 30 mmol   





    Magnesium Sulfate gm 0.6   





    gm Calcium Gluconate 1 gm   





    In Amino Acid 5%-D15w 1,   





    000 ml @ 55 mls/hr IV .   





    [id number] [facility] Rx#:[id number]   


 


    Mvi, Adult No.4 with Vit   385





    K 10 ml Trace (Conc-1Ml/   





    Dose) 1 ml Potassium   





    Chloride 60 meq Potassium   





    Phosphate 30 mmol   





    Magnesium Sulfate gm 0.6   





    gm Calcium Gluconate 1 gm   





    In Amino Acid 5%-D15w 1,   





    000 ml @ 55 mls/hr IV .   





    [id number] [facility] Rx#:[id number]   


 


    Sodium Chloride 0.45% 1, 240 240 160





    000 ml @ 20 mls/hr IV .   





    Q24H [facility] Rx#:[id number]   


 


    Sodium Chloride 0.9% 1,00   5





    mL   


 


    metroNIDAZOLE-NS  200 100 100





    mg   


 


  Intake, IV Titration  1062.2 





  Amount   


 


    Mvi, Adult No.4 with Vit  1062.2 





    K 10 ml Trace (Conc-1Ml/   





    Dose) 1 ml Potassium   





    Chloride 60 meq Potassium   





    Phosphate 30 mmol   





    Magnesium Sulfate gm 0.6   





    gm Calcium Gluconate 1 gm   





    In Amino Acid 5%-D15w 1,   





    000 ml @ 55 mls/hr IV .   





    [id number] [facility] Rx#:[id number]   


 


  Oral  250 


 


Output:   


 


  Drainage 40  


 


    Right Lower Abdomen 40  


 


  Urine 1575 1450 1200


 


  Stool 200 850 100


 


Other:   


 


  Voiding Method Indwelling Catheter Indwelling Catheter Indwelling Catheter








                       ABP, PAP, CO, CI - Last Documented











Arterial Blood Pressure        89/47

















- Labs


CBC & Chem 7: 


                                 06/25/20 05:25





                                 06/25/20 05:25


Labs: 


                  Abnormal Lab Results - Last 24 Hours (Table)











  06/25/20 06/25/20 06/25/20 Range/Units





  05:25 05:25 17:33 


 


WBC   13.7 H   (3.8-10.6)  k/uL


 


RBC   3.79 L   (3.80-5.40)  m/uL


 


RDW   17.0 H   (11.5-15.5)  %


 


Neutrophils #   11.6 H   (1.3-7.7)  k/uL


 


Sodium  133 L    (137-145)  mmol/L


 


Creatinine  0.34 L    (0.52-1.04)  mg/dL


 


Glucose  114 H    (74-99)  mg/dL


 


POC Glucose (mg/dL)    101 H  (75-99)  mg/dL


 


Calcium  8.1 L    (8.4-10.2)  mg/dL








                      Microbiology - Last 24 Hours (Table)











 06/24/20 13:50 Blood Culture - Preliminary





 Blood    No Growth after 24 hours


 


 06/19/20 05:10 Blood Culture - Final





 Blood    No Growth after 144 hours














Assessment and Plan


(1) Peritonitis (acute) generalized


Current Visit: Yes   Status: Acute   Code(s): K65.0 - GENERALIZED (ACUTE) 

PERITONITIS   SNOMED Code(s): 75818775


   





(2) History of gastric bypass


Current Visit: Yes   Status: Acute   Code(s): Z98.84 - BARIATRIC SURGERY STATUS 

 SNOMED Code(s): 893013367


   





(3) Medical non-compliance


Current Visit: Yes   Status: Acute   Code(s): Z91.19 - PATIENT'S NONCOMPLIANCE W

OTH MEDICAL TREATMENT AND REGIMEN   SNOMED Code(s): 115094836


   





(4) Tobacco abuse


Current Visit: Yes   Status: Acute   Code(s): Z72.0 - TOBACCO USE   SNOMED 

Code(s): 224390085


   





(5) Tobacco abuse counseling


Current Visit: Yes   Status: Acute   Code(s): Z71.6 - TOBACCO ABUSE COUNSELING  

SNOMED Code(s): 546276931


   





(6) Depressive disorder


Current Visit: Yes   Status: Acute   Code(s): F32.9 - MAJOR DEPRESSIVE DISORDER,

SINGLE EPISODE, UNSPECIFIED   SNOMED Code(s): 09891463


   





(7) Hypothyroidism


Current Visit: Yes   Status: Acute   Code(s): E03.9 - HYPOTHYROIDISM, 

UNSPECIFIED   SNOMED Code(s): 53900602


   





(8) Abdominal pain


Current Visit: Yes   Status: Acute   Code(s): R10.9 - UNSPECIFIED ABDOMINAL PAIN

  SNOMED Code(s): 53414955


   





(9) Free intraperitoneal air


Current Visit: Yes   Status: Acute   Code(s): K66.8 - OTHER SPECIFIED DISORDERS 

OF PERITONEUM   SNOMED Code(s): 71537494


   





(10) Dehydration


Current Visit: Yes   Status: Acute   Code(s): E86.0 - DEHYDRATION   SNOMED 

Code(s): 36447724

## 2020-06-22 NOTE — P.PN
Subjective





Patient is seen in follow-up for acute kidney injury.  GFR is back to baseline. 

Sodium level is better today. Currently intubated on 50% FiO2. Urine output 

about 100 mL an hour. Receiving TPN. Also on Levophed.





Vital signs are stable.


General: The patient appeared well nourished and normally developed. 


HEENT: Head exam is unremarkable. Neck is without jugular venous distension. 

Intubated.


LUNGS: Breath sounds decreased.


HEART: Rate and Rhythm are regular. 


ABDOMEN: Soft.


EXTREMITITES: 1+ edema.





Objective





- Vital Signs


Vital signs: 


                                   Vital Signs











Temp  98 F   06/22/20 08:00


 


Pulse  81   06/22/20 08:00


 


Resp  18   06/22/20 08:00


 


BP  99/71   06/22/20 08:00


 


Pulse Ox  100   06/22/20 08:00








                                 Intake & Output











 06/21/20 06/22/20 06/22/20





 18:59 06:59 18:59


 


Intake Total 6266.271 2172.861 150


 


Output Total 1515 1415 170


 


Balance 61.398 227.861 -20


 


Weight 77.6 kg 80 kg 


 


Intake:   


 


  IV 1225 1345 150


 


    Mvi, Adult No.4 with Vit 440  





    K 10 ml Trace (Conc-1Ml/   





    Dose) 1 ml Potassium   





    Chloride 60 meq Potassium   





    Phosphate 20 mmol In   





    Amino Acid 5%-D15w+Lytes*   





    E* 1,000 ml @ 30 mls/hr   





    IV .Q24H ABIGAIL Rx#:   





    [id number]   


 


    Mvi, Adult No.4 with Vit 165 605 110





    K 10 ml Trace (Conc-1Ml/   





    Dose) 1 ml Potassium   





    Chloride 60 meq Potassium   





    Phosphate 30 mmol   





    Magnesium Sulfate gm 0.6   





    gm Calcium Gluconate 1 gm   





    In Amino Acid 5%-D15w 1,   





    000 ml @ 55 mls/hr IV .   





    [id number] ABIGAIL Rx#:[id number]   


 


    Pipercillin/ Tazobactam 3 100 200 





    .375 g   


 


    Sodium Chloride 0.45% 1, 110 190 40





    000 ml @ 20 mls/hr IV .   





    Q24H ABIGAIL Rx#:[id number]   


 


    Sodium Chloride 0.9% 1,00 110 50 





    mL   


 


    Sodium Ferric Gluconat- 100  





    Sucrose 125 mg In Sodium   





    Chloride 0.9% 100 ml @   





    100 mls/hr IVPB DAILY ABIGAIL   





    Rx#:[id number]   


 


    metroNIDAZOLE-NS  200 300 





    mg   


 


  Intake, IV Titration 351.398 297.861 





  Amount   


 


    Mvi, Adult No.4 with Vit 55  





    K 10 ml Trace (Conc-1Ml/   





    Dose) 1 ml Potassium   





    Chloride 40 meq Potassium   





    Phosphate 15 mmol In   





    Amino Acid 5%-D15w+Lytes*   





    E* 1,000 ml @ 55 mls/hr   





    IV .[id number] ABIGAIL Rx#:   





    [id number]   


 


    Norepinephrine 8 mg In 105.042 125.41 





    Sodium Chloride 0.9% 250   





    ml @ 0.05 MCG/KG/MIN 6.   





    405 mls/hr IV .Q24H ABIGAIL   





    Rx#:[id number]   


 


    Propofol 1,000 mg In 191.356 172.451 





    Empty Bag 1 bag @ Titrate   





    IV .Q0M ABIGAIL Rx#:   





    [id number]   


 


Output:   


 


  Urine 1515 1395 170


 


  Stool  20 


 


Other:   


 


  Voiding Method Indwelling Catheter Indwelling Catheter 








                       ABP, PAP, CO, CI - Last Documented











Arterial Blood Pressure        118/73

















- Labs


CBC & Chem 7: 


                                 06/22/20 03:55





                                 06/22/20 03:55


Labs: 


                  Abnormal Lab Results - Last 24 Hours (Table)











  06/18/20 06/19/20 06/21/20 Range/Units





  11:39 06:35 11:14 


 


WBC     (3.8-10.6)  k/uL


 


RBC     (3.80-5.40)  m/uL


 


RDW     (11.5-15.5)  %


 


Neutrophils # (Manual)     (1.3-7.7)  k/uL


 


Metamyelocytes # (Man)     (0)  k/uL


 


ABG pH     (7.35-7.45)  


 


ABG pO2   52 L*   ()  mmHg


 


ABG HCO3     (21-25)  mmol/L


 


ABG Total CO2     (19-24)  mmol/L


 


ABG O2 Saturation     (94-97)  %


 


Chloride     ()  mmol/L


 


Carbon Dioxide     (22-30)  mmol/L


 


BUN     (7-17)  mg/dL


 


Creatinine     (0.52-1.04)  mg/dL


 


Glucose     (74-99)  mg/dL


 


POC Glucose (mg/dL)    128 H  (75-99)  mg/dL


 


Calcium     (8.4-10.2)  mg/dL


 


Total Protein     (6.3-8.2)  g/dL


 


Albumin     (3.5-5.0)  g/dL


 


Vitamin A  16 L    ()  ug/dL














  06/21/20 06/21/20 06/21/20 Range/Units





  12:09 19:06 23:51 


 


WBC     (3.8-10.6)  k/uL


 


RBC     (3.80-5.40)  m/uL


 


RDW     (11.5-15.5)  %


 


Neutrophils # (Manual)     (1.3-7.7)  k/uL


 


Metamyelocytes # (Man)     (0)  k/uL


 


ABG pH     (7.35-7.45)  


 


ABG pO2     ()  mmHg


 


ABG HCO3     (21-25)  mmol/L


 


ABG Total CO2     (19-24)  mmol/L


 


ABG O2 Saturation     (94-97)  %


 


Chloride     ()  mmol/L


 


Carbon Dioxide     (22-30)  mmol/L


 


BUN     (7-17)  mg/dL


 


Creatinine     (0.52-1.04)  mg/dL


 


Glucose     (74-99)  mg/dL


 


POC Glucose (mg/dL)  134 H  127 H  157 H  (75-99)  mg/dL


 


Calcium     (8.4-10.2)  mg/dL


 


Total Protein     (6.3-8.2)  g/dL


 


Albumin     (3.5-5.0)  g/dL


 


Vitamin A     ()  ug/dL














  06/22/20 06/22/20 06/22/20 Range/Units





  03:55 03:55 05:04 


 


WBC   11.1 H   (3.8-10.6)  k/uL


 


RBC   3.74 L   (3.80-5.40)  m/uL


 


RDW   16.9 H   (11.5-15.5)  %


 


Neutrophils # (Manual)   8.30 H   (1.3-7.7)  k/uL


 


Metamyelocytes # (Man)   0.33 H   (0)  k/uL


 


ABG pH    7.51 H  (7.35-7.45)  


 


ABG pO2     ()  mmHg


 


ABG HCO3    34 H  (21-25)  mmol/L


 


ABG Total CO2    35 H  (19-24)  mmol/L


 


ABG O2 Saturation    97.7 H  (94-97)  %


 


Chloride  108 H    ()  mmol/L


 


Carbon Dioxide  32 H    (22-30)  mmol/L


 


BUN  29 H    (7-17)  mg/dL


 


Creatinine  0.42 L    (0.52-1.04)  mg/dL


 


Glucose  133 H    (74-99)  mg/dL


 


POC Glucose (mg/dL)     (75-99)  mg/dL


 


Calcium  8.1 L    (8.4-10.2)  mg/dL


 


Total Protein  4.8 L    (6.3-8.2)  g/dL


 


Albumin  2.3 L    (3.5-5.0)  g/dL


 


Vitamin A     ()  ug/dL














  06/22/20 Range/Units





  05:49 


 


WBC   (3.8-10.6)  k/uL


 


RBC   (3.80-5.40)  m/uL


 


RDW   (11.5-15.5)  %


 


Neutrophils # (Manual)   (1.3-7.7)  k/uL


 


Metamyelocytes # (Man)   (0)  k/uL


 


ABG pH   (7.35-7.45)  


 


ABG pO2   ()  mmHg


 


ABG HCO3   (21-25)  mmol/L


 


ABG Total CO2   (19-24)  mmol/L


 


ABG O2 Saturation   (94-97)  %


 


Chloride   ()  mmol/L


 


Carbon Dioxide   (22-30)  mmol/L


 


BUN   (7-17)  mg/dL


 


Creatinine   (0.52-1.04)  mg/dL


 


Glucose   (74-99)  mg/dL


 


POC Glucose (mg/dL)  134 H  (75-99)  mg/dL


 


Calcium   (8.4-10.2)  mg/dL


 


Total Protein   (6.3-8.2)  g/dL


 


Albumin   (3.5-5.0)  g/dL


 


Vitamin A   ()  ug/dL








                      Microbiology - Last 24 Hours (Table)











 06/19/20 05:10 Blood Culture - Preliminary





 Blood    No Growth after 72 hours


 


 06/15/20 16:30 Blood Culture - Final





 Blood    No Growth after 144 hours


 


 06/15/20 16:25 Blood Culture - Final





 Blood    No Growth after 144 hours


 


 06/19/20 18:00 Gram Stain - Preliminary





 Sputum Sputum Culture - Preliminary














Assessment and Plan


Plan: 





Assessment:


1.  Acute kidney injury mostly prerenal secondary to hypotension.  Resolved.


2.  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 Cardiology fol

lowing.


3.  Volume overload.  Improved with IV diuresis.


4.  Sigmoid diverticulitis status post exploratory laparotomy with subsequent 

abdominal abscess.  Infectious disease following.


5.  Hypernatremia secondary to lack of oral water intake with increased free 

water losses from sepsis.  Better.





Plan:


Follow-up chest x-ray.


Wean vasopressors and FiO2.


Continue to monitor renal function and urine output.


May need to resume Lasix depending on volume status.

## 2020-06-22 NOTE — P.PN
Progress Note - Text


Progress Note Date: 06/22/20





Patient seen and evaluated.  Ostomy with flatus including stool and pink and 

viable.  





Discussed with intensive his intent of extubation in 24 hours.  





Patient has orogastric tube for which oral feedings and medications may be used.

 





Discussion of elevated white blood cell count secondary to pharmacy 

discontinuing antibiotic per protocol and not resumed for 48 hours.  





Overall patient's condition is guarded due to underlying severe tobacco abuse 

history, respiratory disorder, cardiac disease for which at some point she will 

need a heart catheterization.  Additionally, patient has medical history of 

noncompliance and general lack of medical care per choice.





Will discontinue wound VAC with placement of Optifoam when seen by ostomy nurse

## 2020-06-23 LAB
ALBUMIN SERPL-MCNC: 2.1 G/DL (ref 3.5–5)
ANION GAP SERPL CALC-SCNC: 1 MMOL/L
BUN SERPL-SCNC: 22 MG/DL (ref 7–17)
CALCIUM SPEC-MCNC: 8.2 MG/DL (ref 8.4–10.2)
CHLORIDE SERPL-SCNC: 110 MMOL/L (ref 98–107)
CO2 BLDA-SCNC: 30 MMOL/L (ref 19–24)
CO2 BLDA-SCNC: 31 MMOL/L (ref 19–24)
CO2 SERPL-SCNC: 29 MMOL/L (ref 22–30)
GLUCOSE BLD-MCNC: 116 MG/DL (ref 75–99)
GLUCOSE BLD-MCNC: 121 MG/DL (ref 75–99)
GLUCOSE BLD-MCNC: 124 MG/DL (ref 75–99)
GLUCOSE BLD-MCNC: 128 MG/DL (ref 75–99)
GLUCOSE BLD-MCNC: 147 MG/DL (ref 75–99)
GLUCOSE SERPL-MCNC: 129 MG/DL (ref 74–99)
HCO3 BLDA-SCNC: 28 MMOL/L (ref 21–25)
HCO3 BLDA-SCNC: 30 MMOL/L (ref 21–25)
MAGNESIUM SPEC-SCNC: 2.1 MG/DL (ref 1.6–2.3)
PCO2 BLDA: 36 MMHG (ref 35–45)
PCO2 BLDA: 40 MMHG (ref 35–45)
PH BLDA: 7.48 [PH] (ref 7.35–7.45)
PH BLDA: 7.51 [PH] (ref 7.35–7.45)
PO2 BLDA: 106 MMHG (ref 83–108)
PO2 BLDA: 87 MMHG (ref 83–108)
POTASSIUM SERPL-SCNC: 4 MMOL/L (ref 3.5–5.1)
SODIUM SERPL-SCNC: 140 MMOL/L (ref 137–145)

## 2020-06-23 RX ADMIN — DIGOXIN SCH MCG: 0.25 INJECTION INTRAMUSCULAR; INTRAVENOUS at 09:03

## 2020-06-23 RX ADMIN — LEUCINE, PHENYLALANINE, LYSINE, METHIONINE, ISOLEUCINE, VALINE, HISTIDINE, THREONINE, TRYPTOPHAN, ALANINE, GLYCINE, ARGININE, PROLINE, SERINE, TYROSINE, DEXTROSE SCH MLS/HR: 365; 280; 290; 200; 300; 290; 240; 210; 90; 1035; 515; 575; 340; 250; 20; 15 INJECTION INTRAVENOUS at 05:04

## 2020-06-23 RX ADMIN — SODIUM CHLORIDE SCH MG: 900 INJECTION, SOLUTION INTRAVENOUS at 09:04

## 2020-06-23 RX ADMIN — INSULIN ASPART SCH: 100 INJECTION, SOLUTION INTRAVENOUS; SUBCUTANEOUS at 18:30

## 2020-06-23 RX ADMIN — CHLORHEXIDINE GLUCONATE SCH ML: 1.2 RINSE ORAL at 09:04

## 2020-06-23 RX ADMIN — SPIRONOLACTONE SCH MG: 25 TABLET, FILM COATED ORAL at 09:03

## 2020-06-23 RX ADMIN — LEUCINE, PHENYLALANINE, LYSINE, METHIONINE, ISOLEUCINE, VALINE, HISTIDINE, THREONINE, TRYPTOPHAN, ALANINE, GLYCINE, ARGININE, PROLINE, SERINE, TYROSINE, DEXTROSE SCH: 365; 280; 290; 200; 300; 290; 240; 210; 90; 1035; 515; 575; 340; 250; 20; 15 INJECTION INTRAVENOUS at 21:18

## 2020-06-23 RX ADMIN — INSULIN ASPART SCH: 100 INJECTION, SOLUTION INTRAVENOUS; SUBCUTANEOUS at 02:05

## 2020-06-23 RX ADMIN — HEPARIN SODIUM SCH UNIT: 5000 INJECTION, SOLUTION INTRAVENOUS; SUBCUTANEOUS at 20:35

## 2020-06-23 RX ADMIN — PROPOFOL SCH MLS/HR: 10 INJECTION, EMULSION INTRAVENOUS at 07:00

## 2020-06-23 RX ADMIN — HEPARIN SODIUM SCH UNIT: 5000 INJECTION, SOLUTION INTRAVENOUS; SUBCUTANEOUS at 09:04

## 2020-06-23 RX ADMIN — AMPICILLIN SODIUM AND SULBACTAM SODIUM SCH MLS/HR: 2; 1 INJECTION, POWDER, FOR SOLUTION INTRAMUSCULAR; INTRAVENOUS at 11:50

## 2020-06-23 RX ADMIN — INSULIN ASPART SCH UNIT: 100 INJECTION, SOLUTION INTRAVENOUS; SUBCUTANEOUS at 23:30

## 2020-06-23 RX ADMIN — SODIUM BICARBONATE SCH: 84 INJECTION, SOLUTION INTRAVENOUS at 18:30

## 2020-06-23 RX ADMIN — INSULIN ASPART SCH: 100 INJECTION, SOLUTION INTRAVENOUS; SUBCUTANEOUS at 13:54

## 2020-06-23 RX ADMIN — ACETAMINOPHEN PRN MLS/HR: 10 INJECTION, SOLUTION INTRAVENOUS at 13:32

## 2020-06-23 RX ADMIN — AMPICILLIN SODIUM AND SULBACTAM SODIUM SCH MLS/HR: 2; 1 INJECTION, POWDER, FOR SOLUTION INTRAMUSCULAR; INTRAVENOUS at 23:20

## 2020-06-23 RX ADMIN — AMPICILLIN SODIUM AND SULBACTAM SODIUM SCH MLS/HR: 2; 1 INJECTION, POWDER, FOR SOLUTION INTRAMUSCULAR; INTRAVENOUS at 18:03

## 2020-06-23 RX ADMIN — INSULIN ASPART SCH: 100 INJECTION, SOLUTION INTRAVENOUS; SUBCUTANEOUS at 07:04

## 2020-06-23 RX ADMIN — LEVOTHYROXINE SODIUM ANHYDROUS SCH MCG: 100 INJECTION, POWDER, LYOPHILIZED, FOR SOLUTION INTRAVENOUS at 09:04

## 2020-06-23 RX ADMIN — PANTOPRAZOLE SODIUM SCH MG: 40 INJECTION, POWDER, FOR SOLUTION INTRAVENOUS at 09:03

## 2020-06-23 RX ADMIN — METOPROLOL SUCCINATE SCH: 25 TABLET, EXTENDED RELEASE ORAL at 18:04

## 2020-06-23 RX ADMIN — METRONIDAZOLE SCH MLS/HR: 500 INJECTION, SOLUTION INTRAVENOUS at 23:21

## 2020-06-23 RX ADMIN — PROPOFOL SCH MLS/HR: 10 INJECTION, EMULSION INTRAVENOUS at 04:00

## 2020-06-23 RX ADMIN — SODIUM CHLORIDE SCH MG: 900 INJECTION, SOLUTION INTRAVENOUS at 20:35

## 2020-06-23 RX ADMIN — HYDROMORPHONE HYDROCHLORIDE PRN MG: 1 INJECTION, SOLUTION INTRAMUSCULAR; INTRAVENOUS; SUBCUTANEOUS at 16:34

## 2020-06-23 RX ADMIN — METRONIDAZOLE SCH MLS/HR: 500 INJECTION, SOLUTION INTRAVENOUS at 05:16

## 2020-06-23 RX ADMIN — METRONIDAZOLE SCH MLS/HR: 500 INJECTION, SOLUTION INTRAVENOUS at 18:04

## 2020-06-23 RX ADMIN — LISINOPRIL SCH MG: 5 TABLET ORAL at 09:03

## 2020-06-23 RX ADMIN — AMPICILLIN SODIUM AND SULBACTAM SODIUM SCH MLS/HR: 2; 1 INJECTION, POWDER, FOR SOLUTION INTRAMUSCULAR; INTRAVENOUS at 05:16

## 2020-06-23 RX ADMIN — METRONIDAZOLE SCH MLS/HR: 500 INJECTION, SOLUTION INTRAVENOUS at 11:50

## 2020-06-23 NOTE — P.PN
Subjective





Patient is seen in follow-up for acute kidney injury.  GFR is back to baseline. 

Sodium level is normal. Currently intubated on 40% FiO2.  Nonoliguric.  

Receiving TPN. Also on Levophed.





Vital signs are stable.  On Levophed.


General: The patient appeared well nourished and normally developed. 


HEENT: Head exam is unremarkable. Neck is without jugular venous distension. 

Intubated.


LUNGS: Breath sounds decreased.


HEART: Rate and Rhythm are regular. 


ABDOMEN: Soft.


EXTREMITITES: Trace edema.





Objective





- Vital Signs


Vital signs: 


                                   Vital Signs











Temp  99.6 F   06/23/20 04:00


 


Pulse  74   06/23/20 07:00


 


Resp  17   06/23/20 07:00


 


BP  103/64   06/23/20 07:00


 


Pulse Ox  100   06/23/20 07:00








                                 Intake & Output











 06/22/20 06/23/20 06/23/20





 18:59 06:59 18:59


 


Intake Total 3540.137 2505.114 134.58


 


Output Total 1530 830 150


 


Balance 2010.137 1675.114 -15.42


 


Weight  79.9 kg 


 


Intake:   


 


  IV 1975 1025 75


 


    Mvi, Adult No.4 with Vit 715 605 55





    K 10 ml Trace (Conc-1Ml/   





    Dose) 1 ml Potassium   





    Chloride 60 meq Potassium   





    Phosphate 30 mmol   





    Magnesium Sulfate gm 0.6   





    gm Calcium Gluconate 1 gm   





    In Amino Acid 5%-D15w 1,   





    000 ml @ 55 mls/hr IV .   





    [id number] ABIGAIL Rx#:[id number]   


 


    Sodium Chloride 0.45% 1, 260 220 20





    000 ml @ 20 mls/hr IV .   





    Q24H ABIGAIL Rx#:[id number]   


 


    metroNIDAZOLE-NS  1000 200 





    mg   


 


  Intake, IV Titration 8619.507 7779.114 59.58





  Amount   


 


    Ampicillin-Sulbactam 3 gm  200 





    In Sodium Chloride 0.9%   





    100 ml @ 200 mls/hr IVPB   





    Q6HR ABIGAIL Rx#:[id number]   


 


    Mvi, Adult No.4 with Vit 1062.2 1062.2 





    K 10 ml Trace (Conc-1Ml/   





    Dose) 1 ml Potassium   





    Chloride 60 meq Potassium   





    Phosphate 30 mmol   





    Magnesium Sulfate gm 0.6   





    gm Calcium Gluconate 1 gm   





    In Amino Acid 5%-D15w 1,   





    000 ml @ 55 mls/hr IV .   





    [id number] ABIGAIL Rx#:[id number]   


 


    Norepinephrine 8 mg In 222.211 30.530 





    Sodium Chloride 0.9% 250   





    ml @ 0.05 MCG/KG/MIN 6.   





    405 mls/hr IV .Q24H ABIGAIL   





    Rx#:[id number]   


 


    Propofol 1,000 mg In 280.726 187.384 59.58





    Empty Bag 1 bag @ Titrate   





    IV .Q0M ABIGAIL Rx#:   





    [id number]   


 


Output:   


 


  Drainage 90  


 


    Right Lower Abdomen 90  


 


  Urine 1440 830 150


 


Other:   


 


  Voiding Method Indwelling Catheter Indwelling Catheter 








                       ABP, PAP, CO, CI - Last Documented











Arterial Blood Pressure        129/77

















- Labs


CBC & Chem 7: 


                                 06/22/20 03:55





                                 06/23/20 04:20


Labs: 


                  Abnormal Lab Results - Last 24 Hours (Table)











  06/22/20 06/22/20 06/23/20 Range/Units





  11:48 17:50 00:53 


 


ABG pH     (7.35-7.45)  


 


ABG HCO3     (21-25)  mmol/L


 


ABG Total CO2     (19-24)  mmol/L


 


ABG O2 Saturation     (94-97)  %


 


Chloride     ()  mmol/L


 


BUN     (7-17)  mg/dL


 


Creatinine     (0.52-1.04)  mg/dL


 


Glucose     (74-99)  mg/dL


 


POC Glucose (mg/dL)  131 H  112 H  128 H  (75-99)  mg/dL


 


Calcium     (8.4-10.2)  mg/dL


 


Albumin     (3.5-5.0)  g/dL














  06/23/20 06/23/20 06/23/20 Range/Units





  04:20 05:17 07:02 


 


ABG pH   7.48 H   (7.35-7.45)  


 


ABG HCO3   30 H   (21-25)  mmol/L


 


ABG Total CO2   31 H   (19-24)  mmol/L


 


ABG O2 Saturation   97.5 H   (94-97)  %


 


Chloride  110 H    ()  mmol/L


 


BUN  22 H    (7-17)  mg/dL


 


Creatinine  0.37 L    (0.52-1.04)  mg/dL


 


Glucose  129 H    (74-99)  mg/dL


 


POC Glucose (mg/dL)    124 H  (75-99)  mg/dL


 


Calcium  8.2 L    (8.4-10.2)  mg/dL


 


Albumin  2.1 L    (3.5-5.0)  g/dL








                      Microbiology - Last 24 Hours (Table)











 06/19/20 05:10 Blood Culture - Preliminary





 Blood    No Growth after 96 hours


 


 06/19/20 18:00 Gram Stain - Final





 Sputum Sputum Culture - Final














Assessment and Plan


Plan: 





Assessment:


1.  Acute kidney injury mostly prerenal secondary to hypotension.  Resolved.


2.  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 Cardiology 

following.


3.  Volume overload.  Improved with IV diuresis.


4.  Sigmoid diverticulitis status post exploratory laparotomy with subsequent 

abdominal abscess.  Infectious disease following.


5.  Hypernatremia secondary to lack of oral water intake with increased free 

water losses from sepsis.  Resolved.





Plan:


Wean vasopressors and FiO2.


May need to resume Lasix depending on volume status.


I will sign off.  Please call with any questions or concerns.

## 2020-06-23 NOTE — XR
EXAMINATION TYPE: XR chest 1V portable

 

DATE OF EXAM: 6/23/2020

 

COMPARISON: 6/22/2020

 

HISTORY: SOB, Follow Up

 

FINDINGS:

 

Indwelling tubes and catheters are unchanged.

 

No change in left basilar opacity. Small bilateral pleural effusions suspected. 

 

Stable appearance of the cardio-mediastinal structures at this time.

 

Pleural effusion unchanged.

 

IMPRESSION:

1.  Stable portable chest.  Clinical correlation and follow up until resolution is recommended.

## 2020-06-23 NOTE — P.PN
Subjective


Progress Note Date: 06/23/20





On today's evaluation of 06/22/2020, I'm seeing this patient for a follow-up in 

the intensive care unit.  The patient remains intubated on a mechanical 

ventilator.  The patient is currently sedated and the patient is receiving 

propofol at a dose of 50 mcg/kg per minute.  The patient is on IV fluids at 0.45

saline at the rate of 20 mL an hour.  The patient is on TPN for nutritional 

support running at 55 mL an hour and the patient is on norepinephrine infusion 

at 8.8 micrograms per minutes.  In terms of vent support, the patient remains on

a mechanical ventilator.  She is an assist-control mode with a rate of 20 with a

tidal volume of 350 and FiO2 of 50% with a PEEP of 10.  Earlier blood gases 

showed a pH of 7.5 with a pCO2 of 43 and pO2 of 100.  Necessity ventilator 

changes were done.  Noted the patient is post perforated sigmoid colon and the 

patient is postop day #8.  She had to be reintubated on 06/19/2020 acute hypoxic

respiratory failure and development of bilateral pulmonary infiltrates.  The 

intra-abdominal cultures are showing anaerobic gram-negative bacillus 2 and 

Clostridium perfringens.  Antibiotic coverage remains IV Unasyn and Flagyl for 

now.  The patient on TPN for nutritional support.  The chest x-ray from today 

shows mild stable left lower lobe pulmonary infiltrate.  There is also small 

right sided pleural effusion.  There is blunting of the right costophrenic 

angle.  Small right-sided pleural effusion developed.  ET tube is in a good 

location for now.





On 06/22/2000 and seeing this patient for a follow-up.  The patient is calm and 

comfortable sedated on a mechanical ventilator.  This morning she is on propofol

at the rate of 50 g per KG per minute.  The patient is also receiving TPN at 

the rate of 55 mL an hour.  She is arousable and the patient is gradually being 

weaned off the sedation as the patient is being given a sedation holiday this 

morning.  She remains on assist control mode at the rate of 20 with a tidal 

volume of 350 and FiO2 of 40% with a PEEP of 5.  Blood gases from today showed a

pH of 7.48 with a pCO2 of 40 and pO2 of 106.  Chest x-ray shows significant 

changes compared to yesterday.  There are small better pleural effusion and 

suspected.  No change in the left basilar opacity.  The patient has a functional

colostomy.  There is some stool extubating in the colostomy bag.  Surgical wound

site is dry clean and intact.  The patient is still on pressors and she is 

requiring norepinephrine infusion rate of 5 mg/m.  She is on TPN at the rate of 

55 mL an hour.  She has adequate urine output.  No other significant events 

overnight.  During the course of my evaluation, I stop the sedation, check 

weaning parameters and following that I give the patient is point is breathing 

trial with a pressure support of 5 and a PEEP of 5.  After being on the setting 

for around 30 minutes, the patient up with a pH of 7.51 with a pCO2 of 36 and p

O2 of 87 and the patient is currently being extubated.





Objective





- Vital Signs


Vital signs: 


                                   Vital Signs











Temp  99.6 F   06/23/20 04:00


 


Pulse  77   06/23/20 08:00


 


Resp  20   06/23/20 08:00


 


BP  103/64   06/23/20 07:00


 


Pulse Ox  100   06/23/20 08:00








                                 Intake & Output











 06/22/20 06/23/20 06/23/20





 18:59 06:59 18:59


 


Intake Total 3540.137 2505.114 156.095


 


Output Total 1530 830 150


 


Balance 2010.137 1675.114 6.095


 


Weight  79.9 kg 


 


Intake:   


 


  IV 1975 1025 75


 


    Mvi, Adult No.4 with Vit 715 605 55





    K 10 ml Trace (Conc-1Ml/   





    Dose) 1 ml Potassium   





    Chloride 60 meq Potassium   





    Phosphate 30 mmol   





    Magnesium Sulfate gm 0.6   





    gm Calcium Gluconate 1 gm   





    In Amino Acid 5%-D15w 1,   





    000 ml @ 55 mls/hr IV .   





    [id number] ABIGAIL Rx#:[id number]   


 


    Sodium Chloride 0.45% 1, 260 220 20





    000 ml @ 20 mls/hr IV .   





    Q24H ABIGAIL Rx#:[id number]   


 


    metroNIDAZOLE-NS  1000 200 





    mg   


 


  Intake, IV Titration 9529.478 4425.114 81.095





  Amount   


 


    Ampicillin-Sulbactam 3 gm  200 





    In Sodium Chloride 0.9%   





    100 ml @ 200 mls/hr IVPB   





    Q6HR ABIGAIL Rx#:[id number]   


 


    Mvi, Adult No.4 with Vit 1062.2 1062.2 





    K 10 ml Trace (Conc-1Ml/   





    Dose) 1 ml Potassium   





    Chloride 60 meq Potassium   





    Phosphate 30 mmol   





    Magnesium Sulfate gm 0.6   





    gm Calcium Gluconate 1 gm   





    In Amino Acid 5%-D15w 1,   





    000 ml @ 55 mls/hr IV .   





    [id number] ABIGAIL Rx#:[id number]   


 


    Norepinephrine 8 mg In 222.211 30.530 





    Sodium Chloride 0.9% 250   





    ml @ 0.05 MCG/KG/MIN 6.   





    405 mls/hr IV .Q24H ABIGAIL   





    Rx#:[id number]   


 


    Propofol 1,000 mg In 280.726 187.384 81.095





    Empty Bag 1 bag @ Titrate   





    IV .Q0M ABIGAIL Rx#:   





    [id number]   


 


Output:   


 


  Drainage 90  


 


    Right Lower Abdomen 90  


 


  Urine 1440 830 150


 


Other:   


 


  Voiding Method Indwelling Catheter Indwelling Catheter 








                       ABP, PAP, CO, CI - Last Documented











Arterial Blood Pressure        111/60

















- Exam








GENERAL: Well-developed in no acute distress, intubated on mechanical ventilated

and the patient was sedated for now.  Orogastric and orotracheal tube are both 

in place.


HEENT:  No sclera icterus. Extraocular movements grossly intact.  Moist buccal 

mucosa. 


Head is atraumatic, normocephalic. No nasal drainage.


NECK:  Supple without lymphadenopathy.


CHEST:  Non-labored respirations and equal bilateral excursions


CARDIOVASCULAR:  Tachycardic.  Regular rate with regular rhythm.  Palpable 2+ 

radial pulses.


ABDOMEN:  Soft.  Nondistended. PREVENA wound vac noted. Ostomy to left side of 

abdomen. No stool or gas noted. Stoma pink. TERESSA drain with serous drainage.


MUSCULOSKELETAL:  No clubbing or cyanosis.


NEUROLOGIC: The patient is receiving a sedation holiday and the patient is 

undergoing a spontaneous breathing trial at this point in time.


PSYCH:  Unable to assess secondary to altered mental status


SKIN: Well perfused.  Good skin turgor. 


 





- Labs


CBC & Chem 7: 


                                 06/22/20 03:55





                                 06/23/20 04:20


Labs: 


                  Abnormal Lab Results - Last 24 Hours (Table)











  06/22/20 06/22/20 06/23/20 Range/Units





  11:48 17:50 00:53 


 


ABG pH     (7.35-7.45)  


 


ABG HCO3     (21-25)  mmol/L


 


ABG Total CO2     (19-24)  mmol/L


 


ABG O2 Saturation     (94-97)  %


 


Chloride     ()  mmol/L


 


BUN     (7-17)  mg/dL


 


Creatinine     (0.52-1.04)  mg/dL


 


Glucose     (74-99)  mg/dL


 


POC Glucose (mg/dL)  131 H  112 H  128 H  (75-99)  mg/dL


 


Calcium     (8.4-10.2)  mg/dL


 


Albumin     (3.5-5.0)  g/dL














  06/23/20 06/23/20 06/23/20 Range/Units





  04:20 05:17 07:02 


 


ABG pH   7.48 H   (7.35-7.45)  


 


ABG HCO3   30 H   (21-25)  mmol/L


 


ABG Total CO2   31 H   (19-24)  mmol/L


 


ABG O2 Saturation   97.5 H   (94-97)  %


 


Chloride  110 H    ()  mmol/L


 


BUN  22 H    (7-17)  mg/dL


 


Creatinine  0.37 L    (0.52-1.04)  mg/dL


 


Glucose  129 H    (74-99)  mg/dL


 


POC Glucose (mg/dL)    124 H  (75-99)  mg/dL


 


Calcium  8.2 L    (8.4-10.2)  mg/dL


 


Albumin  2.1 L    (3.5-5.0)  g/dL














  06/23/20 Range/Units





  09:04 


 


ABG pH  7.51 H  (7.35-7.45)  


 


ABG HCO3  28 H  (21-25)  mmol/L


 


ABG Total CO2  30 H  (19-24)  mmol/L


 


ABG O2 Saturation   (94-97)  %


 


Chloride   ()  mmol/L


 


BUN   (7-17)  mg/dL


 


Creatinine   (0.52-1.04)  mg/dL


 


Glucose   (74-99)  mg/dL


 


POC Glucose (mg/dL)   (75-99)  mg/dL


 


Calcium   (8.4-10.2)  mg/dL


 


Albumin   (3.5-5.0)  g/dL








                      Microbiology - Last 24 Hours (Table)











 06/19/20 05:10 Blood Culture - Preliminary





 Blood    No Growth after 96 hours


 


 06/19/20 18:00 Gram Stain - Final





 Sputum Sputum Culture - Final














Assessment and Plan


Plan: 








1 ruptured sigmoid colon with fecal peritonitis, along with descending and 

sigmoid colon impaction and appendicolith and appendicitis.  The patient 

underwent expose a laparotomy with sigmoid colectomy and descending colostomy 

creation and appendectomy.  Surgery was done on 06/14/2020.  The patient is 

recovering well from surgery and the patient has some bowel activity and sleep 

emanating in the colostomy bag





2 acute hypoxic respiratory failure and the patient had to be reintubated on 

06/19/2024 hypoxic respiratory failure and development of breath and pulmonary 

infiltrates.  Consider an early acute lung injury/ARDS.  Pulmonary infiltrate is

improving and the patient's saturations improved with improvement in airway 

pressures.  Chest x-ray and the vent settings were all noted.  On today's 

evaluation, the patient was able to give me good weaning parameters while the 

patient was off sedation.  As such, I'm considering extubating this patient and 

I gave her a spontaneous breathing trial and a blood gases were adequate.





3 sepsis with secondary hypotension secondary to above.  The patient is still 

requiring pressors in addition to broad-spectrum antibiotics.  The intra-

abdominal culture are all anaerobes including anaerobic gram-negative bacillus 

and Clostridium perfringens.  The patient continues to require pressors which is

running at 5 g per minute





4 Hypothyroidism





5 history of smoking





6 which is moderate impairment of LV function addition to segmental wall motion 

abnormalities and some apical ballooning syndrome.  Moderate tricuspid 

regurgitation and mild pulmonary hypertension was also noted.  There is apical 

lateral LV wall motion hypokinesis.  





Plan





I checked her weaning parameters.  I checked this point is breathing trial.  I 

checked a blood gas.  I checked a chest x-ray.  The patient can be extubated 

today.


Stop all sedations 


Wean off pressors and the patient is currently down to 5 g of norepinephrine 

infusion per minute 


Continue the TPN for nutritional support


Continued IV Unasyn and Flagyl 


Discontinue the Lasix for now


IV fluids of half-normal saline at rate of 20 mL an hour


Heparin subcu for DVT prophylaxis


We'll continue to follow make further recommendations based on overall progress.

 Condition is critical at this point in time.  This evaluation was done more 

than 30 minutes.














Time with Patient: Greater than 30

## 2020-06-23 NOTE — PN
PROGRESS NOTE



Anna is a 59-year-old lady who is admitted to hospital with perforated viscus and

had  takotsubo syndrome.  This morning, she is in the process of being weaned and

extubated.  Blood pressure has improved and starting her on lisinopril 5 mg daily.



On exam, heart rate is 77 beats per minute.  Blood pressure is 111/60.  Respiratory is

18.  There is no jugular venous distention.  Chest exam reveals good air entry

bilaterally.  Heart exam reveals first and second heart sounds.  No gallop.  Abdomen is

soft.  Exam of extremities did not reveal any edema.  Peripheral pulses are felt.



Labs show a potassium of 4, creatinine of 0.37.  Chest x-ray appears stable.



ASSESSMENT:

1. Takotsubo syndrome.

2. Perforated viscus status post surgery.



PLAN:

Will start her on Zestril today.  Continue the Aldactone that she is on.  Continue the

digoxin.  We will start on metoprolol.





MMODL / IJN: [id number] / Job#: [id number]

## 2020-06-23 NOTE — P.PN
<LeoUyenRoula A - Last Filed: 06/23/20 10:30>





Subjective


Progress Note Date: 06/23/20





CHIEF COMPLAINT: Abdominal pain





HISTORY OF PRESENT ILLNESS: 59-year-old female who underwent exploratory 

laparotomy with sigmoid colectomy, descending colostomy creation, and 

appendectomy with Dr. Reed on June 14, 2020. Patient remains on mechanical 

ventilation. Sedation has been stopped. Patient is awake and tracking provider 

in the room. Vital signs stable. She is afebrile.





PHYSICAL EXAM: 


VITAL SIGNS: Reviewed


GENERAL: Well-developed in no acute distress


HEENT:  No sclera icterus. Extraocular movements grossly intact.  Moist buccal 

mucosa. 


Head is atraumatic, normocephalic. No nasal drainage.


NECK:  Supple without lymphadenopathy.


CHEST:  Non-labored respirations and equal bilateral excursions


CARDIOVASCULAR:  Regular rate with regular rhythm.  Palpable 2+ radial pulses.


ABDOMEN:  Soft.  Nondistended. Dressing clean dry intact. Ostomy to left side of

abdomen. Stoma pink. TERESSA drain with serous drainage.


MUSCULOSKELETAL:  No clubbing or cyanosis.


NEUROLOGIC: Awake on mechanical ventilation 


PSYCH:  Awake on mechanical ventilation 


SKIN: Well perfused.  Good skin turgor. 





ASSESSMENT: 


1.  Abdominal pain secondary to ruptured sigmoid colon, fecal peritonitis, 

descending and sigmoid colon impaction, appendicolith with appendicitis





PLAN: 


Continue TPN


Monitor TERESSA drain


Continue antibiotics. Monitor labs


PREVENA wound vac discontinued at bedside. Ostomy resource RN, Tamiko White, at

bedside to change ostomy appliance.


Neurology following. Appreciate recommendations and input


Further ICU management per pulmonary





Nurse practitioner note has been reviewed by physician. Signing provider agrees 

with the documented findings, assessment, and plan of care. 








Objective





- Vital Signs


Vital signs: 


                                   Vital Signs











Temp  99.6 F   06/23/20 04:00


 


Pulse  77   06/23/20 08:00


 


Resp  20   06/23/20 08:00


 


BP  103/64   06/23/20 07:00


 


Pulse Ox  100   06/23/20 08:00








                                 Intake & Output











 06/22/20 06/23/20 06/23/20





 18:59 06:59 18:59


 


Intake Total 3540.137 2505.114 156.095


 


Output Total 1530 830 150


 


Balance 2010.137 1675.114 6.095


 


Weight  79.9 kg 


 


Intake:   


 


  IV 1975 1025 75


 


    Mvi, Adult No.4 with Vit 715 605 55





    K 10 ml Trace (Conc-1Ml/   





    Dose) 1 ml Potassium   





    Chloride 60 meq Potassium   





    Phosphate 30 mmol   





    Magnesium Sulfate gm 0.6   





    gm Calcium Gluconate 1 gm   





    In Amino Acid 5%-D15w 1,   





    000 ml @ 55 mls/hr IV .   





    [id number] [facility] Rx#:[id number]   


 


    Sodium Chloride 0.45% 1, 260 220 20





    000 ml @ 20 mls/hr IV .   





    Q24H [facility] Rx#:[id number]   


 


    metroNIDAZOLE-NS  1000 200 





    mg   


 


  Intake, IV Titration 2167.375 6220.114 81.095





  Amount   


 


    Ampicillin-Sulbactam 3 gm  200 





    In Sodium Chloride 0.9%   





    100 ml @ 200 mls/hr IVPB   





    Q6HR [facility] Rx#:[id number]   


 


    Mvi, Adult No.4 with Vit 1062.2 1062.2 





    K 10 ml Trace (Conc-1Ml/   





    Dose) 1 ml Potassium   





    Chloride 60 meq Potassium   





    Phosphate 30 mmol   





    Magnesium Sulfate gm 0.6   





    gm Calcium Gluconate 1 gm   





    In Amino Acid 5%-D15w 1,   





    000 ml @ 55 mls/hr IV .   





    [id number] [facility] Rx#:[id number]   


 


    Norepinephrine 8 mg In 222.211 30.530 





    Sodium Chloride 0.9% 250   





    ml @ 0.05 MCG/KG/MIN 6.   





    405 mls/hr IV .Q24H [facility]   





    Rx#:[id number]   


 


    Propofol 1,000 mg In 280.726 187.384 81.095





    Empty Bag 1 bag @ Titrate   





    IV .Q0M [facility] Rx#:   





    [id number]   


 


Output:   


 


  Drainage 90  


 


    Right Lower Abdomen 90  


 


  Urine 1440 830 150


 


Other:   


 


  Voiding Method Indwelling Catheter Indwelling Catheter 








                       ABP, PAP, CO, CI - Last Documented











Arterial Blood Pressure        111/60

















- Labs


CBC & Chem 7: 


                                 06/22/20 03:55





                                 06/23/20 04:20


Labs: 


                  Abnormal Lab Results - Last 24 Hours (Table)











  06/22/20 06/22/20 06/23/20 Range/Units





  11:48 17:50 00:53 


 


ABG pH     (7.35-7.45)  


 


ABG HCO3     (21-25)  mmol/L


 


ABG Total CO2     (19-24)  mmol/L


 


ABG O2 Saturation     (94-97)  %


 


Chloride     ()  mmol/L


 


BUN     (7-17)  mg/dL


 


Creatinine     (0.52-1.04)  mg/dL


 


Glucose     (74-99)  mg/dL


 


POC Glucose (mg/dL)  131 H  112 H  128 H  (75-99)  mg/dL


 


Calcium     (8.4-10.2)  mg/dL


 


Albumin     (3.5-5.0)  g/dL














  06/23/20 06/23/20 06/23/20 Range/Units





  04:20 05:17 07:02 


 


ABG pH   7.48 H   (7.35-7.45)  


 


ABG HCO3   30 H   (21-25)  mmol/L


 


ABG Total CO2   31 H   (19-24)  mmol/L


 


ABG O2 Saturation   97.5 H   (94-97)  %


 


Chloride  110 H    ()  mmol/L


 


BUN  22 H    (7-17)  mg/dL


 


Creatinine  0.37 L    (0.52-1.04)  mg/dL


 


Glucose  129 H    (74-99)  mg/dL


 


POC Glucose (mg/dL)    124 H  (75-99)  mg/dL


 


Calcium  8.2 L    (8.4-10.2)  mg/dL


 


Albumin  2.1 L    (3.5-5.0)  g/dL














  06/23/20 Range/Units





  09:04 


 


ABG pH  7.51 H  (7.35-7.45)  


 


ABG HCO3  28 H  (21-25)  mmol/L


 


ABG Total CO2  30 H  (19-24)  mmol/L


 


ABG O2 Saturation   (94-97)  %


 


Chloride   ()  mmol/L


 


BUN   (7-17)  mg/dL


 


Creatinine   (0.52-1.04)  mg/dL


 


Glucose   (74-99)  mg/dL


 


POC Glucose (mg/dL)   (75-99)  mg/dL


 


Calcium   (8.4-10.2)  mg/dL


 


Albumin   (3.5-5.0)  g/dL








                      Microbiology - Last 24 Hours (Table)











 06/19/20 05:10 Blood Culture - Preliminary





 Blood    No Growth after 96 hours


 


 06/19/20 18:00 Gram Stain - Final





 Sputum Sputum Culture - Final














<Socorro Reed - Last Filed: 06/26/20 03:09>





Subjective





Patient seen and evaluated with nurse practitioner.  Additional recommendations 

include resolution of oliguria as she is making moderate urine with Lasix drip. 

Additionally, patients on a beta blocker.  Neurological status is slowly 

improving.  In the interim, continue with TPN.





Objective





- Vital Signs


Vital signs: 


                                   Vital Signs











Temp  98.1 F   06/26/20 00:00


 


Pulse  79   06/26/20 02:00


 


Resp  22   06/26/20 02:00


 


BP  95/74   06/25/20 20:00


 


Pulse Ox  96   06/26/20 02:00








                                 Intake & Output











 06/25/20 06/25/20 06/26/20





 06:59 18:59 06:59


 


Intake Total 1300 2538.2 876


 


Output Total 1815 2300 1300


 


Balance -515 238.2 -424


 


Weight 79 kg  


 


Intake:   


 


  IV 1300 1226 876


 


    Ampicillin-Sulbactam 3 gm 200 100 100





    In Sodium Chloride 0.9%   





    100 ml @ 200 mls/hr IVPB   





    Q6HR [facility] Rx#:[id number]   


 


    Fat Emulsion 20% 250 mL@  126 126





    20.833mls/hr   


 


    Mvi, Adult No.4 with Vit 660 660 





    K 10 ml Trace (Conc-1Ml/   





    Dose) 1 ml Potassium   





    Chloride 60 meq Potassium   





    Phosphate 30 mmol   





    Magnesium Sulfate gm 0.6   





    gm Calcium Gluconate 1 gm   





    In Amino Acid 5%-D15w 1,   





    000 ml @ 55 mls/hr IV .   





    [id number] [facility] Rx#:[id number]   


 


    Mvi, Adult No.4 with Vit   385





    K 10 ml Trace (Conc-1Ml/   





    Dose) 1 ml Potassium   





    Chloride 60 meq Potassium   





    Phosphate 30 mmol   





    Magnesium Sulfate gm 0.6   





    gm Calcium Gluconate 1 gm   





    In Amino Acid 5%-D15w 1,   





    000 ml @ 55 mls/hr IV .   





    [id number] [facility] Rx#:[id number]   


 


    Sodium Chloride 0.45% 1, 240 240 160





    000 ml @ 20 mls/hr IV .   





    Q24H [facility] Rx#:[id number]   


 


    Sodium Chloride 0.9% 1,00   5





    mL   


 


    metroNIDAZOLE-NS  200 100 100





    mg   


 


  Intake, IV Titration  1062.2 





  Amount   


 


    Mvi, Adult No.4 with Vit  1062.2 





    K 10 ml Trace (Conc-1Ml/   





    Dose) 1 ml Potassium   





    Chloride 60 meq Potassium   





    Phosphate 30 mmol   





    Magnesium Sulfate gm 0.6   





    gm Calcium Gluconate 1 gm   





    In Amino Acid 5%-D15w 1,   





    000 ml @ 55 mls/hr IV .   





    [id number] [facility] Rx#:[id number]   


 


  Oral  250 


 


Output:   


 


  Drainage 40  


 


    Right Lower Abdomen 40  


 


  Urine 1575 1450 1200


 


  Stool 200 850 100


 


Other:   


 


  Voiding Method Indwelling Catheter Indwelling Catheter Indwelling Catheter








                       ABP, PAP, CO, CI - Last Documented











Arterial Blood Pressure        89/47

















- Labs


CBC & Chem 7: 


                                 06/25/20 05:25





                                 06/25/20 05:25


Labs: 


                  Abnormal Lab Results - Last 24 Hours (Table)











  06/25/20 06/25/20 06/25/20 Range/Units





  05:25 05:25 17:33 


 


WBC   13.7 H   (3.8-10.6)  k/uL


 


RBC   3.79 L   (3.80-5.40)  m/uL


 


RDW   17.0 H   (11.5-15.5)  %


 


Neutrophils #   11.6 H   (1.3-7.7)  k/uL


 


Sodium  133 L    (137-145)  mmol/L


 


Creatinine  0.34 L    (0.52-1.04)  mg/dL


 


Glucose  114 H    (74-99)  mg/dL


 


POC Glucose (mg/dL)    101 H  (75-99)  mg/dL


 


Calcium  8.1 L    (8.4-10.2)  mg/dL








                      Microbiology - Last 24 Hours (Table)











 06/24/20 13:50 Blood Culture - Preliminary





 Blood    No Growth after 24 hours


 


 06/19/20 05:10 Blood Culture - Final





 Blood    No Growth after 144 hours














Assessment and Plan


(1) Peritonitis (acute) generalized


Current Visit: Yes   Status: Acute   Code(s): K65.0 - GENERALIZED (ACUTE) 

PERITONITIS   SNOMED Code(s): 32816420


   





(2) History of gastric bypass


Current Visit: Yes   Status: Acute   Code(s): Z98.84 - BARIATRIC SURGERY STATUS 

 SNOMED Code(s): 376784426


   





(3) Medical non-compliance


Current Visit: Yes   Status: Acute   Code(s): Z91.19 - PATIENT'S NONCOMPLIANCE W

OTH MEDICAL TREATMENT AND REGIMEN   SNOMED Code(s): 710581936


   





(4) Tobacco abuse


Current Visit: Yes   Status: Acute   Code(s): Z72.0 - TOBACCO USE   SNOMED 

Code(s): 418072849


   





(5) Tobacco abuse counseling


Current Visit: Yes   Status: Acute   Code(s): Z71.6 - TOBACCO ABUSE COUNSELING  

SNOMED Code(s): 411296846


   





(6) Depressive disorder


Current Visit: Yes   Status: Acute   Code(s): F32.9 - MAJOR DEPRESSIVE DISORDER,

SINGLE EPISODE, UNSPECIFIED   SNOMED Code(s): 73731894


   





(7) Hypothyroidism


Current Visit: Yes   Status: Acute   Code(s): E03.9 - HYPOTHYROIDISM, 

UNSPECIFIED   SNOMED Code(s): 96211542


   





(8) Abdominal pain


Current Visit: Yes   Status: Acute   Code(s): R10.9 - UNSPECIFIED ABDOMINAL PAIN

  SNOMED Code(s): 63970483


   





(9) Free intraperitoneal air


Current Visit: Yes   Status: Acute   Code(s): K66.8 - OTHER SPECIFIED DISORDERS 

OF PERITONEUM   SNOMED Code(s): 49638522


   





(10) Dehydration


Current Visit: Yes   Status: Acute   Code(s): E86.0 - DEHYDRATION   SNOMED 

Code(s): 36966232

## 2020-06-23 NOTE — PN
PROGRESS NOTE



DATE OF SERVICE:

06/23/2020



REASON FOR FOLLOWUP:

Abdominal abscess from perforated diverticulitis.



INTERVAL HISTORY:

The patient is currently afebrile. The patient has been extubated.  She is breathing

comfortably on 2 L nasal cannula oxygen.  Mentioned she wants to go home.  No chest

pain, shortness of breath or cough.  Abdominal pain is currently controlled.



PHYSICAL EXAMINATION:

Blood pressure 118/64 with a pulse of 83, temperature 98.5.  She is 96% on 2 L nasal

cannula.

General description is a middle-aged female lying in bed in no distress.

RESPIRATORY SYSTEM: Unlabored breathing, clear to auscultation anteriorly.  HEART: S1,

S2.  Regular rate and rhythm.

ABDOMEN:  Soft, mildly tender:  No guarding or rigidity.



LABS:

No new labs have been obtained today.



DIAGNOSTIC IMPRESSION AND PLAN:

Patient with abdominal abscess from perforated sigmoid diverticulitis status post

laparotomy and diverting colostomy drainage of the abscess.  Culture has been

predominantly anaerobes.  Patient is covered with Unasyn to continue and will monitor

clinical course closely.





MMODL / IJN: [id number] / Job#: [id number]

## 2020-06-24 LAB
ANION GAP SERPL CALC-SCNC: 4 MMOL/L
BASOPHILS # BLD AUTO: 0 K/UL (ref 0–0.2)
BASOPHILS NFR BLD AUTO: 0 %
BUN SERPL-SCNC: 17 MG/DL (ref 7–17)
CALCIUM SPEC-MCNC: 8.1 MG/DL (ref 8.4–10.2)
CHLORIDE SERPL-SCNC: 109 MMOL/L (ref 98–107)
CO2 SERPL-SCNC: 24 MMOL/L (ref 22–30)
EOSINOPHIL # BLD AUTO: 0.2 K/UL (ref 0–0.7)
EOSINOPHIL NFR BLD AUTO: 2 %
ERYTHROCYTE [DISTWIDTH] IN BLOOD BY AUTOMATED COUNT: 3.51 M/UL (ref 3.8–5.4)
ERYTHROCYTE [DISTWIDTH] IN BLOOD: 16.8 % (ref 11.5–15.5)
GLUCOSE BLD-MCNC: 113 MG/DL (ref 75–99)
GLUCOSE BLD-MCNC: 88 MG/DL (ref 75–99)
GLUCOSE BLD-MCNC: 90 MG/DL (ref 75–99)
GLUCOSE SERPL-MCNC: 105 MG/DL (ref 74–99)
HCT VFR BLD AUTO: 34.3 % (ref 34–46)
HGB BLD-MCNC: 11.4 GM/DL (ref 11.4–16)
LYMPHOCYTES # SPEC AUTO: 1.1 K/UL (ref 1–4.8)
LYMPHOCYTES NFR SPEC AUTO: 10 %
MAGNESIUM SPEC-SCNC: 1.9 MG/DL (ref 1.6–2.3)
MCH RBC QN AUTO: 32.5 PG (ref 25–35)
MCHC RBC AUTO-ENTMCNC: 33.3 G/DL (ref 31–37)
MCV RBC AUTO: 97.6 FL (ref 80–100)
MONOCYTES # BLD AUTO: 0.4 K/UL (ref 0–1)
MONOCYTES NFR BLD AUTO: 3 %
NEUTROPHILS # BLD AUTO: 9.5 K/UL (ref 1.3–7.7)
NEUTROPHILS NFR BLD AUTO: 84 %
PLATELET # BLD AUTO: 239 K/UL (ref 150–450)
POTASSIUM SERPL-SCNC: 4.3 MMOL/L (ref 3.5–5.1)
SODIUM SERPL-SCNC: 137 MMOL/L (ref 137–145)
WBC # BLD AUTO: 11.3 K/UL (ref 3.8–10.6)

## 2020-06-24 RX ADMIN — SODIUM CHLORIDE SCH MG: 900 INJECTION, SOLUTION INTRAVENOUS at 21:19

## 2020-06-24 RX ADMIN — AMPICILLIN SODIUM AND SULBACTAM SODIUM SCH MLS/HR: 2; 1 INJECTION, POWDER, FOR SOLUTION INTRAMUSCULAR; INTRAVENOUS at 12:09

## 2020-06-24 RX ADMIN — METRONIDAZOLE SCH MLS/HR: 500 INJECTION, SOLUTION INTRAVENOUS at 12:09

## 2020-06-24 RX ADMIN — METRONIDAZOLE SCH MLS/HR: 500 INJECTION, SOLUTION INTRAVENOUS at 17:30

## 2020-06-24 RX ADMIN — SPIRONOLACTONE SCH MG: 25 TABLET, FILM COATED ORAL at 08:45

## 2020-06-24 RX ADMIN — HEPARIN SODIUM SCH UNIT: 5000 INJECTION, SOLUTION INTRAVENOUS; SUBCUTANEOUS at 21:19

## 2020-06-24 RX ADMIN — INSULIN ASPART SCH: 100 INJECTION, SOLUTION INTRAVENOUS; SUBCUTANEOUS at 17:30

## 2020-06-24 RX ADMIN — PANTOPRAZOLE SODIUM SCH MG: 40 INJECTION, POWDER, FOR SOLUTION INTRAVENOUS at 08:45

## 2020-06-24 RX ADMIN — SOYBEAN OIL SCH MLS/HR: 20 INJECTION, SOLUTION INTRAVENOUS at 12:09

## 2020-06-24 RX ADMIN — SODIUM BICARBONATE SCH: 84 INJECTION, SOLUTION INTRAVENOUS at 17:33

## 2020-06-24 RX ADMIN — AMPICILLIN SODIUM AND SULBACTAM SODIUM SCH MLS/HR: 2; 1 INJECTION, POWDER, FOR SOLUTION INTRAMUSCULAR; INTRAVENOUS at 05:25

## 2020-06-24 RX ADMIN — ACETAMINOPHEN PRN MG: 325 TABLET, FILM COATED ORAL at 21:19

## 2020-06-24 RX ADMIN — ACETAMINOPHEN PRN MG: 325 TABLET, FILM COATED ORAL at 14:09

## 2020-06-24 RX ADMIN — NICOTINE SCH PATCH: 14 PATCH, EXTENDED RELEASE TRANSDERMAL at 09:48

## 2020-06-24 RX ADMIN — LEUCINE, PHENYLALANINE, LYSINE, METHIONINE, ISOLEUCINE, VALINE, HISTIDINE, THREONINE, TRYPTOPHAN, ALANINE, GLYCINE, ARGININE, PROLINE, SERINE, TYROSINE, DEXTROSE SCH MLS/HR: 365; 280; 290; 200; 300; 290; 240; 210; 90; 1035; 515; 575; 340; 250; 20; 15 INJECTION INTRAVENOUS at 18:13

## 2020-06-24 RX ADMIN — AMPICILLIN SODIUM AND SULBACTAM SODIUM SCH MLS/HR: 2; 1 INJECTION, POWDER, FOR SOLUTION INTRAMUSCULAR; INTRAVENOUS at 17:30

## 2020-06-24 RX ADMIN — METRONIDAZOLE SCH MLS/HR: 500 INJECTION, SOLUTION INTRAVENOUS at 05:26

## 2020-06-24 RX ADMIN — NOREPINEPHRINE BITARTRATE SCH: 1 INJECTION, SOLUTION, CONCENTRATE INTRAVENOUS at 20:58

## 2020-06-24 RX ADMIN — LEUCINE, PHENYLALANINE, LYSINE, METHIONINE, ISOLEUCINE, VALINE, HISTIDINE, THREONINE, TRYPTOPHAN, ALANINE, GLYCINE, ARGININE, PROLINE, SERINE, TYROSINE, DEXTROSE SCH MLS/HR: 365; 280; 290; 200; 300; 290; 240; 210; 90; 1035; 515; 575; 340; 250; 20; 15 INJECTION INTRAVENOUS at 00:37

## 2020-06-24 RX ADMIN — INSULIN ASPART SCH: 100 INJECTION, SOLUTION INTRAVENOUS; SUBCUTANEOUS at 05:31

## 2020-06-24 RX ADMIN — SODIUM CHLORIDE SCH MG: 900 INJECTION, SOLUTION INTRAVENOUS at 08:46

## 2020-06-24 RX ADMIN — INSULIN ASPART SCH: 100 INJECTION, SOLUTION INTRAVENOUS; SUBCUTANEOUS at 12:09

## 2020-06-24 RX ADMIN — METOPROLOL SUCCINATE SCH MG: 25 TABLET, EXTENDED RELEASE ORAL at 08:45

## 2020-06-24 RX ADMIN — LISINOPRIL SCH MG: 5 TABLET ORAL at 08:45

## 2020-06-24 RX ADMIN — HEPARIN SODIUM SCH UNIT: 5000 INJECTION, SOLUTION INTRAVENOUS; SUBCUTANEOUS at 08:45

## 2020-06-24 RX ADMIN — LEVOTHYROXINE SODIUM ANHYDROUS SCH MCG: 100 INJECTION, POWDER, LYOPHILIZED, FOR SOLUTION INTRAVENOUS at 08:46

## 2020-06-24 NOTE — P.PN
<LeoUyenRoula A - Last Filed: 06/24/20 13:34>





Subjective


Progress Note Date: 06/24/20





CHIEF COMPLAINT: Abdominal pain





HISTORY OF PRESENT ILLNESS: 59-year-old female who underwent exploratory 

laparotomy with sigmoid colectomy, descending colostomy creation, and 

appendectomy with Dr. Reed on June 14, 2020. Patient was extubated 

yesterday. She is awake and alert. Denies abdominal pain. Tolerating liquid 

diet. Remains on TPN.





PHYSICAL EXAM: 


VITAL SIGNS: Reviewed


GENERAL: Well-developed in no acute distress


HEENT:  No sclera icterus. Extraocular movements grossly intact.  Moist buccal 

mucosa. 


Head is atraumatic, normocephalic. No nasal drainage.


NECK:  Supple without lymphadenopathy.


CHEST:  Non-labored respirations and equal bilateral excursions


CARDIOVASCULAR:  Regular rate with regular rhythm.  Palpable 2+ radial pulses.


ABDOMEN:  Soft.  Nondistended. Dressing clean dry intact. Ostomy to left side of

abdomen. Stoma pink. TERESSA drain with serous drainage.


MUSCULOSKELETAL:  No clubbing or cyanosis.


NEUROLOGIC:  No focal or lateralizing signs.  Cranial nerves II through XII 

grossly intact.


PSYCH:  Appropriate affect.  Alert and oriented to person, place and time.


SKIN: Well perfused.  Good skin turgor. 





ASSESSMENT: 


1.  Abdominal pain secondary to ruptured sigmoid colon, fecal peritonitis, 

descending and sigmoid colon impaction, appendicolith with appendicitis





PLAN: 


Continue TPN until PO intake improves


Advance diet


Monitor TERESSA drain


Continue antibiotics. Monitor labs


Further ICU management per pulmonary





Nurse practitioner note has been reviewed by physician. Signing provider agrees 

with the documented findings, assessment, and plan of care. 








Objective





- Vital Signs


Vital signs: 


                                   Vital Signs











Temp  98.5 F   06/24/20 00:00


 


Pulse  84   06/24/20 09:00


 


Resp  20   06/24/20 09:00


 


BP  103/66   06/24/20 07:00


 


Pulse Ox  97   06/24/20 09:00








                                 Intake & Output











 06/23/20 06/24/20 06/24/20





 18:59 06:59 18:59


 


Intake Total 8729.598 4881.2 225


 


Output Total 1170 860 380


 


Balance 157.682 2163.2 -155


 


Weight  81.7 kg 81.7 kg


 


Intake:   


 


  IV 1400 1300 225


 


    Acetaminophen 1000mg 100  


 


    Ampicillin-Sulbactam 3 gm 200 200 





    In Sodium Chloride 0.9%   





    100 ml @ 200 mls/hr IVPB   





    Q6HR [facility] Rx#:[id number]   


 


    Mvi, Adult No.4 with Vit 660 660 165





    K 10 ml Trace (Conc-1Ml/   





    Dose) 1 ml Potassium   





    Chloride 60 meq Potassium   





    Phosphate 30 mmol   





    Magnesium Sulfate gm 0.6   





    gm Calcium Gluconate 1 gm   





    In Amino Acid 5%-D15w 1,   





    000 ml @ 55 mls/hr IV .   





    [id number] [facility] Rx#:[id number]   


 


    Sodium Chloride 0.45% 1, 240 240 60





    000 ml @ 20 mls/hr IV .   





    Q24H [facility] Rx#:[id number]   


 


    metroNIDAZOLE-NS  200 200 





    mg   


 


  Intake, IV Titration 141.568 3240.2 





  Amount   


 


    Mvi, Adult No.4 with Vit  1062.2 





    K 10 ml Trace (Conc-1Ml/   





    Dose) 1 ml Potassium   





    Chloride 60 meq Potassium   





    Phosphate 30 mmol   





    Magnesium Sulfate gm 0.6   





    gm Calcium Gluconate 1 gm   





    In Amino Acid 5%-D15w 1,   





    000 ml @ 55 mls/hr IV .   





    [id number] [facility] Rx#:[id number]   


 


    Norepinephrine 8 mg In 81.6  





    Sodium Chloride 0.9% 250   





    ml @ 0.05 MCG/KG/MIN 6.   





    405 mls/hr IV .Q24H [facility]   





    Rx#:[id number]   


 


    Propofol 1,000 mg In 81.095  





    Empty Bag 1 bag @ Titrate   





    IV .Q0M [facility] Rx#:   





    [id number]   


 


Output:   


 


  Drainage 20  30


 


    Right Lower Abdomen 20  30


 


  Urine 1150 860 350


 


Other:   


 


  Voiding Method Indwelling Catheter Indwelling Catheter 








                       ABP, PAP, CO, CI - Last Documented











Arterial Blood Pressure        117/62

















- Labs


CBC & Chem 7: 


                                 06/24/20 05:35





                                 06/24/20 05:35


Labs: 


                  Abnormal Lab Results - Last 24 Hours (Table)











  06/23/20 06/23/20 06/24/20 Range/Units





  18:10 23:27 05:30 


 


WBC     (3.8-10.6)  k/uL


 


RBC     (3.80-5.40)  m/uL


 


RDW     (11.5-15.5)  %


 


Neutrophils #     (1.3-7.7)  k/uL


 


Chloride     ()  mmol/L


 


Creatinine     (0.52-1.04)  mg/dL


 


Glucose     (74-99)  mg/dL


 


POC Glucose (mg/dL)  121 H  147 H  113 H  (75-99)  mg/dL


 


Calcium     (8.4-10.2)  mg/dL














  06/24/20 06/24/20 Range/Units





  05:35 05:35 


 


WBC   11.3 H  (3.8-10.6)  k/uL


 


RBC   3.51 L  (3.80-5.40)  m/uL


 


RDW   16.8 H  (11.5-15.5)  %


 


Neutrophils #   9.5 H  (1.3-7.7)  k/uL


 


Chloride  109 H   ()  mmol/L


 


Creatinine  0.34 L   (0.52-1.04)  mg/dL


 


Glucose  105 H   (74-99)  mg/dL


 


POC Glucose (mg/dL)    (75-99)  mg/dL


 


Calcium  8.1 L   (8.4-10.2)  mg/dL








                      Microbiology - Last 24 Hours (Table)











 06/19/20 05:10 Blood Culture - Preliminary





 Blood    No Growth after 120 hours














<Socorro Reed N - Last Filed: 06/26/20 03:12>





Subjective





Patient seen and evaluated nurse practitioner.  Agree with above.  Patient's 

neurological status moderately improved.  Would recommend evaluation for oral 

feeds from bariatric dietitian consult to manage oral diet.  Will need physical 

therapy occupational therapy.  Per patient's family wishes, rehab facility [facility] requested.





Objective





- Vital Signs


Vital signs: 


                                   Vital Signs











Temp  98.1 F   06/26/20 00:00


 


Pulse  79   06/26/20 02:00


 


Resp  22   06/26/20 02:00


 


BP  95/74   06/25/20 20:00


 


Pulse Ox  96   06/26/20 02:00








                                 Intake & Output











 06/25/20 06/25/20 06/26/20





 06:59 18:59 06:59


 


Intake Total 1300 2538.2 876


 


Output Total 1815 2300 1300


 


Balance -515 238.2 -424


 


Weight 79 kg  


 


Intake:   


 


  IV 1300 1226 876


 


    Ampicillin-Sulbactam 3 gm 200 100 100





    In Sodium Chloride 0.9%   





    100 ml @ 200 mls/hr IVPB   





    Q6HR [facility] Rx#:[id number]   


 


    Fat Emulsion 20% 250 mL@  126 126





    20.833mls/hr   


 


    Mvi, Adult No.4 with Vit 660 660 





    K 10 ml Trace (Conc-1Ml/   





    Dose) 1 ml Potassium   





    Chloride 60 meq Potassium   





    Phosphate 30 mmol   





    Magnesium Sulfate gm 0.6   





    gm Calcium Gluconate 1 gm   





    In Amino Acid 5%-D15w 1,   





    000 ml @ 55 mls/hr IV .   





    [id number] [facility] Rx#:[id number]   


 


    Mvi, Adult No.4 with Vit   385





    K 10 ml Trace (Conc-1Ml/   





    Dose) 1 ml Potassium   





    Chloride 60 meq Potassium   





    Phosphate 30 mmol   





    Magnesium Sulfate gm 0.6   





    gm Calcium Gluconate 1 gm   





    In Amino Acid 5%-D15w 1,   





    000 ml @ 55 mls/hr IV .   





    [id number] [facility] Rx#:[id number]   


 


    Sodium Chloride 0.45% 1, 240 240 160





    000 ml @ 20 mls/hr IV .   





    Q24H [facility] Rx#:[id number]   


 


    Sodium Chloride 0.9% 1,00   5





    mL   


 


    metroNIDAZOLE-NS  200 100 100





    mg   


 


  Intake, IV Titration  1062.2 





  Amount   


 


    Mvi, Adult No.4 with Vit  1062.2 





    K 10 ml Trace (Conc-1Ml/   





    Dose) 1 ml Potassium   





    Chloride 60 meq Potassium   





    Phosphate 30 mmol   





    Magnesium Sulfate gm 0.6   





    gm Calcium Gluconate 1 gm   





    In Amino Acid 5%-D15w 1,   





    000 ml @ 55 mls/hr IV .   





    [id number] [facility] Rx#:[id number]   


 


  Oral  250 


 


Output:   


 


  Drainage 40  


 


    Right Lower Abdomen 40  


 


  Urine 1575 1450 1200


 


  Stool 200 850 100


 


Other:   


 


  Voiding Method Indwelling Catheter Indwelling Catheter Indwelling Catheter








                       ABP, PAP, CO, CI - Last Documented











Arterial Blood Pressure        89/47

















- Labs


CBC & Chem 7: 


                                 06/25/20 05:25





                                 06/25/20 05:25


Labs: 


                  Abnormal Lab Results - Last 24 Hours (Table)











  06/25/20 06/25/20 06/25/20 Range/Units





  05:25 05:25 17:33 


 


WBC   13.7 H   (3.8-10.6)  k/uL


 


RBC   3.79 L   (3.80-5.40)  m/uL


 


RDW   17.0 H   (11.5-15.5)  %


 


Neutrophils #   11.6 H   (1.3-7.7)  k/uL


 


Sodium  133 L    (137-145)  mmol/L


 


Creatinine  0.34 L    (0.52-1.04)  mg/dL


 


Glucose  114 H    (74-99)  mg/dL


 


POC Glucose (mg/dL)    101 H  (75-99)  mg/dL


 


Calcium  8.1 L    (8.4-10.2)  mg/dL








                      Microbiology - Last 24 Hours (Table)











 06/24/20 13:50 Blood Culture - Preliminary





 Blood    No Growth after 24 hours


 


 06/19/20 05:10 Blood Culture - Final





 Blood    No Growth after 144 hours














Assessment and Plan


(1) Peritonitis (acute) generalized


Current Visit: Yes   Status: Acute   Code(s): K65.0 - GENERALIZED (ACUTE) 

PERITONITIS   SNOMED Code(s): 56549359


   





(2) History of gastric bypass


Current Visit: Yes   Status: Acute   Code(s): Z98.84 - BARIATRIC SURGERY STATUS 

 SNOMED Code(s): 269800563


   





(3) Medical non-compliance


Current Visit: Yes   Status: Acute   Code(s): Z91.19 - PATIENT'S NONCOMPLIANCE W

OTH MEDICAL TREATMENT AND REGIMEN   SNOMED Code(s): 445269469


   





(4) Tobacco abuse


Current Visit: Yes   Status: Acute   Code(s): Z72.0 - TOBACCO USE   SNOMED 

Code(s): 604599981


   





(5) Tobacco abuse counseling


Current Visit: Yes   Status: Acute   Code(s): Z71.6 - TOBACCO ABUSE COUNSELING  

SNOMED Code(s): 236086269


   





(6) Depressive disorder


Current Visit: Yes   Status: Acute   Code(s): F32.9 - MAJOR DEPRESSIVE DISORDER,

SINGLE EPISODE, UNSPECIFIED   SNOMED Code(s): 29488590


   





(7) Hypothyroidism


Current Visit: Yes   Status: Acute   Code(s): E03.9 - HYPOTHYROIDISM, 

UNSPECIFIED   SNOMED Code(s): 43724966


   





(8) Abdominal pain


Current Visit: Yes   Status: Acute   Code(s): R10.9 - UNSPECIFIED ABDOMINAL PAIN

  SNOMED Code(s): 22671501


   





(9) Free intraperitoneal air


Current Visit: Yes   Status: Acute   Code(s): K66.8 - OTHER SPECIFIED DISORDERS 

OF PERITONEUM   SNOMED Code(s): 27961012


   





(10) Dehydration


Current Visit: Yes   Status: Acute   Code(s): E86.0 - DEHYDRATION   SNOMED 

Code(s): 49937759

## 2020-06-24 NOTE — XR
EXAMINATION TYPE: XR chest 1V portable

 

DATE OF EXAM: 6/24/2020

 

Comparison: 6/23/2020

 

Clinical History: 59 year-old female tube placement

 

Findings:

Heart normal size. Aorta and pulmonary vasculature within normal limits. Interval extubation and mirna
ana maria of NG tube. Right IJ CVC tip remains at the cavoatrial junction. Improving aeration at the left b
ase with residual retrocardiac opacity.

 

 

Impression:

Improving aeration at the left base but with residual retrocardiac airspace disease and/or atelectasi
s.

## 2020-06-24 NOTE — PN
PROGRESS NOTE



Anna is a 59-year-old lady that is admitted to hospital for perforated viscus and

had Takotsubo syndrome.  She is extubated, appears alert and is able to communicate

well today.  Denies chest pain or difficulty in breathing.  Currently on Zestril 5 mg

daily, Toprol-XL 12.5 mg daily along with Aldactone.



EXAM:

Heart rate is 80 beats per minute.  Blood pressure is 102/66.  Respirations 18.  O2

saturation is 97% on 2 L.

There is no jugular venous distention.

Chest exam reveals good air entry bilaterally.  Heart exam reveals first and second

heart sounds.  No gallop.

Exam of extremities did not reveal any edema.  Peripheral pulses are felt.



LABS:

Show a hemoglobin of 11.4, potassium is 4.3, creatinine is 0.34.



ASSESSMENT:

1. Takotsubo syndrome.

2. Perforated viscus.



PLAN:

Patient will continue with current medications.





MMODL / IJN: [id number] / Job#: [id number]

## 2020-06-24 NOTE — P.PN
Subjective


Progress Note Date: 06/24/20





On today's evaluation of 06/22/2020, I'm seeing this patient for a follow-up in 

the intensive care unit.  The patient remains intubated on a mechanical 

ventilator.  The patient is currently sedated and the patient is receiving 

propofol at a dose of 50 mcg/kg per minute.  The patient is on IV fluids at 0.45

saline at the rate of 20 mL an hour.  The patient is on TPN for nutritional 

support running at 55 mL an hour and the patient is on norepinephrine infusion 

at 8.8 micrograms per minutes.  In terms of vent support, the patient remains on

a mechanical ventilator.  She is an assist-control mode with a rate of 20 with a

tidal volume of 350 and FiO2 of 50% with a PEEP of 10.  Earlier blood gases 

showed a pH of 7.5 with a pCO2 of 43 and pO2 of 100.  Necessity ventilator 

changes were done.  Noted the patient is post perforated sigmoid colon and the 

patient is postop day #8.  She had to be reintubated on 06/19/2020 acute hypoxic

respiratory failure and development of bilateral pulmonary infiltrates.  The 

intra-abdominal cultures are showing anaerobic gram-negative bacillus 2 and 

Clostridium perfringens.  Antibiotic coverage remains IV Unasyn and Flagyl for 

now.  The patient on TPN for nutritional support.  The chest x-ray from today 

shows mild stable left lower lobe pulmonary infiltrate.  There is also small 

right sided pleural effusion.  There is blunting of the right costophrenic 

angle.  Small right-sided pleural effusion developed.  ET tube is in a good 

location for now.





On 06/23/2020 and seeing this patient for a follow-up.  The patient is calm and 

comfortable sedated on a mechanical ventilator.  This morning she is on propofol

at the rate of 50 g per KG per minute.  The patient is also receiving TPN at 

the rate of 55 mL an hour.  She is arousable and the patient is gradually being 

weaned off the sedation as the patient is being given a sedation holiday this 

morning.  She remains on assist control mode at the rate of 20 with a tidal 

volume of 350 and FiO2 of 40% with a PEEP of 5.  Blood gases from today showed a

pH of 7.48 with a pCO2 of 40 and pO2 of 106.  Chest x-ray shows significant 

changes compared to yesterday.  There are small better pleural effusion and 

suspected.  No change in the left basilar opacity.  The patient has a functional

colostomy.  There is some stool extubating in the colostomy bag.  Surgical wound

site is dry clean and intact.  The patient is still on pressors and she is 

requiring norepinephrine infusion rate of 5 mg/m.  She is on TPN at the rate of 

55 mL an hour.  She has adequate urine output.  No other significant events 

overnight.  During the course of my evaluation, I stop the sedation, check 

weaning parameters and following that I give the patient is point is breathing 

trial with a pressure support of 5 and a PEEP of 5.  After being on the setting 

for around 30 minutes, the patient up with a pH of 7.51 with a pCO2 of 36 and p

O2 of 87 and the patient is currently being extubated.





On 06/24/2020 and seeing the patient for a follow-up.  The patient is currently 

extubated and she is calm and comfortable.  The patient is currently on 2 L of 

oxygen by nasal cannula with a pulse is 97%.  The patient will have a swallow 

evaluation did I already checked a bedside swallow and I gave her some clear 

liquid diet.  She is on Diprivan which is running at 55 an hour and she is also 

on half normal saline 23 an hour.  Colostomy site is functioning.  She is weak 

and she has global weakness and maybe motor weakness in both upper and lower 

extremities.  Overall surgical wound site is dry clean and intact.  She has been

producing adequate amount of urine output.  No fever or chills.  Antibiotic 

coverage is still the same and the patient remains on a combination of Unasyn 

and Flagyl.note that the patient off norepinephrine infusion. has been also 

weaned off the pressors and she is currently





Objective





- Vital Signs


Vital signs: 


                                   Vital Signs











Temp  98.5 F   06/24/20 00:00


 


Pulse  80   06/24/20 13:00


 


Resp  20   06/24/20 13:00


 


BP  103/66   06/24/20 07:00


 


Pulse Ox  97   06/24/20 13:00








                                 Intake & Output











 06/23/20 06/24/20 06/24/20





 18:59 06:59 18:59


 


Intake Total 2919.369 1588.2 650


 


Output Total 1170 860 730


 


Balance 102.170 4751.2 -80


 


Weight  81.7 kg 81.7 kg


 


Intake:   


 


  IV 1400 1300 650


 


    Acetaminophen 1000mg 100  


 


    Ampicillin-Sulbactam 3 gm 200 200 100





    In Sodium Chloride 0.9%   





    100 ml @ 200 mls/hr IVPB   





    Q6HR [facility] Rx#:[id number]   


 


    Mvi, Adult No.4 with Vit 660 660 330





    K 10 ml Trace (Conc-1Ml/   





    Dose) 1 ml Potassium   





    Chloride 60 meq Potassium   





    Phosphate 30 mmol   





    Magnesium Sulfate gm 0.6   





    gm Calcium Gluconate 1 gm   





    In Amino Acid 5%-D15w 1,   





    000 ml @ 55 mls/hr IV .   





    [id number] [facility] Rx#:[id number]   


 


    Sodium Chloride 0.45% 1, 240 240 120





    000 ml @ 20 mls/hr IV .   





    Q24H [facility] Rx#:[id number]   


 


    metroNIDAZOLE-NS  200 200 100





    mg   


 


  Intake, IV Titration 267.375 2954.2 





  Amount   


 


    Mvi, Adult No.4 with Vit  1062.2 





    K 10 ml Trace (Conc-1Ml/   





    Dose) 1 ml Potassium   





    Chloride 60 meq Potassium   





    Phosphate 30 mmol   





    Magnesium Sulfate gm 0.6   





    gm Calcium Gluconate 1 gm   





    In Amino Acid 5%-D15w 1,   





    000 ml @ 55 mls/hr IV .   





    [id number] [facility] Rx#:[id number]   


 


    Norepinephrine 8 mg In 81.6  





    Sodium Chloride 0.9% 250   





    ml @ 0.05 MCG/KG/MIN 6.   





    405 mls/hr IV .Q24H [facility]   





    Rx#:[id number]   


 


    Propofol 1,000 mg In 81.095  





    Empty Bag 1 bag @ Titrate   





    IV .Q0M [facility] Rx#:   





    [id number]   


 


Output:   


 


  Drainage 20  30


 


    Right Lower Abdomen 20  30


 


  Urine 1150 860 700


 


Other:   


 


  Voiding Method Indwelling Catheter Indwelling Catheter 








                       ABP, PAP, CO, CI - Last Documented











Arterial Blood Pressure        117/61

















- Exam








GENERAL: Well-developed in no acute distress,  the patient has been extubated 

currently on 2 L of oxygen by nasal cannula


HEENT:  No sclera icterus. Extraocular movements grossly intact.  Moist buccal 

mucosa. 


Head is atraumatic, normocephalic. No nasal drainage.


NECK:  Supple without lymphadenopathy.


CHEST:  Non-labored respirations and equal bilateral excursions


CARDIOVASCULAR:  Tachycardic.  Regular rate with regular rhythm.  Palpable 2+ 

radial pulses.


ABDOMEN:  Soft.  Nondistended. PREVENA wound vac noted. Ostomy to left side of 

abdomen. No stool or gas noted. Stoma pink. TERESSA drain with serous drainage.


MUSCULOSKELETAL:  No clubbing or cyanosis.


NEUROLOGIC: The patient is awake and alert and there is no focal neurological 

deficits.  There is global weakness and the motor functions both in the upper 

and lower extremities.


PSYCH:  Unable to assess secondary to altered mental status


SKIN: Well perfused.  Good skin turgor. 


 





- Labs


CBC & Chem 7: 


                                 06/24/20 05:35





                                 06/24/20 05:35


Labs: 


                  Abnormal Lab Results - Last 24 Hours (Table)











  06/23/20 06/23/20 06/24/20 Range/Units





  18:10 23:27 05:30 


 


WBC     (3.8-10.6)  k/uL


 


RBC     (3.80-5.40)  m/uL


 


RDW     (11.5-15.5)  %


 


Neutrophils #     (1.3-7.7)  k/uL


 


Chloride     ()  mmol/L


 


Creatinine     (0.52-1.04)  mg/dL


 


Glucose     (74-99)  mg/dL


 


POC Glucose (mg/dL)  121 H  147 H  113 H  (75-99)  mg/dL


 


Calcium     (8.4-10.2)  mg/dL














  06/24/20 06/24/20 Range/Units





  05:35 05:35 


 


WBC   11.3 H  (3.8-10.6)  k/uL


 


RBC   3.51 L  (3.80-5.40)  m/uL


 


RDW   16.8 H  (11.5-15.5)  %


 


Neutrophils #   9.5 H  (1.3-7.7)  k/uL


 


Chloride  109 H   ()  mmol/L


 


Creatinine  0.34 L   (0.52-1.04)  mg/dL


 


Glucose  105 H   (74-99)  mg/dL


 


POC Glucose (mg/dL)    (75-99)  mg/dL


 


Calcium  8.1 L   (8.4-10.2)  mg/dL








                      Microbiology - Last 24 Hours (Table)











 06/19/20 05:10 Blood Culture - Preliminary





 Blood    No Growth after 120 hours














Assessment and Plan


Plan: 








1 ruptured sigmoid colon with fecal peritonitis, along with descending and sig

moid colon impaction and appendicolith and appendicitis.  The patient underwent 

expose a laparotomy with sigmoid colectomy and descending colostomy creation and

appendectomy.  Surgery was done on 06/14/2020.  The patient is recovering well 

from surgery and the patient has some bowel activity and the colostomy bag 





2 acute hypoxic respiratory failure and the patient had to be reintubated on 

06/19/2024 hypoxic respiratory failure and ultimately she was extubated on 

06/23/2020 currently she is on 2 L of oxygen by nasal cannula.  The follow-up 

chest x-ray from today shows improvement and improving aeration of the left lung

base with some residual retrocardiac airspace disease and atelectasis.








3 sepsis with secondary hypotension secondary to above.  The patient is still 

requiring pressors in addition to broad-spectrum antibiotics.  The intra-

abdominal culture are all anaerobes including anaerobic gram-negative bacillus 

and Clostridium perfringens.  The patient currently is off pressors and the 

patient is hemodynamically stable.





4 Hypothyroidism





5 history of smoking





6 which is moderate impairment of LV function addition to segmental wall motion 

abnormalities and some apical ballooning syndrome.  Moderate tricuspid 

regurgitation and mild pulmonary hypertension was also noted.  There is apical 

lateral LV wall motion hypokinesis.  





Plan





Initiate clear liquid diet


Advance diet as tolerated


TPN for another 24 hours and possibly we'll discontinue as the patient is 

advancing her diet


Involve physical therapy


Continue IV Unasyn and Flagyl


IV fluids to KVO


Keep same antibiotic coverage


Keep the patient ICU for 24 hours


Incentive spirometer


We'll continue to follow

## 2020-06-25 LAB
ANION GAP SERPL CALC-SCNC: 4 MMOL/L
BASOPHILS # BLD AUTO: 0 K/UL (ref 0–0.2)
BASOPHILS NFR BLD AUTO: 0 %
BUN SERPL-SCNC: 15 MG/DL (ref 7–17)
CALCIUM SPEC-MCNC: 8.1 MG/DL (ref 8.4–10.2)
CHLORIDE SERPL-SCNC: 107 MMOL/L (ref 98–107)
CO2 SERPL-SCNC: 22 MMOL/L (ref 22–30)
EOSINOPHIL # BLD AUTO: 0.2 K/UL (ref 0–0.7)
EOSINOPHIL NFR BLD AUTO: 1 %
ERYTHROCYTE [DISTWIDTH] IN BLOOD BY AUTOMATED COUNT: 3.79 M/UL (ref 3.8–5.4)
ERYTHROCYTE [DISTWIDTH] IN BLOOD: 17 % (ref 11.5–15.5)
GLUCOSE BLD-MCNC: 100 MG/DL (ref 75–99)
GLUCOSE BLD-MCNC: 101 MG/DL (ref 75–99)
GLUCOSE BLD-MCNC: 76 MG/DL (ref 75–99)
GLUCOSE BLD-MCNC: 90 MG/DL (ref 75–99)
GLUCOSE BLD-MCNC: 92 MG/DL (ref 75–99)
GLUCOSE SERPL-MCNC: 114 MG/DL (ref 74–99)
HCT VFR BLD AUTO: 36.5 % (ref 34–46)
HGB BLD-MCNC: 11.5 GM/DL (ref 11.4–16)
LYMPHOCYTES # SPEC AUTO: 1.4 K/UL (ref 1–4.8)
LYMPHOCYTES NFR SPEC AUTO: 10 %
MAGNESIUM SPEC-SCNC: 1.9 MG/DL (ref 1.6–2.3)
MCH RBC QN AUTO: 30.2 PG (ref 25–35)
MCHC RBC AUTO-ENTMCNC: 31.4 G/DL (ref 31–37)
MCV RBC AUTO: 96.4 FL (ref 80–100)
MONOCYTES # BLD AUTO: 0.4 K/UL (ref 0–1)
MONOCYTES NFR BLD AUTO: 3 %
NEUTROPHILS # BLD AUTO: 11.6 K/UL (ref 1.3–7.7)
NEUTROPHILS NFR BLD AUTO: 84 %
PLATELET # BLD AUTO: 253 K/UL (ref 150–450)
POTASSIUM SERPL-SCNC: 4.4 MMOL/L (ref 3.5–5.1)
SODIUM SERPL-SCNC: 133 MMOL/L (ref 137–145)
WBC # BLD AUTO: 13.7 K/UL (ref 3.8–10.6)

## 2020-06-25 PROCEDURE — 02HV33Z INSERTION OF INFUSION DEVICE INTO SUPERIOR VENA CAVA, PERCUTANEOUS APPROACH: ICD-10-PCS

## 2020-06-25 RX ADMIN — PANTOPRAZOLE SODIUM SCH MG: 40 INJECTION, POWDER, FOR SOLUTION INTRAVENOUS at 09:55

## 2020-06-25 RX ADMIN — METOPROLOL SUCCINATE SCH MG: 25 TABLET, EXTENDED RELEASE ORAL at 09:56

## 2020-06-25 RX ADMIN — LEUCINE, PHENYLALANINE, LYSINE, METHIONINE, ISOLEUCINE, VALINE, HISTIDINE, THREONINE, TRYPTOPHAN, ALANINE, GLYCINE, ARGININE, PROLINE, SERINE, TYROSINE, DEXTROSE SCH MLS/HR: 365; 280; 290; 200; 300; 290; 240; 210; 90; 1035; 515; 575; 340; 250; 20; 15 INJECTION INTRAVENOUS at 15:26

## 2020-06-25 RX ADMIN — HEPARIN SODIUM SCH UNIT: 5000 INJECTION, SOLUTION INTRAVENOUS; SUBCUTANEOUS at 20:38

## 2020-06-25 RX ADMIN — SODIUM CHLORIDE SCH MG: 900 INJECTION, SOLUTION INTRAVENOUS at 20:38

## 2020-06-25 RX ADMIN — AMPICILLIN SODIUM AND SULBACTAM SODIUM SCH MLS/HR: 2; 1 INJECTION, POWDER, FOR SOLUTION INTRAMUSCULAR; INTRAVENOUS at 01:01

## 2020-06-25 RX ADMIN — INSULIN ASPART SCH: 100 INJECTION, SOLUTION INTRAVENOUS; SUBCUTANEOUS at 06:25

## 2020-06-25 RX ADMIN — NOREPINEPHRINE BITARTRATE SCH: 1 INJECTION, SOLUTION, CONCENTRATE INTRAVENOUS at 20:39

## 2020-06-25 RX ADMIN — METRONIDAZOLE SCH MLS/HR: 500 INJECTION, SOLUTION INTRAVENOUS at 23:05

## 2020-06-25 RX ADMIN — INSULIN ASPART SCH: 100 INJECTION, SOLUTION INTRAVENOUS; SUBCUTANEOUS at 00:14

## 2020-06-25 RX ADMIN — SODIUM CHLORIDE SCH MG: 900 INJECTION, SOLUTION INTRAVENOUS at 09:55

## 2020-06-25 RX ADMIN — SOYBEAN OIL SCH MLS/HR: 20 INJECTION, SOLUTION INTRAVENOUS at 12:26

## 2020-06-25 RX ADMIN — AMPICILLIN SODIUM AND SULBACTAM SODIUM SCH MLS/HR: 2; 1 INJECTION, POWDER, FOR SOLUTION INTRAMUSCULAR; INTRAVENOUS at 06:24

## 2020-06-25 RX ADMIN — ACETAMINOPHEN PRN MG: 325 TABLET, FILM COATED ORAL at 15:23

## 2020-06-25 RX ADMIN — INSULIN ASPART SCH: 100 INJECTION, SOLUTION INTRAVENOUS; SUBCUTANEOUS at 17:51

## 2020-06-25 RX ADMIN — METRONIDAZOLE SCH MLS/HR: 500 INJECTION, SOLUTION INTRAVENOUS at 06:24

## 2020-06-25 RX ADMIN — ACETAMINOPHEN PRN MG: 325 TABLET, FILM COATED ORAL at 09:52

## 2020-06-25 RX ADMIN — HEPARIN SODIUM SCH UNIT: 5000 INJECTION, SOLUTION INTRAVENOUS; SUBCUTANEOUS at 09:54

## 2020-06-25 RX ADMIN — AMPICILLIN SODIUM AND SULBACTAM SODIUM SCH MLS/HR: 2; 1 INJECTION, POWDER, FOR SOLUTION INTRAMUSCULAR; INTRAVENOUS at 18:44

## 2020-06-25 RX ADMIN — LEVOTHYROXINE SODIUM ANHYDROUS SCH MCG: 100 INJECTION, POWDER, LYOPHILIZED, FOR SOLUTION INTRAVENOUS at 09:54

## 2020-06-25 RX ADMIN — ACETAMINOPHEN PRN MG: 325 TABLET, FILM COATED ORAL at 20:46

## 2020-06-25 RX ADMIN — AMPICILLIN SODIUM AND SULBACTAM SODIUM SCH MLS/HR: 2; 1 INJECTION, POWDER, FOR SOLUTION INTRAMUSCULAR; INTRAVENOUS at 23:05

## 2020-06-25 RX ADMIN — SODIUM BICARBONATE SCH MLS/HR: 84 INJECTION, SOLUTION INTRAVENOUS at 18:54

## 2020-06-25 RX ADMIN — INSULIN ASPART SCH: 100 INJECTION, SOLUTION INTRAVENOUS; SUBCUTANEOUS at 12:44

## 2020-06-25 RX ADMIN — NICOTINE SCH PATCH: 14 PATCH, EXTENDED RELEASE TRANSDERMAL at 09:55

## 2020-06-25 RX ADMIN — METRONIDAZOLE SCH MLS/HR: 500 INJECTION, SOLUTION INTRAVENOUS at 18:49

## 2020-06-25 RX ADMIN — LISINOPRIL SCH MG: 5 TABLET ORAL at 09:56

## 2020-06-25 RX ADMIN — INSULIN ASPART SCH: 100 INJECTION, SOLUTION INTRAVENOUS; SUBCUTANEOUS at 20:36

## 2020-06-25 RX ADMIN — AMPICILLIN SODIUM AND SULBACTAM SODIUM SCH MLS/HR: 2; 1 INJECTION, POWDER, FOR SOLUTION INTRAMUSCULAR; INTRAVENOUS at 15:25

## 2020-06-25 RX ADMIN — SPIRONOLACTONE SCH MG: 25 TABLET, FILM COATED ORAL at 09:55

## 2020-06-25 RX ADMIN — METRONIDAZOLE SCH MLS/HR: 500 INJECTION, SOLUTION INTRAVENOUS at 01:01

## 2020-06-25 RX ADMIN — METRONIDAZOLE SCH MLS/HR: 500 INJECTION, SOLUTION INTRAVENOUS at 12:26

## 2020-06-25 NOTE — PN
PROGRESS NOTE



DATE OF SERVICE:

06/24/2020



REASON FOR FOLLOWUP:

Perforated sigmoid diverticulitis with abdominal abscess.



INTERVAL HISTORY:

The patient is currently afebrile.  She has been breathing comfortably.  Denies having

any chest pain or any cough.  No nausea or vomiting.  Abdominal pain is currently

controlled. No diarrhea.



PHYSICAL EXAMINATION:

Blood pressure 126/61 with pulse 85, temperature 98.1.  She is 95% on room air.

General description is an elderly female lying in bed in no distress.

RESPIRATORY SYSTEM: Unlabored breathing, clear to auscultation anteriorly.

HEART: S1, S2.  Regular rate and rhythm.

ABDOMEN:  Soft, no tenderness.



LABS:

White count 11.1.



DIAGNOSTIC IMPRESSION AND PLAN:

Patient with perforated sigmoid diverticulitis with abdominal abscess, status post

laparotomy and diverting colostomy. Abdominal culture predominantly gram negative

anaerobes.  Patient is covered with Unasyn.  She will continue with IV antibiotics for

about a week or 10 days with recent Midline, to discontinue her central line has been

there for more than 10 days. Continue with supportive care.





MMODL / IJN: [id number] / Job#: [id number]

## 2020-06-25 NOTE — P.PN
<LeoRoula FISHER - Last Filed: 06/25/20 11:41>





Subjective


Progress Note Date: 06/25/20





CHIEF COMPLAINT: Abdominal pain





HISTORY OF PRESENT ILLNESS: 59-year-old female who underwent exploratory 

laparotomy with sigmoid colectomy, descending colostomy creation, and 

appendectomy with Dr. Reed on June 14, 2020. Patient sitting up in the 

chair this morning. Reports abdominal pain is tolerable. Eating small amounts of

PO intake. Ostomy functioning. WBC 13.7 today. Afebrile.





PHYSICAL EXAM: 


VITAL SIGNS: Reviewed


GENERAL: Well-developed in no acute distress


HEENT:  No sclera icterus. Extraocular movements grossly intact.  Moist buccal 

mucosa. 


Head is atraumatic, normocephalic. No nasal drainage.


NECK:  Supple without lymphadenopathy.


CHEST:  Non-labored respirations and equal bilateral excursions


CARDIOVASCULAR:  Regular rate with regular rhythm.  Palpable 2+ radial pulses.


ABDOMEN:  Soft.  Nondistended. Dressing clean dry intact. Ostomy to left side of

abdomen. Stoma pink. TERESSA drain with serous drainage.


MUSCULOSKELETAL:  No clubbing or cyanosis.


NEUROLOGIC:  No focal or lateralizing signs.  Cranial nerves II through XII 

grossly intact.


PSYCH:  Appropriate affect.  Alert and oriented to person, place and time.


SKIN: Well perfused.  Good skin turgor. 





ASSESSMENT: 


1.  Abdominal pain secondary to ruptured sigmoid colon, fecal peritonitis, 

descending and sigmoid colon impaction, appendicolith with appendicitis





PLAN: 


Continue current diet


Discontinue TPN today


Monitor TERESSA drain


Continue antibiotics. Monitor labs


Further ICU management per pulmonary





Nurse practitioner note has been reviewed by physician. Signing provider agrees 

with the documented findings, assessment, and plan of care. 








Objective





- Vital Signs


Vital signs: 


                                   Vital Signs











Temp  98.0 F   06/25/20 08:00


 


Pulse  84   06/25/20 11:00


 


Resp  39 H  06/25/20 11:35


 


BP  94/73   06/25/20 11:00


 


Pulse Ox  96   06/25/20 10:00








                                 Intake & Output











 06/24/20 06/25/20 06/25/20





 18:59 06:59 18:59


 


Intake Total 1100 1300 475


 


Output Total 8396 1735 1270


 


Balance -355 -515 -795


 


Weight 81.7 kg 79 kg 


 


Intake:   


 


  IV 1100 1300 375


 


    Ampicillin-Sulbactam 3 gm 100 200 





    In Sodium Chloride 0.9%   





    100 ml @ 200 mls/hr IVPB   





    Q6HR [facility] Rx#:[id number]   


 


    Mvi, Adult No.4 with Vit 660 660 275





    K 10 ml Trace (Conc-1Ml/   





    Dose) 1 ml Potassium   





    Chloride 60 meq Potassium   





    Phosphate 30 mmol   





    Magnesium Sulfate gm 0.6   





    gm Calcium Gluconate 1 gm   





    In Amino Acid 5%-D15w 1,   





    000 ml @ 55 mls/hr IV .   





    [id number] [facility] Rx#:[id number]   


 


    Sodium Chloride 0.45% 1, 240 240 100





    000 ml @ 20 mls/hr IV .   





    Q24H [facility] Rx#:[id number]   


 


    metroNIDAZOLE-NS  100 200 





    mg   


 


  Oral   100


 


Output:   


 


  Drainage 30 40 


 


    Right Lower Abdomen 30 40 


 


  Urine 1425 1575 670


 


  Stool  200 600


 


Other:   


 


  Voiding Method Indwelling Catheter Indwelling Catheter Indwelling Catheter








                       ABP, PAP, CO, CI - Last Documented











Arterial Blood Pressure        99/52

















- Labs


CBC & Chem 7: 


                                 06/25/20 05:25





                                 06/25/20 05:25


Labs: 


                  Abnormal Lab Results - Last 24 Hours (Table)











  06/18/20 06/25/20 06/25/20 Range/Units





  11:39 00:07 05:25 


 


WBC     (3.8-10.6)  k/uL


 


RBC     (3.80-5.40)  m/uL


 


RDW     (11.5-15.5)  %


 


Neutrophils #     (1.3-7.7)  k/uL


 


Sodium    133 L  (137-145)  mmol/L


 


Creatinine    0.34 L  (0.52-1.04)  mg/dL


 


Glucose    114 H  (74-99)  mg/dL


 


POC Glucose (mg/dL)   100 H   (75-99)  mg/dL


 


Calcium    8.1 L  (8.4-10.2)  mg/dL


 


Selenium  49 L    ()  mcg/L














  06/25/20 Range/Units





  05:25 


 


WBC  13.7 H  (3.8-10.6)  k/uL


 


RBC  3.79 L  (3.80-5.40)  m/uL


 


RDW  17.0 H  (11.5-15.5)  %


 


Neutrophils #  11.6 H  (1.3-7.7)  k/uL


 


Sodium   (137-145)  mmol/L


 


Creatinine   (0.52-1.04)  mg/dL


 


Glucose   (74-99)  mg/dL


 


POC Glucose (mg/dL)   (75-99)  mg/dL


 


Calcium   (8.4-10.2)  mg/dL


 


Selenium   ()  mcg/L








                      Microbiology - Last 24 Hours (Table)











 06/19/20 05:10 Blood Culture - Final





 Blood    No Growth after 144 hours














<BriannaSocorro N - Last Filed: 06/26/20 03:15>





Subjective





Patient seen and evaluated with nurse practitioner.  Moderate stool in ostomy 

appliance including flatus.  Patient is sitting up in chair.  Metabolic 

encephalopathy appears to completely resolve.  Patient expressing wishes for 

rehab at a facility of choice, [facility].  Patient has global physical weakness.  

Will monitor white blood cell count which temporarily elevated secondary to 

temporary discontinuance of antibiotics due to pharmacy protocol for 24-48 

hours.  Antibiotics has been adjusted by infectious disease.  TERESSA otherwise 

continues to be serous.  Overall infection improving.





Objective





- Vital Signs


Vital signs: 


                                   Vital Signs











Temp  98.1 F   06/26/20 00:00


 


Pulse  79   06/26/20 02:00


 


Resp  22   06/26/20 02:00


 


BP  95/74   06/25/20 20:00


 


Pulse Ox  96   06/26/20 02:00








                                 Intake & Output











 06/25/20 06/25/20 06/26/20





 06:59 18:59 06:59


 


Intake Total 1300 2538.2 876


 


Output Total 1815 2300 1300


 


Balance -515 238.2 -424


 


Weight 79 kg  


 


Intake:   


 


  IV 1300 1226 876


 


    Ampicillin-Sulbactam 3 gm 200 100 100





    In Sodium Chloride 0.9%   





    100 ml @ 200 mls/hr IVPB   





    Q6HR [facility] Rx#:[id number]   


 


    Fat Emulsion 20% 250 mL@  126 126





    20.833mls/hr   


 


    Mvi, Adult No.4 with Vit 660 660 





    K 10 ml Trace (Conc-1Ml/   





    Dose) 1 ml Potassium   





    Chloride 60 meq Potassium   





    Phosphate 30 mmol   





    Magnesium Sulfate gm 0.6   





    gm Calcium Gluconate 1 gm   





    In Amino Acid 5%-D15w 1,   





    000 ml @ 55 mls/hr IV .   





    [id number] [facility] Rx#:[id number]   


 


    Mvi, Adult No.4 with Vit   385





    K 10 ml Trace (Conc-1Ml/   





    Dose) 1 ml Potassium   





    Chloride 60 meq Potassium   





    Phosphate 30 mmol   





    Magnesium Sulfate gm 0.6   





    gm Calcium Gluconate 1 gm   





    In Amino Acid 5%-D15w 1,   





    000 ml @ 55 mls/hr IV .   





    [id number] [facility] Rx#:[id number]   


 


    Sodium Chloride 0.45% 1, 240 240 160





    000 ml @ 20 mls/hr IV .   





    Q24H [facility] Rx#:[id number]   


 


    Sodium Chloride 0.9% 1,00   5





    mL   


 


    metroNIDAZOLE-NS  200 100 100





    mg   


 


  Intake, IV Titration  1062.2 





  Amount   


 


    Mvi, Adult No.4 with Vit  1062.2 





    K 10 ml Trace (Conc-1Ml/   





    Dose) 1 ml Potassium   





    Chloride 60 meq Potassium   





    Phosphate 30 mmol   





    Magnesium Sulfate gm 0.6   





    gm Calcium Gluconate 1 gm   





    In Amino Acid 5%-D15w 1,   





    000 ml @ 55 mls/hr IV .   





    [id number] [facility] Rx#:[id number]   


 


  Oral  250 


 


Output:   


 


  Drainage 40  


 


    Right Lower Abdomen 40  


 


  Urine 1575 1450 1200


 


  Stool 200 850 100


 


Other:   


 


  Voiding Method Indwelling Catheter Indwelling Catheter Indwelling Catheter








                       ABP, PAP, CO, CI - Last Documented











Arterial Blood Pressure        89/47

















- Labs


CBC & Chem 7: 


                                 06/25/20 05:25





                                 06/25/20 05:25


Labs: 


                  Abnormal Lab Results - Last 24 Hours (Table)











  06/25/20 06/25/20 06/25/20 Range/Units





  05:25 05:25 17:33 


 


WBC   13.7 H   (3.8-10.6)  k/uL


 


RBC   3.79 L   (3.80-5.40)  m/uL


 


RDW   17.0 H   (11.5-15.5)  %


 


Neutrophils #   11.6 H   (1.3-7.7)  k/uL


 


Sodium  133 L    (137-145)  mmol/L


 


Creatinine  0.34 L    (0.52-1.04)  mg/dL


 


Glucose  114 H    (74-99)  mg/dL


 


POC Glucose (mg/dL)    101 H  (75-99)  mg/dL


 


Calcium  8.1 L    (8.4-10.2)  mg/dL








                      Microbiology - Last 24 Hours (Table)











 06/24/20 13:50 Blood Culture - Preliminary





 Blood    No Growth after 24 hours


 


 06/19/20 05:10 Blood Culture - Final





 Blood    No Growth after 144 hours














Assessment and Plan


(1) Peritonitis (acute) generalized


Current Visit: Yes   Status: Acute   Code(s): K65.0 - GENERALIZED (ACUTE) 

PERITONITIS   SNOMED Code(s): 63167466


   





(2) History of gastric bypass


Current Visit: Yes   Status: Acute   Code(s): Z98.84 - BARIATRIC SURGERY STATUS 

 SNOMED Code(s): 713603896


   





(3) Medical non-compliance


Current Visit: Yes   Status: Acute   Code(s): Z91.19 - PATIENT'S NONCOMPLIANCE W

OTH MEDICAL TREATMENT AND REGIMEN   SNOMED Code(s): 570159167


   





(4) Tobacco abuse


Current Visit: Yes   Status: Acute   Code(s): Z72.0 - TOBACCO USE   SNOMED 

Code(s): 802417567


   





(5) Tobacco abuse counseling


Current Visit: Yes   Status: Acute   Code(s): Z71.6 - TOBACCO ABUSE COUNSELING  

SNOMED Code(s): 168374692


   





(6) Depressive disorder


Current Visit: Yes   Status: Acute   Code(s): F32.9 - MAJOR DEPRESSIVE DISORDER,

SINGLE EPISODE, UNSPECIFIED   SNOMED Code(s): 46629148


   





(7) Hypothyroidism


Current Visit: Yes   Status: Acute   Code(s): E03.9 - HYPOTHYROIDISM, 

UNSPECIFIED   SNOMED Code(s): 22657653


   





(8) Abdominal pain


Current Visit: Yes   Status: Acute   Code(s): R10.9 - UNSPECIFIED ABDOMINAL PAIN

  SNOMED Code(s): 01127696


   





(9) Free intraperitoneal air


Current Visit: Yes   Status: Acute   Code(s): K66.8 - OTHER SPECIFIED DISORDERS 

OF PERITONEUM   SNOMED Code(s): 15190122


   





(10) Dehydration


Current Visit: Yes   Status: Acute   Code(s): E86.0 - DEHYDRATION   SNOMED 

Code(s): 99793477

## 2020-06-25 NOTE — P.PN
Subjective


Progress Note Date: 06/25/20





On today's evaluation of 06/22/2020, I'm seeing this patient for a follow-up in 

the intensive care unit.  The patient remains intubated on a mechanical 

ventilator.  The patient is currently sedated and the patient is receiving 

propofol at a dose of 50 mcg/kg per minute.  The patient is on IV fluids at 0.45

saline at the rate of 20 mL an hour.  The patient is on TPN for nutritional 

support running at 55 mL an hour and the patient is on norepinephrine infusion 

at 8.8 micrograms per minutes.  In terms of vent support, the patient remains on

a mechanical ventilator.  She is an assist-control mode with a rate of 20 with a

tidal volume of 350 and FiO2 of 50% with a PEEP of 10.  Earlier blood gases 

showed a pH of 7.5 with a pCO2 of 43 and pO2 of 100.  Necessity ventilator 

changes were done.  Noted the patient is post perforated sigmoid colon and the 

patient is postop day #8.  She had to be reintubated on 06/19/2020 acute hypoxic

respiratory failure and development of bilateral pulmonary infiltrates.  The 

intra-abdominal cultures are showing anaerobic gram-negative bacillus 2 and 

Clostridium perfringens.  Antibiotic coverage remains IV Unasyn and Flagyl for 

now.  The patient on TPN for nutritional support.  The chest x-ray from today 

shows mild stable left lower lobe pulmonary infiltrate.  There is also small 

right sided pleural effusion.  There is blunting of the right costophrenic 

angle.  Small right-sided pleural effusion developed.  ET tube is in a good 

location for now.





On 06/23/2020 and seeing this patient for a follow-up.  The patient is calm and 

comfortable sedated on a mechanical ventilator.  This morning she is on propofol

at the rate of 50 g per KG per minute.  The patient is also receiving TPN at 

the rate of 55 mL an hour.  She is arousable and the patient is gradually being 

weaned off the sedation as the patient is being given a sedation holiday this 

morning.  She remains on assist control mode at the rate of 20 with a tidal 

volume of 350 and FiO2 of 40% with a PEEP of 5.  Blood gases from today showed a

pH of 7.48 with a pCO2 of 40 and pO2 of 106.  Chest x-ray shows significant 

changes compared to yesterday.  There are small better pleural effusion and 

suspected.  No change in the left basilar opacity.  The patient has a functional

colostomy.  There is some stool extubating in the colostomy bag.  Surgical wound

site is dry clean and intact.  The patient is still on pressors and she is 

requiring norepinephrine infusion rate of 5 mg/m.  She is on TPN at the rate of 

55 mL an hour.  She has adequate urine output.  No other significant events 

overnight.  During the course of my evaluation, I stop the sedation, check 

weaning parameters and following that I give the patient is point is breathing 

trial with a pressure support of 5 and a PEEP of 5.  After being on the setting 

for around 30 minutes, the patient up with a pH of 7.51 with a pCO2 of 36 and p

O2 of 87 and the patient is currently being extubated.





On 06/24/2020 and seeing the patient for a follow-up.  The patient is currently 

extubated and she is calm and comfortable.  The patient is currently on 2 L of 

oxygen by nasal cannula with a pulse is 97%.  The patient will have a swallow 

evaluation did I already checked a bedside swallow and I gave her some clear 

liquid diet.  She is on Diprivan which is running at 55 an hour and she is also 

on half normal saline 23 an hour.  Colostomy site is functioning.  She is weak 

and she has global weakness and maybe motor weakness in both upper and lower 

extremities.  Overall surgical wound site is dry clean and intact.  She has been

producing adequate amount of urine output.  No fever or chills.  Antibiotic 

coverage is still the same and the patient remains on a combination of Unasyn 

and Flagyl.note that the patient off norepinephrine infusion. has been also 

weaned off the pressors and she is currently





On 06/24/2020 the patient is being seen for a follow-up.  Doing well.  No 

specific complaints.  Still having significant amount of motor weakness all 

over.  She was a being given some liquid diet and the oral intake is minimal and

the patient is still being supplemented with TPN for nutritional support.  No 

fever.  No chills.  She is on IV Unasyn.  She is also on Flagyl.  She had 

developed some candidiasis vaginally and she was going to be given Diflucan.  No

fever.  No chills.  Surgical wound site is dry clean and intact.  The colostomy 

site is functional.  There is positive stool output.  No pressors.  Cardiac 

rhythm is sinus.  No hypotension.  No other significant complications.  

Abdominal wound cultures are essentially the same.  She is using incentive 

spirometer.  She is communicating.  She seems to be much better mood and 

spirits. The chest x-ray from today shows an incidental skin fold versus a 

pneumothorax in the right lung and this is going to be monitored very closely.  

The patient is going to have a PICC line inserted.





Objective





- Vital Signs


Vital signs: 


                                   Vital Signs











Temp  97.6 F   06/25/20 12:00


 


Pulse  73   06/25/20 12:00


 


Resp  18   06/25/20 12:00


 


BP  94/73   06/25/20 12:00


 


Pulse Ox  96   06/25/20 10:00








                                 Intake & Output











 06/24/20 06/25/20 06/25/20





 18:59 06:59 18:59


 


Intake Total 1100 1300 550


 


Output Total 1455 1815 1345


 


Balance -355 -515 -795


 


Weight 81.7 kg 79 kg 


 


Intake:   


 


  IV 1100 1300 450


 


    Ampicillin-Sulbactam 3 gm 100 200 





    In Sodium Chloride 0.9%   





    100 ml @ 200 mls/hr IVPB   





    Q6HR ABIGAIL Rx#:[id number]   


 


    Mvi, Adult No.4 with Vit 660 660 330





    K 10 ml Trace (Conc-1Ml/   





    Dose) 1 ml Potassium   





    Chloride 60 meq Potassium   





    Phosphate 30 mmol   





    Magnesium Sulfate gm 0.6   





    gm Calcium Gluconate 1 gm   





    In Amino Acid 5%-D15w 1,   





    000 ml @ 55 mls/hr IV .   





    [id number] ABIGAIL Rx#:[id number]   


 


    Sodium Chloride 0.45% 1, 240 240 120





    000 ml @ 20 mls/hr IV .   





    Q24H ABIGAIL Rx#:[id number]   


 


    metroNIDAZOLE-NS  100 200 





    mg   


 


  Oral   100


 


Output:   


 


  Drainage 30 40 


 


    Right Lower Abdomen 30 40 


 


  Urine 1425 1575 745


 


  Stool  200 600


 


Other:   


 


  Voiding Method Indwelling Catheter Indwelling Catheter Indwelling Catheter








                       ABP, PAP, CO, CI - Last Documented











Arterial Blood Pressure        104/51

















- Exam








GENERAL: Well-developed in no acute distress,  the patient has been extubated 

currently on room air oxygen with a pulse ox being in the 95-96% range


HEENT:  No sclera icterus. Extraocular movements grossly intact.  Moist buccal 

mucosa. 


Head is atraumatic, normocephalic. No nasal drainage.


NECK:  Supple without lymphadenopathy.


CHEST:  Non-labored respirations and equal bilateral excursions


CARDIOVASCULAR:  Tachycardic.  Regular rate with regular rhythm.  Palpable 2+ 

radial pulses.


ABDOMEN:  Soft.  Nondistended. PREVENA wound vac noted. Ostomy to left side of 

abdomen. No stool or gas noted. Stoma pink. TERESSA drain with serous drainage.


MUSCULOSKELETAL:  No clubbing or cyanosis.


NEUROLOGIC: The patient is awake and alert and there is no focal neurological 

deficits.  There is global weakness and the motor functions both in the upper 

and lower extremities.


PSYCH:  Unable to assess secondary to altered mental status


SKIN: Well perfused.  Good skin turgor. 


 





- Labs


CBC & Chem 7: 


                                 06/25/20 05:25





                                 06/25/20 05:25


Labs: 


                  Abnormal Lab Results - Last 24 Hours (Table)











  06/18/20 06/25/20 06/25/20 Range/Units





  11:39 00:07 05:25 


 


WBC     (3.8-10.6)  k/uL


 


RBC     (3.80-5.40)  m/uL


 


RDW     (11.5-15.5)  %


 


Neutrophils #     (1.3-7.7)  k/uL


 


Sodium    133 L  (137-145)  mmol/L


 


Creatinine    0.34 L  (0.52-1.04)  mg/dL


 


Glucose    114 H  (74-99)  mg/dL


 


POC Glucose (mg/dL)   100 H   (75-99)  mg/dL


 


Calcium    8.1 L  (8.4-10.2)  mg/dL


 


Selenium  49 L    ()  mcg/L














  06/25/20 Range/Units





  05:25 


 


WBC  13.7 H  (3.8-10.6)  k/uL


 


RBC  3.79 L  (3.80-5.40)  m/uL


 


RDW  17.0 H  (11.5-15.5)  %


 


Neutrophils #  11.6 H  (1.3-7.7)  k/uL


 


Sodium   (137-145)  mmol/L


 


Creatinine   (0.52-1.04)  mg/dL


 


Glucose   (74-99)  mg/dL


 


POC Glucose (mg/dL)   (75-99)  mg/dL


 


Calcium   (8.4-10.2)  mg/dL


 


Selenium   ()  mcg/L








                      Microbiology - Last 24 Hours (Table)











 06/19/20 05:10 Blood Culture - Final





 Blood    No Growth after 144 hours














Assessment and Plan


Plan: 








1 ruptured sigmoid colon with fecal peritonitis, along with descending and 

sigmoid colon impaction and appendicolith and appendicitis.  The patient 

underwent expose a laparotomy with sigmoid colectomy and descending colostomy cr

eation and appendectomy.  Surgery was done on 06/14/2020.  The patient is 

recovering well from surgery and the patient has some bowel activity and the 

colostomy bag 





2 acute hypoxic respiratory failure and the patient had to be reintubated on 

06/19/2024 hypoxic respiratory failure and ultimately she was extubated on 

06/23/2020 currently she is on room air oxygen and a chest x-ray from today 

showing small bilateral pleural effusions.





3 sepsis with secondary hypotension secondary to above.  The patient is 

recovered from her sepsis and hypotension currently she is normotensive.





4 Hypothyroidism





5 history of smoking





6 which is moderate impairment of LV function addition to segmental wall motion 

abnormalities and some apical ballooning syndrome.  Moderate tricuspid 

regurgitation and mild pulmonary hypertension was also noted.  There is apical 

lateral LV wall motion hypokinesis.  





7 generalized motor weakness, likely debility secondary to prolonged 

hospitalization and surgery.





8.  Right-sided pneumothorax incidental finding of today's chest x-ray, possibly

a skin fold





9 Vaginal candidiasis








Plan





clear liquid diet





Advance diet as tolerated


Inserted PICC line


Repeat chest x-ray around noontime to reevaluate the right-sided pneumothorax


Continue TPN as the patient's oral intake is suboptimal


Involve physical therapy


Continue IV Unasyn and Flagyl


IV fluids to KVO


Keep same antibiotic coverage


Keep the patient ICU for 24 hours


Incentive spirometer


We'll continue to follow

## 2020-06-25 NOTE — XR
EXAMINATION TYPE: XR chest 1V portable

 

DATE OF EXAM: 6/25/2020

 

COMPARISON: 6/25/2020

 

HISTORY: Line placement

 

TECHNIQUE: Single frontal view of the chest is obtained.

 

FINDINGS:  There is a left-sided PICC line with the tip overlying the SVC which appears new. A right-
sided central line stable. Bibasilar consolidation and small right effusion are stable. No sizable pn
eumothorax. No overt failure. Heart size stable.

 

IMPRESSION:  

1. PICC line appears in good position.

2. Stable bilateral lower lobe infiltrate and small effusion.

## 2020-06-25 NOTE — XR
EXAMINATION TYPE: XR chest 1V portable

 

DATE OF EXAM: 6/25/2020

 

CLINICAL HISTORY: Difficulty breathing progress study.  

 

TECHNIQUE: Single AP portable semiupright view of the chest is obtained.

 

COMPARISON: Chest x-ray from one day earlier and older studies.

 

FINDINGS:  Stable right internal jugular central venous catheter. Cardiac silhouette size stable and 
within normal limits with ectatic aortic knob. Persistent left basilar opacity silhouetting left umu
diaphragm. Additional patchy right basilar opacity and small right pleural effusion. Cannot exclude n
ew right-sided pneumothorax. Advise short-term repeat x-ray.

 

IMPRESSION: Left basilar acute infiltrate and/or atelectasis stable or slightly more prominent. Worse
kris right basilar acute infiltrate and/or atelectasis with small right pleural effusion. 

 

Possible new small right apical pneumothorax. Short-term x-ray follow-up in less than 24 hours advise
d. Image marked in PACS.

 

A Yellow level critical message alert has been initiated for Jamie Castaneda DO via the [employer] 36
0 | Critical Results System on 6/25/2020 7:43 AM.  This message alert has been sent to Jamie Castaneda DO via the preferences provided by the clinician for the receipt of Radiology Critical Findings. [facility] [place] ID [id number].

## 2020-06-25 NOTE — PN
PROGRESS NOTE



FOLLOW-UP NOTE:

The patient appears alert, awake, stable hemodynamically and free of any symptoms.



On exam, heart rate is 84 beats per minute. Blood pressure is 94/79, respiratory rate

18.  Chest exam reveals diminished air entry bilaterally. Heart exam reveals first and

second heart sounds.  No gallop.  Examination of extremities did not reveal any edema.



Labs show a hemoglobin of 11.5.  Potassium is 4.4. Creatinine is 0.3.



ASSESSMENT:

1. Takotsubo syndrome.

2. Perforated bowel, status post surgery.



PLAN:

Patient is currently on Toprol, Aldactone and Zestril, which we are going to continue.





MMODL / IJN: [id number] / Job#: [id number]

## 2020-06-25 NOTE — IR
PICC LINE PLACEMENT:

 

HISTORY:  Infection requiring long-term antibiotic therapy

 

PROCEDURE:  Ultrasound guidance of PICC line placement.

 

:

 

COMPLICATIONS:  None

 

ANESTHESIA:  1. 1% Lidocaine locally.

 

FINDINGS/TECHNIQUE:  The procedure was explained to the patient.  The risks, complications, benefits 
and alternatives were discussed and any questions were answered.  Informed consent was obtained.  The
 patient was placed supine on the fluoroscopic table and prepped and draped in the usual sterile fash
ion.  Utilizing a  21 gauge needle and sonographic guidance, access in the left basilic vein was achi
eved and there is placement of a 0.018 guidewire.  The vein is patent.  A 5-F. sheath was placed over
 the guidewire.  The guidewire and dilator were removed and a 5-F. Double lumen PICC line was placed 
through the sheath with the chest x-ray confirming the tip at the level of the SVC.  The sheath was r
emoved, the catheter was flushed and sutured into position.  The patient was stable throughout the pr
ocedure and remained stable upon discharge from the Department of Radiology. 

 

The vein puncture was patent under ultrasound. A gray scale image was obtained to document patency of
 the vein punctured.

 

All elements of the maximal barrier technique were utilized.

 

IMPRESSION: 

1. Successful PICC line placement under ultrasound performed bedside within the ICU.

## 2020-06-26 LAB
ALBUMIN SERPL-MCNC: 2.2 G/DL (ref 3.5–5)
ANION GAP SERPL CALC-SCNC: 3 MMOL/L
BASOPHILS # BLD AUTO: 0.1 K/UL (ref 0–0.2)
BASOPHILS NFR BLD AUTO: 1 %
BUN SERPL-SCNC: 16 MG/DL (ref 7–17)
CALCIUM SPEC-MCNC: 8.3 MG/DL (ref 8.4–10.2)
CHLORIDE SERPL-SCNC: 110 MMOL/L (ref 98–107)
CHOLEST SERPL-MCNC: 126 MG/DL (ref ?–200)
CO2 SERPL-SCNC: 19 MMOL/L (ref 22–30)
EOSINOPHIL # BLD AUTO: 0.1 K/UL (ref 0–0.7)
EOSINOPHIL NFR BLD AUTO: 1 %
ERYTHROCYTE [DISTWIDTH] IN BLOOD BY AUTOMATED COUNT: 3.67 M/UL (ref 3.8–5.4)
ERYTHROCYTE [DISTWIDTH] IN BLOOD: 17.1 % (ref 11.5–15.5)
GLUCOSE BLD-MCNC: 107 MG/DL (ref 75–99)
GLUCOSE BLD-MCNC: 109 MG/DL (ref 75–99)
GLUCOSE BLD-MCNC: 56 MG/DL (ref 75–99)
GLUCOSE BLD-MCNC: 61 MG/DL (ref 75–99)
GLUCOSE BLD-MCNC: 79 MG/DL (ref 75–99)
GLUCOSE BLD-MCNC: 81 MG/DL (ref 75–99)
GLUCOSE SERPL-MCNC: 109 MG/DL (ref 74–99)
HCT VFR BLD AUTO: 35.1 % (ref 34–46)
HDLC SERPL-MCNC: 19 MG/DL (ref 40–60)
HGB BLD-MCNC: 11.1 GM/DL (ref 11.4–16)
LDLC SERPL CALC-MCNC: 79 MG/DL (ref 0–99)
LYMPHOCYTES # SPEC AUTO: 1.5 K/UL (ref 1–4.8)
LYMPHOCYTES NFR SPEC AUTO: 10 %
MAGNESIUM SPEC-SCNC: 2 MG/DL (ref 1.6–2.3)
MCH RBC QN AUTO: 30.2 PG (ref 25–35)
MCHC RBC AUTO-ENTMCNC: 31.6 G/DL (ref 31–37)
MCV RBC AUTO: 95.8 FL (ref 80–100)
MONOCYTES # BLD AUTO: 0.6 K/UL (ref 0–1)
MONOCYTES NFR BLD AUTO: 4 %
NEUTROPHILS # BLD AUTO: 12.4 K/UL (ref 1.3–7.7)
NEUTROPHILS NFR BLD AUTO: 84 %
PLATELET # BLD AUTO: 265 K/UL (ref 150–450)
POTASSIUM SERPL-SCNC: 4.7 MMOL/L (ref 3.5–5.1)
SODIUM SERPL-SCNC: 132 MMOL/L (ref 137–145)
TRIGL SERPL-MCNC: 142 MG/DL (ref ?–150)
TRIGL SERPL-MCNC: 142 MG/DL (ref ?–150)
WBC # BLD AUTO: 14.8 K/UL (ref 3.8–10.6)

## 2020-06-26 RX ADMIN — NICOTINE SCH PATCH: 14 PATCH, EXTENDED RELEASE TRANSDERMAL at 08:42

## 2020-06-26 RX ADMIN — PANTOPRAZOLE SODIUM SCH MG: 40 INJECTION, POWDER, FOR SOLUTION INTRAVENOUS at 08:48

## 2020-06-26 RX ADMIN — HEPARIN SODIUM SCH UNIT: 5000 INJECTION, SOLUTION INTRAVENOUS; SUBCUTANEOUS at 08:47

## 2020-06-26 RX ADMIN — LEUCINE, PHENYLALANINE, LYSINE, METHIONINE, ISOLEUCINE, VALINE, HISTIDINE, THREONINE, TRYPTOPHAN, ALANINE, GLYCINE, ARGININE, PROLINE, SERINE, TYROSINE, DEXTROSE SCH MLS/HR: 365; 280; 290; 200; 300; 290; 240; 210; 90; 1035; 515; 575; 340; 250; 20; 15 INJECTION INTRAVENOUS at 11:59

## 2020-06-26 RX ADMIN — METRONIDAZOLE SCH MLS/HR: 500 INJECTION, SOLUTION INTRAVENOUS at 17:54

## 2020-06-26 RX ADMIN — INSULIN ASPART SCH: 100 INJECTION, SOLUTION INTRAVENOUS; SUBCUTANEOUS at 06:57

## 2020-06-26 RX ADMIN — METRONIDAZOLE SCH MLS/HR: 500 INJECTION, SOLUTION INTRAVENOUS at 11:19

## 2020-06-26 RX ADMIN — INSULIN ASPART SCH: 100 INJECTION, SOLUTION INTRAVENOUS; SUBCUTANEOUS at 17:37

## 2020-06-26 RX ADMIN — AMPICILLIN SODIUM AND SULBACTAM SODIUM SCH MLS/HR: 2; 1 INJECTION, POWDER, FOR SOLUTION INTRAMUSCULAR; INTRAVENOUS at 12:00

## 2020-06-26 RX ADMIN — AMPICILLIN SODIUM AND SULBACTAM SODIUM SCH MLS/HR: 2; 1 INJECTION, POWDER, FOR SOLUTION INTRAMUSCULAR; INTRAVENOUS at 05:18

## 2020-06-26 RX ADMIN — SODIUM FERRIC GLUCONATE COMPLEX SCH MLS/HR: 12.5 INJECTION INTRAVENOUS at 08:42

## 2020-06-26 RX ADMIN — SODIUM CHLORIDE SCH MG: 900 INJECTION, SOLUTION INTRAVENOUS at 10:01

## 2020-06-26 RX ADMIN — AMPICILLIN SODIUM AND SULBACTAM SODIUM SCH MLS/HR: 2; 1 INJECTION, POWDER, FOR SOLUTION INTRAMUSCULAR; INTRAVENOUS at 19:30

## 2020-06-26 RX ADMIN — ACETAMINOPHEN PRN MG: 325 TABLET, FILM COATED ORAL at 10:00

## 2020-06-26 RX ADMIN — ASPIRIN 81 MG CHEWABLE TABLET SCH MG: 81 TABLET CHEWABLE at 08:47

## 2020-06-26 RX ADMIN — METOPROLOL SUCCINATE SCH MG: 25 TABLET, EXTENDED RELEASE ORAL at 08:48

## 2020-06-26 RX ADMIN — SODIUM CHLORIDE SCH MG: 900 INJECTION, SOLUTION INTRAVENOUS at 21:35

## 2020-06-26 RX ADMIN — HEPARIN SODIUM SCH UNIT: 5000 INJECTION, SOLUTION INTRAVENOUS; SUBCUTANEOUS at 21:35

## 2020-06-26 RX ADMIN — SOYBEAN OIL SCH MLS/HR: 20 INJECTION, SOLUTION INTRAVENOUS at 11:19

## 2020-06-26 RX ADMIN — INSULIN ASPART SCH: 100 INJECTION, SOLUTION INTRAVENOUS; SUBCUTANEOUS at 21:25

## 2020-06-26 RX ADMIN — LISINOPRIL SCH MG: 5 TABLET ORAL at 08:47

## 2020-06-26 RX ADMIN — METRONIDAZOLE SCH MLS/HR: 500 INJECTION, SOLUTION INTRAVENOUS at 05:18

## 2020-06-26 RX ADMIN — THERA TABS SCH EACH: TAB at 08:47

## 2020-06-26 RX ADMIN — INSULIN ASPART SCH: 100 INJECTION, SOLUTION INTRAVENOUS; SUBCUTANEOUS at 11:32

## 2020-06-26 RX ADMIN — SPIRONOLACTONE SCH MG: 25 TABLET, FILM COATED ORAL at 08:47

## 2020-06-26 RX ADMIN — SODIUM BICARBONATE SCH: 84 INJECTION, SOLUTION INTRAVENOUS at 17:02

## 2020-06-26 RX ADMIN — ACETAMINOPHEN PRN MG: 325 TABLET, FILM COATED ORAL at 21:34

## 2020-06-26 RX ADMIN — NOREPINEPHRINE BITARTRATE SCH: 1 INJECTION, SOLUTION, CONCENTRATE INTRAVENOUS at 21:25

## 2020-06-26 RX ADMIN — LEVOTHYROXINE SODIUM ANHYDROUS SCH MCG: 100 INJECTION, POWDER, LYOPHILIZED, FOR SOLUTION INTRAVENOUS at 08:48

## 2020-06-26 NOTE — XR
EXAMINATION TYPE: XR chest 1V

 

DATE OF EXAM: 6/26/2020

 

COMPARISON: 6/25/2020

 

HISTORY: Hx CHF, Pneumo. 

 

TECHNIQUE: Single frontal view of the chest is obtained.

 

FINDINGS:  Basilar infiltrates persist with mild progression suggested at the left lung base. Right I
J central venous line has been removed without pneumothorax. Left-sided PICC line is appropriately pl
aced. Cardiomediastinal silhouette is stable.

 

IMPRESSION:  Basilar infiltrates with mild interval progression suggested at the left lung base.

## 2020-06-26 NOTE — PN
PROGRESS NOTE



Anna is a 59-year-old lady who is admitted to hospital with a perforated bowel and

had  nonischemic cardiomyopathy.  This morning she is doing well and is free of

symptoms, more alert, hemodynamically stable.



CURRENT MEDICATIONS:

Include aspirin, Toprol-XL, Aldactone, and Zestril.



On exam, comfortable at rest.  Vital signs are stable.  There is no jugular venous

distention.  Carotid upstroke is diminished.  There is no bruit.  Chest exam reveals

good air entry bilaterally.  Heart exam reveals first and second heart sounds.  No

gallop.  Has a systolic murmur at the apex.  Abdomen is soft.  Exam of extremities did

not reveal any edema.  Peripheral pulses are felt.



LABS:

Show a hemoglobin of 11, platelet count is 265.  Potassium is 4.7.  Creatinine is 0.34.



ASSESSMENT:

1. Takotsubo syndrome.

2. Status post perforated bowel disease.



PLAN:

Patient will continue current medications.  I will check a lipid profile and start the

patient on statin.





MMODL / IJN: [id number] / Job#: [id number]

## 2020-06-26 NOTE — PN
PROGRESS NOTE



DATE OF SERVICE:

06/26/2020



REASON FOR FOLLOWUP:

1. Secondary peritonitis, abdominal abscess from perforated sigmoid diverticulitis.

2. Elevated white count.



INTERVAL HISTORY:

The patient is currently afebrile. She is breathing comfortably.  She has been

transferred out of the ICU.  No chest pain, shortness of breath or cough.  Abdominal

pain is currently controlled.  No nausea or vomiting.



PHYSICAL EXAMINATION:

Blood pressure 187/58 with a pulse of 75, temperature 98.4.  She is 98% on room air.

General description is a middle-aged female up in the bed in no distress.

RESPIRATORY SYSTEM: Unlabored breathing. Clear to auscultation anteriorly.

HEART: S1, S2.  Regular rate and rhythm.

ABDOMEN: Soft. No tenderness.



LABS:

Hemoglobin 11.9, white count _____ 14.8.



DIAGNOSTIC IMPRESSION AND PLAN:

Patient with abdominal abscess from perforated sigmoid diverticulitis, status post

diverting colostomy. Abdominal culture has been predominately anaerobes and

Clostridium, for which the patient is currently with Unasyn.  However, the patient did

have worsening of her white count, possibly related to underlying colon infection, as

the patient is currently _____. We will add Eraxis, as Diflucan cannot be added because

of the other medication the patient is on and interacting with it. Will repeat a CBC

tomorrow.  If any further worsening of the white count or if she spikes any fever, may

benefit from a CT of abdomen and pelvis.





MMODL / IJN: [id number] / Job#: [id number]

## 2020-06-26 NOTE — P.PN
Subjective


Progress Note Date: 06/26/20





On today's evaluation of 06/22/2020, I'm seeing this patient for a follow-up in 

the intensive care unit.  The patient remains intubated on a mechanical 

ventilator.  The patient is currently sedated and the patient is receiving 

propofol at a dose of 50 mcg/kg per minute.  The patient is on IV fluids at 0.45

saline at the rate of 20 mL an hour.  The patient is on TPN for nutritional 

support running at 55 mL an hour and the patient is on norepinephrine infusion 

at 8.8 micrograms per minutes.  In terms of vent support, the patient remains on

a mechanical ventilator.  She is an assist-control mode with a rate of 20 with a

tidal volume of 350 and FiO2 of 50% with a PEEP of 10.  Earlier blood gases 

showed a pH of 7.5 with a pCO2 of 43 and pO2 of 100.  Necessity ventilator 

changes were done.  Noted the patient is post perforated sigmoid colon and the 

patient is postop day #8.  She had to be reintubated on 06/19/2020 acute hypoxic

respiratory failure and development of bilateral pulmonary infiltrates.  The 

intra-abdominal cultures are showing anaerobic gram-negative bacillus 2 and 

Clostridium perfringens.  Antibiotic coverage remains IV Unasyn and Flagyl for 

now.  The patient on TPN for nutritional support.  The chest x-ray from today 

shows mild stable left lower lobe pulmonary infiltrate.  There is also small 

right sided pleural effusion.  There is blunting of the right costophrenic 

angle.  Small right-sided pleural effusion developed.  ET tube is in a good 

location for now.





On 06/23/2020 and seeing this patient for a follow-up.  The patient is calm and 

comfortable sedated on a mechanical ventilator.  This morning she is on propofol

at the rate of 50 g per KG per minute.  The patient is also receiving TPN at 

the rate of 55 mL an hour.  She is arousable and the patient is gradually being 

weaned off the sedation as the patient is being given a sedation holiday this 

morning.  She remains on assist control mode at the rate of 20 with a tidal 

volume of 350 and FiO2 of 40% with a PEEP of 5.  Blood gases from today showed a

pH of 7.48 with a pCO2 of 40 and pO2 of 106.  Chest x-ray shows significant 

changes compared to yesterday.  There are small better pleural effusion and 

suspected.  No change in the left basilar opacity.  The patient has a functional

colostomy.  There is some stool extubating in the colostomy bag.  Surgical wound

site is dry clean and intact.  The patient is still on pressors and she is 

requiring norepinephrine infusion rate of 5 mg/m.  She is on TPN at the rate of 

55 mL an hour.  She has adequate urine output.  No other significant events 

overnight.  During the course of my evaluation, I stop the sedation, check 

weaning parameters and following that I give the patient is point is breathing 

trial with a pressure support of 5 and a PEEP of 5.  After being on the setting 

for around 30 minutes, the patient up with a pH of 7.51 with a pCO2 of 36 and p

O2 of 87 and the patient is currently being extubated.





On 06/24/2020 and seeing the patient for a follow-up.  The patient is currently 

extubated and she is calm and comfortable.  The patient is currently on 2 L of 

oxygen by nasal cannula with a pulse is 97%.  The patient will have a swallow 

evaluation did I already checked a bedside swallow and I gave her some clear 

liquid diet.  She is on Diprivan which is running at 55 an hour and she is also 

on half normal saline 23 an hour.  Colostomy site is functioning.  She is weak 

and she has global weakness and maybe motor weakness in both upper and lower 

extremities.  Overall surgical wound site is dry clean and intact.  She has been

producing adequate amount of urine output.  No fever or chills.  Antibiotic 

coverage is still the same and the patient remains on a combination of Unasyn 

and Flagyl.note that the patient off norepinephrine infusion. has been also 

weaned off the pressors and she is currently





On 06/24/2020 the patient is being seen for a follow-up.  Doing well.  No 

specific complaints.  Still having significant amount of motor weakness all 

over.  She was a being given some liquid diet and the oral intake is minimal and

the patient is still being supplemented with TPN for nutritional support.  No 

fever.  No chills.  She is on IV Unasyn.  She is also on Flagyl.  She had 

developed some candidiasis vaginally and she was going to be given Diflucan.  No

fever.  No chills.  Surgical wound site is dry clean and intact.  The colostomy 

site is functional.  There is positive stool output.  No pressors.  Cardiac 

rhythm is sinus.  No hypotension.  No other significant complications.  

Abdominal wound cultures are essentially the same.  She is using incentive 

spirometer.  She is communicating.  She seems to be much better mood and 

spirits. The chest x-ray from today shows an incidental skin fold versus a 

pneumothorax in the right lung and this is going to be monitored very closely.  

The patient is going to have a PICC line inserted.





On 06/25/2020 and seeing the patient for a follow-up.  Doing overall well and 

she has no specific complaints and there has been no other significant events 

over the past 24 hours.  I have TPN as the patient's oral intake is still low.  

The patient has a right upper extremity PICC line inserted yesterday and there 

is no evidence of any pneumothorax in the right lung.  TPN is still running.  No

new complaints.  She remains on same antibiotic coverage.  The colostomy site is

functional.  Surgical site is dry clean and intact.  TERESSA drains in place.  No 

nausea.  No vomiting.  No diarrhea.  She is on room air oxygen.  Diet is being 

gradually advanced.  She is communicating and she has no specific complaints.





Objective





- Vital Signs


Vital signs: 


                                   Vital Signs











Temp  98.4 F   06/26/20 12:00


 


Pulse  75   06/26/20 12:00


 


Resp  16   06/26/20 12:00


 


BP  87/58   06/26/20 12:00


 


Pulse Ox  98   06/26/20 12:00








                                 Intake & Output











 06/25/20 06/26/20 06/26/20





 18:59 06:59 18:59


 


Intake Total 2538.2 1376 1832.2


 


Output Total 2300 1830 1270


 


Balance 238.2 -454 562.2


 


Weight  79.8 kg 


 


Intake:   


 


  IV 1226 1376 770


 


    Ampicillin-Sulbactam 3 gm 100 200 100





    In Sodium Chloride 0.9%   





    100 ml @ 200 mls/hr IVPB   





    Q6HR [facility] Rx#:[id number]   


 


    Fat Emulsion 20% 250 mL@ 126 126 20





    20.833mls/hr   


 


    Mvi, Adult No.4 with Vit 660  





    K 10 ml Trace (Conc-1Ml/   





    Dose) 1 ml Potassium   





    Chloride 60 meq Potassium   





    Phosphate 30 mmol   





    Magnesium Sulfate gm 0.6   





    gm Calcium Gluconate 1 gm   





    In Amino Acid 5%-D15w 1,   





    000 ml @ 55 mls/hr IV .   





    [id number] [facility] Rx#:[id number]   


 


    Mvi, Adult No.4 with Vit  605 330





    K 10 ml Trace (Conc-1Ml/   





    Dose) 1 ml Potassium   





    Chloride 60 meq Potassium   





    Phosphate 30 mmol   





    Magnesium Sulfate gm 0.6   





    gm Calcium Gluconate 1 gm   





    In Amino Acid 5%-D15w 1,   





    000 ml @ 55 mls/hr IV .   





    [id number] [facility] Rx#:[id number]   


 


    Sodium Chloride 0.45% 1, 240 240 120





    000 ml @ 20 mls/hr IV .   





    Q24H [facility] Rx#:[id number]   


 


    Sodium Chloride 0.9% 1,00  5 





    mL   


 


    Sodium Ferric Gluconat-   100





    Sucrose 125 mg In Sodium   





    Chloride 0.9% 100 ml @   





    100 mls/hr IVPB DAILY [facility]   





    Rx#:[id number]   


 


    metroNIDAZOLE-NS  100 200 100





    mg   


 


  Intake, IV Titration 1062.2  1062.2





  Amount   


 


    Mvi, Adult No.4 with Vit 1062.2  1062.2





    K 10 ml Trace (Conc-1Ml/   





    Dose) 1 ml Potassium   





    Chloride 60 meq Potassium   





    Phosphate 30 mmol   





    Magnesium Sulfate gm 0.6   





    gm Calcium Gluconate 1 gm   





    In Amino Acid 5%-D15w 1,   





    000 ml @ 55 mls/hr IV .   





    [id number] [facility] Rx#:[id number]   


 


  Oral 250  


 


Output:   


 


  Drainage   5


 


    Right Lower Abdomen   5


 


  Urine 1450 1730 1165


 


  Stool 850 100 100


 


Other:   


 


  Voiding Method Indwelling Catheter Indwelling Catheter Indwelling Catheter








                       ABP, PAP, CO, CI - Last Documented











Arterial Blood Pressure        112/50

















- Exam








GENERAL: Well-developed in no acute distress,  the patient has been extubated 

currently on room air oxygen with a pulse ox being in the 95-96% range


HEENT:  No sclera icterus. Extraocular movements grossly intact.  Moist buccal 

mucosa. 


Head is atraumatic, normocephalic. No nasal drainage.


NECK:  Supple without lymphadenopathy.


CHEST:  Non-labored respirations and equal bilateral excursions


CARDIOVASCULAR:  Tachycardic.  Regular rate with regular rhythm.  Palpable 2+ 

radial pulses.


ABDOMEN:  Soft.  Nondistended. PREVENA wound vac noted. Ostomy to left side of 

abdomen. No stool or gas noted. Stoma pink. TERESSA drain with serous drainage.


MUSCULOSKELETAL:  No clubbing or cyanosis.


NEUROLOGIC: The patient is awake and alert and there is no focal neurological 

deficits.  There is global weakness and the motor functions both in the upper 

and lower extremities.


PSYCH:  Unable to assess secondary to altered mental status


SKIN: Well perfused.  Good skin turgor. 


 





- Labs


CBC & Chem 7: 


                                 06/26/20 04:20





                                 06/26/20 04:20


Labs: 


                  Abnormal Lab Results - Last 24 Hours (Table)











  06/25/20 06/26/20 06/26/20 Range/Units





  17:33 04:20 04:20 


 


WBC    14.8 H  (3.8-10.6)  k/uL


 


RBC    3.67 L  (3.80-5.40)  m/uL


 


Hgb    11.1 L  (11.4-16.0)  gm/dL


 


RDW    17.1 H  (11.5-15.5)  %


 


Neutrophils #    12.4 H  (1.3-7.7)  k/uL


 


Sodium   132 L   (137-145)  mmol/L


 


Chloride   110 H   ()  mmol/L


 


Carbon Dioxide   19 L   (22-30)  mmol/L


 


Creatinine   0.34 L   (0.52-1.04)  mg/dL


 


Glucose   109 H   (74-99)  mg/dL


 


POC Glucose (mg/dL)  101 H    (75-99)  mg/dL


 


Calcium   8.3 L   (8.4-10.2)  mg/dL


 


Ionized Calcium Mary   5.4 H   (4.5-5.3)  mg/dL


 


Albumin   2.2 L   (3.5-5.0)  g/dL


 


HDL Cholesterol     (40-60)  mg/dL














  06/26/20 06/26/20 Range/Units





  04:20 06:54 


 


WBC    (3.8-10.6)  k/uL


 


RBC    (3.80-5.40)  m/uL


 


Hgb    (11.4-16.0)  gm/dL


 


RDW    (11.5-15.5)  %


 


Neutrophils #    (1.3-7.7)  k/uL


 


Sodium    (137-145)  mmol/L


 


Chloride    ()  mmol/L


 


Carbon Dioxide    (22-30)  mmol/L


 


Creatinine    (0.52-1.04)  mg/dL


 


Glucose    (74-99)  mg/dL


 


POC Glucose (mg/dL)   109 H  (75-99)  mg/dL


 


Calcium    (8.4-10.2)  mg/dL


 


Ionized Calcium Mary    (4.5-5.3)  mg/dL


 


Albumin    (3.5-5.0)  g/dL


 


HDL Cholesterol  19 L   (40-60)  mg/dL








                      Microbiology - Last 24 Hours (Table)











 06/24/20 13:50 Blood Culture - Preliminary





 Blood    No Growth after 24 hours














Assessment and Plan


Plan: 








1 ruptured sigmoid colon with fecal peritonitis, along with descending and 

sigmoid colon impaction and appendicolith and appendicitis.  The patient 

underwent expose a laparotomy with sigmoid colectomy and descending colostomy 

creation and appendectomy.  Surgery was done on 06/14/2020.  The patient is 

recovering 





2 acute hypoxic respiratory failure and the patient had to be reintubated on 

06/19/2024 hypoxic respiratory failure and ultimately she was extubated on 

06/23/2020 currently she is on room air oxygen  , the chest x-ray continues to 

show some small bilateral pleural effusion lung bases





3 sepsis with secondary hypotension secondary to above , recovered on no 

pressors 





4 Hypothyroidism





5 history of smoking





6 which is moderate impairment of LV function addition to segmental wall motion 

abnormalities and some apical ballooning syndrome.  Moderate tricuspid 

regurgitation and mild pulmonary hypertension was also noted.  There is apical 

lateral LV wall motion hypokinesis.  





7 generalized motor weakness, likely debility secondary to prolonged 

hospitalization and surgery, improving





8.  TPN for nutritional support and the patient has a right upper extremity PICC

line





9 Vaginal candidiasis








Plan





Supplement her diet with ensure 


Advance diet as tolerated


Continue TPN as the patient's oral intake is suboptimal


Involve physical therapy


Continue IV Unasyn and Flagyl


IV fluids to KVO


Keep same antibiotic coverage


Keep the patient ICU for 24 hours


Incentive spirometer


We'll continue to follow


Transfer this patient to a medical surgical floor with remote telemetry.

## 2020-06-26 NOTE — P.PN
Subjective


Progress Note Date: 06/26/20





CHIEF COMPLAINT: Abdominal pain





HISTORY OF PRESENT ILLNESS: 59-year-old female who underwent exploratory 

laparotomy with sigmoid colectomy, descending colostomy creation, and 

appendectomy with Dr. Reed on June 14, 2020. Patient is awake and alert. 

Sitting up in bed. She denies abdominal pain. Eating small amounts of PO intake.

Ostomy functioning. WBC 14.8 today. Afebrile.





PHYSICAL EXAM: 


VITAL SIGNS: Reviewed


GENERAL: Well-developed in no acute distress


HEENT:  No sclera icterus. Extraocular movements grossly intact.  Moist buccal 

mucosa. 


Head is atraumatic, normocephalic. No nasal drainage.


NECK:  Supple without lymphadenopathy.


CHEST:  Non-labored respirations and equal bilateral excursions


CARDIOVASCULAR:  Regular rate with regular rhythm.  Palpable 2+ radial pulses.


ABDOMEN:  Soft.  Nondistended. Dressing clean dry intact. Ostomy to left side of

abdomen. Stoma pink. TERESSA drain with serous drainage.


MUSCULOSKELETAL:  No clubbing or cyanosis.


NEUROLOGIC:  No focal or lateralizing signs.  Cranial nerves II through XII gr

ossly intact.


PSYCH:  Appropriate affect.  Alert and oriented to person, place and time.


SKIN: Well perfused.  Good skin turgor. 





ASSESSMENT: 


1.  Abdominal pain secondary to ruptured sigmoid colon, fecal peritonitis, 

descending and sigmoid colon impaction, appendicolith with appendicitis





PLAN: 


Continue current diet. Encouraged increased PO intake. Add bariatric supplement 

per Dr. Reed


Monitor TERESSA drain


Continue antibiotics per Dr. Melton. Monitor WBC


Further ICU management per pulmonary


Stable for transfer to general medical floor





Nurse practitioner note has been reviewed by physician. Signing provider agrees 

with the documented findings, assessment, and plan of care. 








Objective





- Vital Signs


Vital signs: 


                                   Vital Signs











Temp  97.7 F   06/26/20 08:00


 


Pulse  81   06/26/20 09:00


 


Resp  22   06/26/20 09:00


 


BP  95/74   06/26/20 08:00


 


Pulse Ox  97   06/26/20 09:00








                                 Intake & Output











 06/25/20 06/26/20 06/26/20





 18:59 06:59 18:59


 


Intake Total 2538.2 1376 325


 


Output Total 2300 1830 585


 


Balance 238.2 -454 -260


 


Weight  79.8 kg 


 


Intake:   


 


  IV 1226 1376 325


 


    Ampicillin-Sulbactam 3 gm 100 200 





    In Sodium Chloride 0.9%   





    100 ml @ 200 mls/hr IVPB   





    Q6HR [facility] Rx#:[id number]   


 


    Fat Emulsion 20% 250 mL@ 126 126 





    20.833mls/hr   


 


    Mvi, Adult No.4 with Vit 660  





    K 10 ml Trace (Conc-1Ml/   





    Dose) 1 ml Potassium   





    Chloride 60 meq Potassium   





    Phosphate 30 mmol   





    Magnesium Sulfate gm 0.6   





    gm Calcium Gluconate 1 gm   





    In Amino Acid 5%-D15w 1,   





    000 ml @ 55 mls/hr IV .   





    [id number] [facility] Rx#:[id number]   


 


    Mvi, Adult No.4 with Vit  605 165





    K 10 ml Trace (Conc-1Ml/   





    Dose) 1 ml Potassium   





    Chloride 60 meq Potassium   





    Phosphate 30 mmol   





    Magnesium Sulfate gm 0.6   





    gm Calcium Gluconate 1 gm   





    In Amino Acid 5%-D15w 1,   





    000 ml @ 55 mls/hr IV .   





    [id number] [facility] Rx#:[id number]   


 


    Sodium Chloride 0.45% 1, 240 240 60





    000 ml @ 20 mls/hr IV .   





    Q24H [facility] Rx#:[id number]   


 


    Sodium Chloride 0.9% 1,00  5 





    mL   


 


    Sodium Ferric Gluconat-   100





    Sucrose 125 mg In Sodium   





    Chloride 0.9% 100 ml @   





    100 mls/hr IVPB DAILY [facility]   





    Rx#:[id number]   


 


    metroNIDAZOLE-NS  100 200 





    mg   


 


  Intake, IV Titration 1062.2  





  Amount   


 


    Mvi, Adult No.4 with Vit 1062.2  





    K 10 ml Trace (Conc-1Ml/   





    Dose) 1 ml Potassium   





    Chloride 60 meq Potassium   





    Phosphate 30 mmol   





    Magnesium Sulfate gm 0.6   





    gm Calcium Gluconate 1 gm   





    In Amino Acid 5%-D15w 1,   





    000 ml @ 55 mls/hr IV .   





    [id number] [facility] Rx#:[id number]   


 


  Oral 250  


 


Output:   


 


  Drainage   5


 


    Right Lower Abdomen   5


 


  Urine 1450 1730 480


 


  Stool 850 100 100


 


Other:   


 


  Voiding Method Indwelling Catheter Indwelling Catheter 








                       ABP, PAP, CO, CI - Last Documented











Arterial Blood Pressure        114/48

















- Labs


CBC & Chem 7: 


                                 06/26/20 04:20





                                 06/26/20 04:20


Labs: 


                  Abnormal Lab Results - Last 24 Hours (Table)











  06/25/20 06/26/20 06/26/20 Range/Units





  17:33 04:20 04:20 


 


WBC    14.8 H  (3.8-10.6)  k/uL


 


RBC    3.67 L  (3.80-5.40)  m/uL


 


Hgb    11.1 L  (11.4-16.0)  gm/dL


 


RDW    17.1 H  (11.5-15.5)  %


 


Neutrophils #    12.4 H  (1.3-7.7)  k/uL


 


Sodium   132 L   (137-145)  mmol/L


 


Chloride   110 H   ()  mmol/L


 


Carbon Dioxide   19 L   (22-30)  mmol/L


 


Creatinine   0.34 L   (0.52-1.04)  mg/dL


 


Glucose   109 H   (74-99)  mg/dL


 


POC Glucose (mg/dL)  101 H    (75-99)  mg/dL


 


Calcium   8.3 L   (8.4-10.2)  mg/dL


 


Ionized Calcium Mary   5.4 H   (4.5-5.3)  mg/dL


 


Albumin   2.2 L   (3.5-5.0)  g/dL


 


HDL Cholesterol     (40-60)  mg/dL














  06/26/20 06/26/20 Range/Units





  04:20 06:54 


 


WBC    (3.8-10.6)  k/uL


 


RBC    (3.80-5.40)  m/uL


 


Hgb    (11.4-16.0)  gm/dL


 


RDW    (11.5-15.5)  %


 


Neutrophils #    (1.3-7.7)  k/uL


 


Sodium    (137-145)  mmol/L


 


Chloride    ()  mmol/L


 


Carbon Dioxide    (22-30)  mmol/L


 


Creatinine    (0.52-1.04)  mg/dL


 


Glucose    (74-99)  mg/dL


 


POC Glucose (mg/dL)   109 H  (75-99)  mg/dL


 


Calcium    (8.4-10.2)  mg/dL


 


Ionized Calcium Mary    (4.5-5.3)  mg/dL


 


Albumin    (3.5-5.0)  g/dL


 


HDL Cholesterol  19 L   (40-60)  mg/dL








                      Microbiology - Last 24 Hours (Table)











 06/24/20 13:50 Blood Culture - Preliminary





 Blood    No Growth after 24 hours


 


 06/19/20 05:10 Blood Culture - Final





 Blood    No Growth after 144 hours

## 2020-06-27 LAB
ALBUMIN SERPL-MCNC: 2.5 G/DL (ref 3.5–5)
ALP SERPL-CCNC: 79 U/L (ref 38–126)
ALT SERPL-CCNC: 24 U/L (ref 4–34)
ANION GAP SERPL CALC-SCNC: 6 MMOL/L
AST SERPL-CCNC: 40 U/L (ref 14–36)
BASOPHILS # BLD AUTO: 0.1 K/UL (ref 0–0.2)
BASOPHILS NFR BLD AUTO: 0 %
BUN SERPL-SCNC: 14 MG/DL (ref 7–17)
CALCIUM SPEC-MCNC: 8.9 MG/DL (ref 8.4–10.2)
CHLORIDE SERPL-SCNC: 109 MMOL/L (ref 98–107)
CO2 SERPL-SCNC: 21 MMOL/L (ref 22–30)
EOSINOPHIL # BLD AUTO: 0.2 K/UL (ref 0–0.7)
EOSINOPHIL NFR BLD AUTO: 2 %
ERYTHROCYTE [DISTWIDTH] IN BLOOD BY AUTOMATED COUNT: 3.29 M/UL (ref 3.8–5.4)
ERYTHROCYTE [DISTWIDTH] IN BLOOD: 17.5 % (ref 11.5–15.5)
GLUCOSE BLD-MCNC: 110 MG/DL (ref 75–99)
GLUCOSE BLD-MCNC: 110 MG/DL (ref 75–99)
GLUCOSE BLD-MCNC: 91 MG/DL (ref 75–99)
GLUCOSE BLD-MCNC: 95 MG/DL (ref 75–99)
GLUCOSE BLD-MCNC: 98 MG/DL (ref 75–99)
GLUCOSE SERPL-MCNC: 99 MG/DL (ref 74–99)
HCT VFR BLD AUTO: 34.3 % (ref 34–46)
HGB BLD-MCNC: 10.4 GM/DL (ref 11.4–16)
LYMPHOCYTES # SPEC AUTO: 1.2 K/UL (ref 1–4.8)
LYMPHOCYTES NFR SPEC AUTO: 10 %
MAGNESIUM SPEC-SCNC: 2 MG/DL (ref 1.6–2.3)
MCH RBC QN AUTO: 31.7 PG (ref 25–35)
MCHC RBC AUTO-ENTMCNC: 30.4 G/DL (ref 31–37)
MCV RBC AUTO: 104.1 FL (ref 80–100)
MONOCYTES # BLD AUTO: 0.4 K/UL (ref 0–1)
MONOCYTES NFR BLD AUTO: 3 %
NEUTROPHILS # BLD AUTO: 9.8 K/UL (ref 1.3–7.7)
NEUTROPHILS NFR BLD AUTO: 83 %
PLATELET # BLD AUTO: 292 K/UL (ref 150–450)
POTASSIUM SERPL-SCNC: 4.9 MMOL/L (ref 3.5–5.1)
PROT SERPL-MCNC: 5.3 G/DL (ref 6.3–8.2)
SODIUM SERPL-SCNC: 136 MMOL/L (ref 137–145)
WBC # BLD AUTO: 11.8 K/UL (ref 3.8–10.6)

## 2020-06-27 RX ADMIN — ACETAMINOPHEN PRN MG: 325 TABLET, FILM COATED ORAL at 20:22

## 2020-06-27 RX ADMIN — AMPICILLIN SODIUM AND SULBACTAM SODIUM SCH MLS/HR: 2; 1 INJECTION, POWDER, FOR SOLUTION INTRAMUSCULAR; INTRAVENOUS at 00:21

## 2020-06-27 RX ADMIN — NOREPINEPHRINE BITARTRATE SCH: 1 INJECTION, SOLUTION, CONCENTRATE INTRAVENOUS at 21:53

## 2020-06-27 RX ADMIN — METRONIDAZOLE SCH MLS/HR: 500 INJECTION, SOLUTION INTRAVENOUS at 18:10

## 2020-06-27 RX ADMIN — LEUCINE, PHENYLALANINE, LYSINE, METHIONINE, ISOLEUCINE, VALINE, HISTIDINE, THREONINE, TRYPTOPHAN, ALANINE, GLYCINE, ARGININE, PROLINE, SERINE, TYROSINE, DEXTROSE SCH MLS/HR: 365; 280; 290; 200; 300; 290; 240; 210; 90; 1035; 515; 575; 340; 250; 20; 15 INJECTION INTRAVENOUS at 05:29

## 2020-06-27 RX ADMIN — METRONIDAZOLE SCH MLS/HR: 500 INJECTION, SOLUTION INTRAVENOUS at 06:40

## 2020-06-27 RX ADMIN — INSULIN ASPART SCH: 100 INJECTION, SOLUTION INTRAVENOUS; SUBCUTANEOUS at 20:23

## 2020-06-27 RX ADMIN — HEPARIN SODIUM SCH UNIT: 5000 INJECTION, SOLUTION INTRAVENOUS; SUBCUTANEOUS at 09:18

## 2020-06-27 RX ADMIN — ANIDULAFUNGIN SCH MLS/HR: 100 INJECTION, POWDER, LYOPHILIZED, FOR SOLUTION INTRAVENOUS at 10:28

## 2020-06-27 RX ADMIN — SODIUM FERRIC GLUCONATE COMPLEX SCH MLS/HR: 12.5 INJECTION INTRAVENOUS at 09:17

## 2020-06-27 RX ADMIN — METRONIDAZOLE SCH MLS/HR: 500 INJECTION, SOLUTION INTRAVENOUS at 15:31

## 2020-06-27 RX ADMIN — INSULIN ASPART SCH: 100 INJECTION, SOLUTION INTRAVENOUS; SUBCUTANEOUS at 17:21

## 2020-06-27 RX ADMIN — SODIUM CHLORIDE SCH MG: 900 INJECTION, SOLUTION INTRAVENOUS at 20:22

## 2020-06-27 RX ADMIN — PANTOPRAZOLE SODIUM SCH MG: 40 INJECTION, POWDER, FOR SOLUTION INTRAVENOUS at 09:18

## 2020-06-27 RX ADMIN — NICOTINE SCH PATCH: 14 PATCH, EXTENDED RELEASE TRANSDERMAL at 09:17

## 2020-06-27 RX ADMIN — METOPROLOL SUCCINATE SCH MG: 25 TABLET, EXTENDED RELEASE ORAL at 09:18

## 2020-06-27 RX ADMIN — ASPIRIN 81 MG CHEWABLE TABLET SCH MG: 81 TABLET CHEWABLE at 09:18

## 2020-06-27 RX ADMIN — INSULIN ASPART SCH: 100 INJECTION, SOLUTION INTRAVENOUS; SUBCUTANEOUS at 09:17

## 2020-06-27 RX ADMIN — SODIUM CHLORIDE SCH MG: 900 INJECTION, SOLUTION INTRAVENOUS at 09:19

## 2020-06-27 RX ADMIN — ACETAMINOPHEN PRN MG: 325 TABLET, FILM COATED ORAL at 15:31

## 2020-06-27 RX ADMIN — METRONIDAZOLE SCH MLS/HR: 500 INJECTION, SOLUTION INTRAVENOUS at 01:13

## 2020-06-27 RX ADMIN — ACETAMINOPHEN PRN MG: 325 TABLET, FILM COATED ORAL at 05:48

## 2020-06-27 RX ADMIN — LISINOPRIL SCH MG: 5 TABLET ORAL at 09:18

## 2020-06-27 RX ADMIN — LEVOTHYROXINE SODIUM ANHYDROUS SCH MCG: 100 INJECTION, POWDER, LYOPHILIZED, FOR SOLUTION INTRAVENOUS at 09:19

## 2020-06-27 RX ADMIN — THERA TABS SCH EACH: TAB at 09:18

## 2020-06-27 RX ADMIN — HEPARIN SODIUM SCH UNIT: 5000 INJECTION, SOLUTION INTRAVENOUS; SUBCUTANEOUS at 20:22

## 2020-06-27 RX ADMIN — AMPICILLIN SODIUM AND SULBACTAM SODIUM SCH MLS/HR: 2; 1 INJECTION, POWDER, FOR SOLUTION INTRAMUSCULAR; INTRAVENOUS at 05:29

## 2020-06-27 RX ADMIN — AMPICILLIN SODIUM AND SULBACTAM SODIUM SCH MLS/HR: 2; 1 INJECTION, POWDER, FOR SOLUTION INTRAMUSCULAR; INTRAVENOUS at 17:52

## 2020-06-27 RX ADMIN — AMPICILLIN SODIUM AND SULBACTAM SODIUM SCH MLS/HR: 2; 1 INJECTION, POWDER, FOR SOLUTION INTRAMUSCULAR; INTRAVENOUS at 14:57

## 2020-06-27 RX ADMIN — SPIRONOLACTONE SCH MG: 25 TABLET, FILM COATED ORAL at 09:18

## 2020-06-27 RX ADMIN — HYDROCODONE BITARTRATE AND ACETAMINOPHEN PRN EACH: 5; 325 TABLET ORAL at 21:52

## 2020-06-27 RX ADMIN — SODIUM BICARBONATE SCH: 84 INJECTION, SOLUTION INTRAVENOUS at 16:51

## 2020-06-27 RX ADMIN — SOYBEAN OIL SCH MLS/HR: 20 INJECTION, SOLUTION INTRAVENOUS at 12:18

## 2020-06-27 RX ADMIN — INSULIN ASPART SCH: 100 INJECTION, SOLUTION INTRAVENOUS; SUBCUTANEOUS at 14:53

## 2020-06-27 RX ADMIN — ACETAMINOPHEN PRN MG: 325 TABLET, FILM COATED ORAL at 09:35

## 2020-06-27 NOTE — P.PN
Progress Note - Text


Progress Note Date: 06/27/20





Patient resting comfortably red.  She is of limited oral intake.





On exam vessels are stable.  Abdomen soft.  Colostomy has some output in the 

bag.





Status post sigmoid colectomy with colostomy.  Patient to continue receive 

supportive care.

## 2020-06-27 NOTE — P.PN
Subjective


Progress Note Date: 06/27/20


Principal diagnosis: 





Ruptured sigmoid colon with fecal peritonitis, status post sigmoid colectomy and

descending colostomy creation and appendectomy





The patient is seen today 06/27/2020 in follow-up on the regular medical floor. 

She is currently resting quite comfortably in bed.  Awake and alert oriented.  

Maintaining O2 saturations in the 90s on room air.  Tolerating a high-protein 

consistent carb diet.  Ostomy functioning.  Abdominal dressing is dry and 

intact.  TERESSA drain remains in place.  Blood cultures reveal no growth.  Sputum 

culture revealed no growth.  Initial wound cultures were positive for 

Clostridium perfringens.  She remains on Unasyn, anidulafungin, metronidazole, 

supplemental PPN.  White count 11.8.  Hemoglobin 10.4.  Sodium 136.  Potassium 

4.9.  Creatinine 0.41.





Objective





- Vital Signs


Vital signs: 


                                   Vital Signs











Temp  98.1 F   06/27/20 05:00


 


Pulse  79   06/27/20 05:00


 


Resp  18   06/27/20 05:00


 


BP  96/62   06/27/20 05:00


 


Pulse Ox  97   06/27/20 05:00








                                 Intake & Output











 06/26/20 06/27/20 06/27/20





 18:59 06:59 18:59


 


Intake Total 2432.2 962.5 


 


Output Total 1745  


 


Balance 687.2 962.5 


 


Weight 79.8 kg  


 


Intake:   


 


    


 


    Ampicillin-Sulbactam 3 gm 100  





    In Sodium Chloride 0.9%   





    100 ml @ 200 mls/hr IVPB   





    [facility] Rx#:[id number]   


 


    Fat Emulsion 20% 250 mL@ 20  





    20.833mls/hr   


 


    Mvi, Adult No.4 with Vit 330  





    K 10 ml Trace (Conc-1Ml/   





    Dose) 1 ml Potassium   





    Chloride 60 meq Potassium   





    Phosphate 30 mmol   





    Magnesium Sulfate gm 0.6   





    gm Calcium Gluconate 1 gm   





    In Amino Acid 5%-D15w 1,   





    000 ml @ 55 mls/hr IV .   





    [id number] [facility] Rx#:[id number]   


 


    Sodium Chloride 0.45% 1, 120  





    000 ml @ 20 mls/hr IV .   





    Q24H ABIGAIL Rx#:[id number]   


 


    Sodium Ferric Gluconat- 100  





    Sucrose 125 mg In Sodium   





    Chloride 0.9% 100 ml @   





    100 mls/hr IVPB DAILY [facility]   





    Rx#:[id number]   


 


    metroNIDAZOLE-NS  100  





    mg   


 


  Intake, IV Titration 1062.2 962.5 





  Amount   


 


    Mvi, Adult No.4 with Vit 1062.2 962.5 





    K 10 ml Trace (Conc-1Ml/   





    Dose) 1 ml Potassium   





    Chloride 60 meq Potassium   





    Phosphate 30 mmol   





    Magnesium Sulfate gm 0.6   





    gm Calcium Gluconate 1 gm   





    In Amino Acid 5%-D15w 1,   





    000 ml @ 55 mls/hr IV .   





    [id number] [facility] Rx#:[id number]   


 


  Oral 600  


 


Output:   


 


  Drainage 5  


 


    Right Lower Abdomen 5  


 


  Urine 1640  


 


  Stool 100  


 


Other:   


 


  Voiding Method Indwelling Catheter Bedpan 








                       ABP, PAP, CO, CI - Last Documented











Arterial Blood Pressure        112/50

















- Exam





GENERAL: Pleasant 59-year-old female patient, alert and oriented.  Well-d

eveloped in no acute distress, on room air.


HEENT:  No sclera icterus. Extraocular movements grossly intact.  Moist buccal 

mucosa. 


Head is atraumatic, normocephalic. No nasal drainage.


NECK:  Supple without lymphadenopathy.


CHEST:  Non-labored respirations with faint crackles in the bases left greater 

than right


CARDIOVASCULAR:  Regular rate with regular rhythm.  Palpable 2+ radial pulses.


ABDOMEN:  Soft.  Nondistended. Ostomy to left side of abdomen.  Stool noted. 

Stoma pink. TERESSA drain with serous drainage.


MUSCULOSKELETAL:  No clubbing or cyanosis.


NEUROLOGIC: The patient is awake and alert and there is no focal neurological 

deficits.  There is global weakness and the motor functions both in the upper 

and lower extremities.


PSYCH: Denies anxiety/depression


SKIN: Well perfused.  Good skin turgor.





- Labs


CBC & Chem 7: 


                                 06/27/20 06:38





                                 06/27/20 08:38


Labs: 


                  Abnormal Lab Results - Last 24 Hours (Table)











  06/26/20 06/26/20 06/26/20 Range/Units





  19:59 20:24 20:26 


 


WBC     (3.8-10.6)  k/uL


 


RBC     (3.80-5.40)  m/uL


 


Hgb     (11.4-16.0)  gm/dL


 


MCV     (80.0-100.0)  fL


 


MCHC     (31.0-37.0)  g/dL


 


RDW     (11.5-15.5)  %


 


Neutrophils #     (1.3-7.7)  k/uL


 


Sodium     (137-145)  mmol/L


 


Chloride     ()  mmol/L


 


Carbon Dioxide     (22-30)  mmol/L


 


Creatinine     (0.52-1.04)  mg/dL


 


POC Glucose (mg/dL)  61 L  47 L  56 L  (75-99)  mg/dL


 


Ionized Calcium Mary     (4.5-5.3)  mg/dL


 


AST     (14-36)  U/L


 


C-Reactive Protein     (<10.0)  mg/L


 


Total Protein     (6.3-8.2)  g/dL


 


Albumin     (3.5-5.0)  g/dL














  06/26/20 06/27/20 06/27/20 Range/Units





  20:47 01:47 06:38 


 


WBC    11.8 H  (3.8-10.6)  k/uL


 


RBC    3.29 L  (3.80-5.40)  m/uL


 


Hgb    10.4 L  (11.4-16.0)  gm/dL


 


MCV    104.1 H D  (80.0-100.0)  fL


 


MCHC    30.4 L  (31.0-37.0)  g/dL


 


RDW    17.5 H  (11.5-15.5)  %


 


Neutrophils #    9.8 H  (1.3-7.7)  k/uL


 


Sodium     (137-145)  mmol/L


 


Chloride     ()  mmol/L


 


Carbon Dioxide     (22-30)  mmol/L


 


Creatinine     (0.52-1.04)  mg/dL


 


POC Glucose (mg/dL)  107 H  110 H   (75-99)  mg/dL


 


Ionized Calcium Mary     (4.5-5.3)  mg/dL


 


AST     (14-36)  U/L


 


C-Reactive Protein     (<10.0)  mg/L


 


Total Protein     (6.3-8.2)  g/dL


 


Albumin     (3.5-5.0)  g/dL














  06/27/20 Range/Units





  08:38 


 


WBC   (3.8-10.6)  k/uL


 


RBC   (3.80-5.40)  m/uL


 


Hgb   (11.4-16.0)  gm/dL


 


MCV   (80.0-100.0)  fL


 


MCHC   (31.0-37.0)  g/dL


 


RDW   (11.5-15.5)  %


 


Neutrophils #   (1.3-7.7)  k/uL


 


Sodium  136 L  (137-145)  mmol/L


 


Chloride  109 H  ()  mmol/L


 


Carbon Dioxide  21 L  (22-30)  mmol/L


 


Creatinine  0.41 L  (0.52-1.04)  mg/dL


 


POC Glucose (mg/dL)   (75-99)  mg/dL


 


Ionized Calcium Mary  5.5 H  (4.5-5.3)  mg/dL


 


AST  40 H  (14-36)  U/L


 


C-Reactive Protein  51.1 H  (<10.0)  mg/L


 


Total Protein  5.3 L  (6.3-8.2)  g/dL


 


Albumin  2.5 L  (3.5-5.0)  g/dL








                      Microbiology - Last 24 Hours (Table)











 06/24/20 13:50 Blood Culture - Preliminary





 Blood    No Growth after 48 hours














Assessment and Plan


Assessment: 








1 ruptured sigmoid colon with fecal peritonitis, along with descending and 

sigmoid colon impaction and appendicolith and appendicitis.  The patient 

underwent expose a laparotomy with sigmoid colectomy and descending colostomy 

creation and appendectomy.  Surgery was done on 06/14/2020.  The patient is 

recovering, ostomy functioning.  Tolerating a diet.





2 acute hypoxic respiratory failure and the patient had to be reintubated on 

06/19/2024 hypoxic respiratory failure and ultimately she was extubated on 

06/23/2020 currently she is on room air oxygen 





3 sepsis with secondary hypotension secondary to above , recovered on no 

pressors 





4 Hypothyroidism





5 history of smoking





6 which is moderate impairment of LV function addition to segmental wall motion 

abnormalities and some apical ballooning syndrome.  Moderate tricuspid 

regurgitation and mild pulmonary hypertension was also noted.  There is apical 

lateral LV wall motion hypokinesis.  





7 generalized motor weakness, likely debility secondary to prolonged 

hospitalization and surgery, improving





8.  TPN for nutritional support and the patient has a right upper extremity PICC

line





9 Vaginal candidiasis








Plan





The patient was seen and evaluated by Dr. Aviles


She is stable from the pulmonary and critical care standpoint


Tolerating diet


Remains on Unasyn, Flagyl


Continues to work with the incentive spirometer


We will see the patient on as-needed basis





I, the cosigning physician, performed a history & physical examination of the 

patient. Lungs sounds faint crackles in the bases left greater than right.  

Maintaining good O2 saturations in the 90s on room air.  I discussed the 

assessment and plan of care with my nurse practitioner, Nereyda Molina. I attest to 

the above note as dictated by her.

## 2020-06-27 NOTE — PN
PROGRESS NOTE



Anna is a 59-year-old lady that is admitted to hospital with perforated bowel and

had takotsubo syndrome.  She is feeling much better.  Denies chest pain or difficulty

in breathing.  She needs to go to rehab.  Blood pressures are in the 90s to 100s

systolic.



EXAM:

Comfortable at rest.  Vital signs are stable.  Chest is clear to auscultation and

percussion.  Heart exam reveals first and second heart sounds.  No gallop.  Exam of

extremities did not reveal any edema.  Peripheral pulses are felt.



LABS:

Show a hemoglobin of 10.4, potassium is 4.9, creatinine is 0.4.



ASSESSMENT:

1. Perforated bowel.

2. Takotsubo syndrome.



PLAN:

I will continue the aspirin, Toprol-XL, Zestril, and the metoprolol that he is

currently on.





MMBRANDONL / BUCKN: [id number] / Job#: [id number]

## 2020-06-28 LAB
ANION GAP SERPL CALC-SCNC: 5 MMOL/L
BASOPHILS # BLD AUTO: 0.1 K/UL (ref 0–0.2)
BASOPHILS NFR BLD AUTO: 1 %
BUN SERPL-SCNC: 14 MG/DL (ref 7–17)
CALCIUM SPEC-MCNC: 8.7 MG/DL (ref 8.4–10.2)
CHLORIDE SERPL-SCNC: 109 MMOL/L (ref 98–107)
CO2 SERPL-SCNC: 22 MMOL/L (ref 22–30)
EOSINOPHIL # BLD AUTO: 0.2 K/UL (ref 0–0.7)
EOSINOPHIL NFR BLD AUTO: 2 %
ERYTHROCYTE [DISTWIDTH] IN BLOOD BY AUTOMATED COUNT: 3.5 M/UL (ref 3.8–5.4)
ERYTHROCYTE [DISTWIDTH] IN BLOOD: 17.1 % (ref 11.5–15.5)
GLUCOSE BLD-MCNC: 109 MG/DL (ref 75–99)
GLUCOSE BLD-MCNC: 113 MG/DL (ref 75–99)
GLUCOSE BLD-MCNC: 117 MG/DL (ref 75–99)
GLUCOSE BLD-MCNC: 90 MG/DL (ref 75–99)
GLUCOSE SERPL-MCNC: 100 MG/DL (ref 74–99)
HCT VFR BLD AUTO: 34.4 % (ref 34–46)
HGB BLD-MCNC: 11.1 GM/DL (ref 11.4–16)
LYMPHOCYTES # SPEC AUTO: 1.4 K/UL (ref 1–4.8)
LYMPHOCYTES NFR SPEC AUTO: 12 %
MAGNESIUM SPEC-SCNC: 2 MG/DL (ref 1.6–2.3)
MCH RBC QN AUTO: 31.8 PG (ref 25–35)
MCHC RBC AUTO-ENTMCNC: 32.4 G/DL (ref 31–37)
MCV RBC AUTO: 98.1 FL (ref 80–100)
MONOCYTES # BLD AUTO: 0.6 K/UL (ref 0–1)
MONOCYTES NFR BLD AUTO: 5 %
NEUTROPHILS # BLD AUTO: 9.8 K/UL (ref 1.3–7.7)
NEUTROPHILS NFR BLD AUTO: 80 %
PLATELET # BLD AUTO: 407 K/UL (ref 150–450)
POTASSIUM SERPL-SCNC: 5.4 MMOL/L (ref 3.5–5.1)
SODIUM SERPL-SCNC: 136 MMOL/L (ref 137–145)
WBC # BLD AUTO: 12.2 K/UL (ref 3.8–10.6)

## 2020-06-28 RX ADMIN — ACETAMINOPHEN PRN MG: 325 TABLET, FILM COATED ORAL at 05:00

## 2020-06-28 RX ADMIN — AMPICILLIN SODIUM AND SULBACTAM SODIUM SCH MLS/HR: 2; 1 INJECTION, POWDER, FOR SOLUTION INTRAMUSCULAR; INTRAVENOUS at 23:46

## 2020-06-28 RX ADMIN — NICOTINE SCH PATCH: 14 PATCH, EXTENDED RELEASE TRANSDERMAL at 09:45

## 2020-06-28 RX ADMIN — ACETAMINOPHEN PRN MG: 325 TABLET, FILM COATED ORAL at 09:47

## 2020-06-28 RX ADMIN — THERA TABS SCH EACH: TAB at 09:45

## 2020-06-28 RX ADMIN — AMPICILLIN SODIUM AND SULBACTAM SODIUM SCH MLS/HR: 2; 1 INJECTION, POWDER, FOR SOLUTION INTRAMUSCULAR; INTRAVENOUS at 00:21

## 2020-06-28 RX ADMIN — AMPICILLIN SODIUM AND SULBACTAM SODIUM SCH MLS/HR: 2; 1 INJECTION, POWDER, FOR SOLUTION INTRAMUSCULAR; INTRAVENOUS at 17:58

## 2020-06-28 RX ADMIN — METRONIDAZOLE SCH MLS/HR: 500 INJECTION, SOLUTION INTRAVENOUS at 19:17

## 2020-06-28 RX ADMIN — SODIUM FERRIC GLUCONATE COMPLEX SCH MLS/HR: 12.5 INJECTION INTRAVENOUS at 11:44

## 2020-06-28 RX ADMIN — LEUCINE, PHENYLALANINE, LYSINE, METHIONINE, ISOLEUCINE, VALINE, HISTIDINE, THREONINE, TRYPTOPHAN, ALANINE, GLYCINE, ARGININE, PROLINE, SERINE, TYROSINE, DEXTROSE SCH MLS/HR: 365; 280; 290; 200; 300; 290; 240; 210; 90; 1035; 515; 575; 340; 250; 20; 15 INJECTION INTRAVENOUS at 01:02

## 2020-06-28 RX ADMIN — ASPIRIN 81 MG CHEWABLE TABLET SCH MG: 81 TABLET CHEWABLE at 09:45

## 2020-06-28 RX ADMIN — ACETAMINOPHEN PRN MG: 325 TABLET, FILM COATED ORAL at 20:29

## 2020-06-28 RX ADMIN — HEPARIN SODIUM SCH UNIT: 5000 INJECTION, SOLUTION INTRAVENOUS; SUBCUTANEOUS at 09:45

## 2020-06-28 RX ADMIN — INSULIN ASPART SCH: 100 INJECTION, SOLUTION INTRAVENOUS; SUBCUTANEOUS at 07:28

## 2020-06-28 RX ADMIN — INSULIN ASPART SCH: 100 INJECTION, SOLUTION INTRAVENOUS; SUBCUTANEOUS at 16:52

## 2020-06-28 RX ADMIN — METRONIDAZOLE SCH MLS/HR: 500 INJECTION, SOLUTION INTRAVENOUS at 23:45

## 2020-06-28 RX ADMIN — LISINOPRIL SCH MG: 5 TABLET ORAL at 09:45

## 2020-06-28 RX ADMIN — NOREPINEPHRINE BITARTRATE SCH: 1 INJECTION, SOLUTION, CONCENTRATE INTRAVENOUS at 20:36

## 2020-06-28 RX ADMIN — HEPARIN SODIUM SCH UNIT: 5000 INJECTION, SOLUTION INTRAVENOUS; SUBCUTANEOUS at 20:30

## 2020-06-28 RX ADMIN — SOYBEAN OIL SCH MLS/HR: 20 INJECTION, SOLUTION INTRAVENOUS at 11:48

## 2020-06-28 RX ADMIN — LEUCINE, PHENYLALANINE, LYSINE, METHIONINE, ISOLEUCINE, VALINE, HISTIDINE, THREONINE, TRYPTOPHAN, ALANINE, GLYCINE, ARGININE, PROLINE, SERINE, TYROSINE, DEXTROSE SCH MLS/HR: 365; 280; 290; 200; 300; 290; 240; 210; 90; 1035; 515; 575; 340; 250; 20; 15 INJECTION INTRAVENOUS at 20:32

## 2020-06-28 RX ADMIN — METRONIDAZOLE SCH MLS/HR: 500 INJECTION, SOLUTION INTRAVENOUS at 01:59

## 2020-06-28 RX ADMIN — SODIUM BICARBONATE SCH: 84 INJECTION, SOLUTION INTRAVENOUS at 16:52

## 2020-06-28 RX ADMIN — SODIUM CHLORIDE SCH MG: 900 INJECTION, SOLUTION INTRAVENOUS at 09:46

## 2020-06-28 RX ADMIN — AMPICILLIN SODIUM AND SULBACTAM SODIUM SCH MLS/HR: 2; 1 INJECTION, POWDER, FOR SOLUTION INTRAMUSCULAR; INTRAVENOUS at 15:32

## 2020-06-28 RX ADMIN — LEVOTHYROXINE SODIUM ANHYDROUS SCH MCG: 100 INJECTION, POWDER, LYOPHILIZED, FOR SOLUTION INTRAVENOUS at 09:49

## 2020-06-28 RX ADMIN — LEUCINE, PHENYLALANINE, LYSINE, METHIONINE, ISOLEUCINE, VALINE, HISTIDINE, THREONINE, TRYPTOPHAN, ALANINE, GLYCINE, ARGININE, PROLINE, SERINE, TYROSINE, DEXTROSE SCH: 365; 280; 290; 200; 300; 290; 240; 210; 90; 1035; 515; 575; 340; 250; 20; 15 INJECTION INTRAVENOUS at 20:33

## 2020-06-28 RX ADMIN — SPIRONOLACTONE SCH MG: 25 TABLET, FILM COATED ORAL at 09:45

## 2020-06-28 RX ADMIN — ANIDULAFUNGIN SCH MLS/HR: 100 INJECTION, POWDER, LYOPHILIZED, FOR SOLUTION INTRAVENOUS at 09:44

## 2020-06-28 RX ADMIN — METRONIDAZOLE SCH MLS/HR: 500 INJECTION, SOLUTION INTRAVENOUS at 06:21

## 2020-06-28 RX ADMIN — INSULIN ASPART SCH: 100 INJECTION, SOLUTION INTRAVENOUS; SUBCUTANEOUS at 12:37

## 2020-06-28 RX ADMIN — AMPICILLIN SODIUM AND SULBACTAM SODIUM SCH MLS/HR: 2; 1 INJECTION, POWDER, FOR SOLUTION INTRAMUSCULAR; INTRAVENOUS at 05:04

## 2020-06-28 RX ADMIN — SODIUM CHLORIDE SCH MG: 900 INJECTION, SOLUTION INTRAVENOUS at 20:30

## 2020-06-28 RX ADMIN — METRONIDAZOLE SCH MLS/HR: 500 INJECTION, SOLUTION INTRAVENOUS at 15:31

## 2020-06-28 RX ADMIN — PANTOPRAZOLE SODIUM SCH MG: 40 INJECTION, POWDER, FOR SOLUTION INTRAVENOUS at 09:45

## 2020-06-28 RX ADMIN — METOPROLOL SUCCINATE SCH MG: 25 TABLET, EXTENDED RELEASE ORAL at 09:45

## 2020-06-28 RX ADMIN — INSULIN ASPART SCH: 100 INJECTION, SOLUTION INTRAVENOUS; SUBCUTANEOUS at 20:36

## 2020-06-28 NOTE — PN
PROGRESS NOTE



DATE OF SERVICE:

06/27/2020



REASON FOR FOLLOWUP:

1. Abdominal abscess.

2. Leukocytosis.



INTERVAL HISTORY:

Patient is currently afebrile.  The patient is breathing comfortably.  Denies having

any chest pain.  No shortness of breath or cough.  Abdominal pain is currently

controlled.  No nausea, vomiting or diarrhea.



PHYSICAL EXAMINATION:

Blood pressure 100/65 with a pulse of 67, temperature 97.6.  She is 100% on room air.

General description is a middle-aged female lying in bed in no distress.

Respiratory system: Unlabored breathing.  Clear to auscultation anteriorly.

Heart S1, S2.  Regular rate and rhythm.

Abdomen soft, no tenderness.



LABS:

White count 11.8. Creatinine is normal.



DIAGNOSTIC IMPRESSION AND PLAN:

Patient with abdominal abscess from a perforated sigmoid diverticulitis status post

_____and diverting colostomy.  Abdominal culture predominant anaerobes.  The patient

covered with Unasyn with worsening white count.  Concern for possible yeast infection

and _____white count shows a downward trend.  Repeat CBC tomorrow.  Continue supportive

care.





MMODL / IJN: [id number] / Job#: [id number]

## 2020-06-28 NOTE — PN
PROGRESS NOTE



Anna is a 59-year-old lady who is admitted to hospital with perforated viscus and

underwent surgery.  Had takotsubo syndrome.  This morning she is feeling well and is

ready to be discharged.  She will probably go to rehab.



EXAM:

Comfortable at rest.  Vital signs are stable. Chest is clear to auscultation.  Heart

exam reveals first and second heart sounds.  No gallop.  Exam of extremities did not

reveal any edema.



Labs show a hemoglobin of 11.1.  Potassium is 5.4, creatinine is 0.35.



The patient is currently on aspirin, lisinopril, Toprol-XL, and spironolactone.



ASSESSMENT:

1. Takotsubo syndrome with cardiomyopathy.

2. Perforated viscus, status post surgery.

3. Hyperkalemia.



PLAN:

I am going to stop the Aldactone given the elevated potassium.  Please recheck the

electrolytes tomorrow.  We will see the patient on an as-needed basis.  Follow up with

Dr. Zavala after discharge.





MMBRANDONL / IJN: [id number] / Job#: [id number]

## 2020-06-28 NOTE — P.PN
Progress Note - Text


Progress Note Date: 06/28/20





The patient's resting comfortably in her bed.  She's had limited oral intake.





On exam vital signs are stable.  Abdomen soft.  There is some stooling in the 

colostomy bag.





Patient encouraged to increase her diet.  We'll continue TPN for now.

## 2020-06-29 VITALS — RESPIRATION RATE: 18 BRPM

## 2020-06-29 LAB
ANION GAP SERPL CALC-SCNC: 7 MMOL/L
BASOPHILS # BLD AUTO: 0 K/UL (ref 0–0.2)
BASOPHILS NFR BLD AUTO: 0 %
BUN SERPL-SCNC: 16 MG/DL (ref 7–17)
CALCIUM SPEC-MCNC: 9.1 MG/DL (ref 8.4–10.2)
CHLORIDE SERPL-SCNC: 106 MMOL/L (ref 98–107)
CO2 SERPL-SCNC: 24 MMOL/L (ref 22–30)
EOSINOPHIL # BLD AUTO: 0.2 K/UL (ref 0–0.7)
EOSINOPHIL NFR BLD AUTO: 2 %
ERYTHROCYTE [DISTWIDTH] IN BLOOD BY AUTOMATED COUNT: 3.61 M/UL (ref 3.8–5.4)
ERYTHROCYTE [DISTWIDTH] IN BLOOD: 17.2 % (ref 11.5–15.5)
GLUCOSE BLD-MCNC: 108 MG/DL (ref 75–99)
GLUCOSE BLD-MCNC: 75 MG/DL (ref 75–99)
GLUCOSE BLD-MCNC: 82 MG/DL (ref 75–99)
GLUCOSE BLD-MCNC: 91 MG/DL (ref 75–99)
GLUCOSE SERPL-MCNC: 97 MG/DL (ref 74–99)
HCT VFR BLD AUTO: 36.1 % (ref 34–46)
HGB BLD-MCNC: 11.5 GM/DL (ref 11.4–16)
LYMPHOCYTES # SPEC AUTO: 1.4 K/UL (ref 1–4.8)
LYMPHOCYTES NFR SPEC AUTO: 14 %
MAGNESIUM SPEC-SCNC: 2.1 MG/DL (ref 1.6–2.3)
MCH RBC QN AUTO: 31.8 PG (ref 25–35)
MCHC RBC AUTO-ENTMCNC: 31.8 G/DL (ref 31–37)
MCV RBC AUTO: 99.9 FL (ref 80–100)
MONOCYTES # BLD AUTO: 0.5 K/UL (ref 0–1)
MONOCYTES NFR BLD AUTO: 5 %
NEUTROPHILS # BLD AUTO: 7.6 K/UL (ref 1.3–7.7)
NEUTROPHILS NFR BLD AUTO: 77 %
PLATELET # BLD AUTO: 452 K/UL (ref 150–450)
POTASSIUM SERPL-SCNC: 4.6 MMOL/L (ref 3.5–5.1)
SODIUM SERPL-SCNC: 137 MMOL/L (ref 137–145)
WBC # BLD AUTO: 9.8 K/UL (ref 3.8–10.6)

## 2020-06-29 RX ADMIN — METOPROLOL SUCCINATE SCH MG: 25 TABLET, EXTENDED RELEASE ORAL at 09:42

## 2020-06-29 RX ADMIN — PANTOPRAZOLE SODIUM SCH MG: 40 INJECTION, POWDER, FOR SOLUTION INTRAVENOUS at 09:45

## 2020-06-29 RX ADMIN — ACETAMINOPHEN PRN MG: 325 TABLET, FILM COATED ORAL at 01:51

## 2020-06-29 RX ADMIN — LEVOTHYROXINE SODIUM ANHYDROUS SCH MCG: 100 INJECTION, POWDER, LYOPHILIZED, FOR SOLUTION INTRAVENOUS at 09:43

## 2020-06-29 RX ADMIN — INSULIN ASPART SCH: 100 INJECTION, SOLUTION INTRAVENOUS; SUBCUTANEOUS at 12:43

## 2020-06-29 RX ADMIN — THERA TABS SCH EACH: TAB at 09:42

## 2020-06-29 RX ADMIN — INSULIN ASPART SCH: 100 INJECTION, SOLUTION INTRAVENOUS; SUBCUTANEOUS at 20:14

## 2020-06-29 RX ADMIN — SODIUM CHLORIDE SCH MG: 900 INJECTION, SOLUTION INTRAVENOUS at 09:44

## 2020-06-29 RX ADMIN — ANIDULAFUNGIN SCH MLS/HR: 100 INJECTION, POWDER, LYOPHILIZED, FOR SOLUTION INTRAVENOUS at 09:44

## 2020-06-29 RX ADMIN — NOREPINEPHRINE BITARTRATE SCH: 1 INJECTION, SOLUTION, CONCENTRATE INTRAVENOUS at 19:18

## 2020-06-29 RX ADMIN — HYDROCODONE BITARTRATE AND ACETAMINOPHEN PRN EACH: 5; 325 TABLET ORAL at 20:00

## 2020-06-29 RX ADMIN — SODIUM FERRIC GLUCONATE COMPLEX SCH MLS/HR: 12.5 INJECTION INTRAVENOUS at 12:38

## 2020-06-29 RX ADMIN — AMPICILLIN SODIUM AND SULBACTAM SODIUM SCH MLS/HR: 2; 1 INJECTION, POWDER, FOR SOLUTION INTRAMUSCULAR; INTRAVENOUS at 18:02

## 2020-06-29 RX ADMIN — AMPICILLIN SODIUM AND SULBACTAM SODIUM SCH MLS/HR: 2; 1 INJECTION, POWDER, FOR SOLUTION INTRAMUSCULAR; INTRAVENOUS at 13:43

## 2020-06-29 RX ADMIN — SOYBEAN OIL SCH: 20 INJECTION, SOLUTION INTRAVENOUS at 12:43

## 2020-06-29 RX ADMIN — ASPIRIN 81 MG CHEWABLE TABLET SCH MG: 81 TABLET CHEWABLE at 09:43

## 2020-06-29 RX ADMIN — LISINOPRIL SCH MG: 5 TABLET ORAL at 09:42

## 2020-06-29 RX ADMIN — HEPARIN SODIUM SCH UNIT: 5000 INJECTION, SOLUTION INTRAVENOUS; SUBCUTANEOUS at 09:45

## 2020-06-29 RX ADMIN — SODIUM CHLORIDE SCH MG: 900 INJECTION, SOLUTION INTRAVENOUS at 20:00

## 2020-06-29 RX ADMIN — SODIUM FERRIC GLUCONATE COMPLEX SCH MLS/HR: 12.5 INJECTION INTRAVENOUS at 11:36

## 2020-06-29 RX ADMIN — NICOTINE SCH PATCH: 14 PATCH, EXTENDED RELEASE TRANSDERMAL at 09:43

## 2020-06-29 RX ADMIN — AMPICILLIN SODIUM AND SULBACTAM SODIUM SCH MLS/HR: 2; 1 INJECTION, POWDER, FOR SOLUTION INTRAMUSCULAR; INTRAVENOUS at 23:45

## 2020-06-29 RX ADMIN — LEUCINE, PHENYLALANINE, LYSINE, METHIONINE, ISOLEUCINE, VALINE, HISTIDINE, THREONINE, TRYPTOPHAN, ALANINE, GLYCINE, ARGININE, PROLINE, SERINE, TYROSINE, DEXTROSE SCH: 365; 280; 290; 200; 300; 290; 240; 210; 90; 1035; 515; 575; 340; 250; 20; 15 INJECTION INTRAVENOUS at 12:44

## 2020-06-29 RX ADMIN — INSULIN ASPART SCH: 100 INJECTION, SOLUTION INTRAVENOUS; SUBCUTANEOUS at 07:29

## 2020-06-29 RX ADMIN — INSULIN ASPART SCH: 100 INJECTION, SOLUTION INTRAVENOUS; SUBCUTANEOUS at 18:13

## 2020-06-29 RX ADMIN — AMPICILLIN SODIUM AND SULBACTAM SODIUM SCH MLS/HR: 2; 1 INJECTION, POWDER, FOR SOLUTION INTRAMUSCULAR; INTRAVENOUS at 06:00

## 2020-06-29 RX ADMIN — SODIUM BICARBONATE SCH: 84 INJECTION, SOLUTION INTRAVENOUS at 17:52

## 2020-06-29 RX ADMIN — HEPARIN SODIUM SCH UNIT: 5000 INJECTION, SOLUTION INTRAVENOUS; SUBCUTANEOUS at 20:00

## 2020-06-29 NOTE — PN
PROGRESS NOTE



DATE OF SERVICE:

06/29/2020



REASON FOR FOLLOWUP:

Abdominal abscess.



INTERVAL HISTORY:

Patient is currently afebrile.  The patient is breathing comfortably.  Denies having

any chest pain.  No shortness of breath or cough.  No nausea, no vomiting.  Abdominal

pain is currently controlled.  No diarrhea.



EXAMINATION:

Blood pressure 107/67with a pulse of 70.  Temperature 97.8.  She is 97% on room air.

General description: The patient is a middle-aged female lying in bed in no distress.

Respiratory system: Unlabored breathing.  Clear to auscultation anteriorly.  Heart S1,

S2.  Regular rate and rhythm.

ABDOMEN:  Soft, no tenderness.



LABS:

White count normal at 9.8, creatinine 0.41.



DIAGNOSTIC IMPRESSION AND PLAN:

Patient with abdominal abscess from perforated diverticulitis in this patient with

overall improvement on Unasyn and Eraxis to continue for about a week to finish a

course of therapy and close outpatient followup.





MMODL / IJN: [id number] / Job#: [id number]

## 2020-06-29 NOTE — P.DS
Providers


Date of admission: 


06/14/20 11:56





Expected date of discharge: 06/29/20


Attending physician: 


Socorro Reed





Consults: 





                                        





06/14/20 23:32


Consult Physician Routine 


   Consulting Provider: Gerri Valenzuela


   Consult Reason/Comments: ICU management


   Do you want consulting provider notified?: Already Contacted





06/15/20 10:03


Consult Physician Routine 


   Consulting Provider: Josie Coleman


   Consult Reason/Comments: Hypotension


   Do you want consulting provider notified?: Yes





06/15/20 10:04


Consult Physician Routine 


   Consulting Provider: Deshaun Harris


   Consult Reason/Comments: Oliguria; Rule out VANNESSA


   Do you want consulting provider notified?: Yes





06/16/20 23:22


Consult Physician Routine 


   Consulting Provider: Antonio Melton


   Consult Reason/Comments: Antibiotic management sepsis


   Do you want consulting provider notified?: Yes, Notify in am





06/17/20 14:02


Consult Physician Routine 


   Consulting Provider: Shukri Mtz


   Consult Reason/Comments: mental status


   Do you want consulting provider notified?: Yes





06/20/20 14:54


Consult Physician Routine 


   Consulting Provider: Chris Zavala


   Consult Reason/Comments: elevated troponin, ekg changes


   Do you want consulting provider notified?: Already Contacted











Primary care physician: 


[facility] Course: 





59-year-old female who presented to the hospital with a chief complaint of 

abdominal pain.  The patient does have a history of gastric bypass surgery 

approximately 15 years ago.  Patient had a CAT scan performed revealing 

intraperitoneal air.  She underwent exploratory laparotomy with sigmoid colectom

y, descending colostomy creation, and appendectomy with Dr. Reed on June 14, 2020. Patient was extubated a couple days after surgery. However, she 

required re-intubation on 6/19/2020. She was again extubated on 6/23/20. Her 

respiratory status has been stable since that time.  Patient was also followed 

by cardiology during hospitalization.  She was diagnosed with Takotsubo syndrome

and cardiomyopathy. She was started on aspirin and metoprolol.  Patient was also

prescribed Aldactone but this was discontinued per cardiology due to 

hyperkalemia.  She was also evaluated by infectious disease during her 

hospitalization. She received a PICC line. She is prescribed Unasyn and Eraxis 

at discharge per infectious disease recommendations. She is tolerating diet. 

Ostomy has been functioning well. She is stable for discharge today to [facility]. 

Please see EMR for further hospital course details.





Discharge Diagnosis


1.  Abdominal pain secondary to ruptured sigmoid colon, fecal peritonitis, 

descending and sigmoid colon impaction, appendicolith with appendicitis


2.  Acute hypoxic respiratory failure requiring intubation


3.  Hypothyroidism


4.  Tobacco abuse


5.  Takotsubo syndrome with cardiomyopathy





Nurse practitioner note has been reviewed by physician. Signing provider agrees 

with the documented findings, assessment, and plan of care. 











Patient Condition at Discharge: Stable





Plan - Discharge Summary


New Discharge Prescriptions: 


New


   Ampicillin-Sulbactam [Unasyn] 3 gm IVPB Q8HR #21 vial


   Non Formulary Drug 100 mg IVPB DAILY #7 vial


   Aspirin 81 mg PO DAILY  chew


   Metoprolol Succinate (ER) [Toprol XL] 25 mg PO DAILY #0 tab.er.24h


   Acetaminophen Tab [Tylenol] 650 mg PO Q4HR PRN  tab


     PRN Reason: Fever And/ Or Pain


   Lisinopril [Zestril] 5 mg PO DAILY  tab





Continue


   Levothyroxine Sodium [Synthroid] 125 mcg PO DAILY


   FLUoxetine HCL [PROzac] 20 mg PO DAILY


   Multivitamins, Thera [Multivitamin (formulary)] 1 tab PO DAILY


   Cholecalciferol [Vitamin D3 (25 Mcg = 1000 Iu)] 1,000 unit PO DAILY





Discontinued


   Propranolol HCl [Inderal LA] 80 mg PO HS


   Baclofen [Lioresal] 20 mg PO HS


   Rizatriptan Benzoate [Maxalt MLT] 10 mg PO DAILY PRN


     PRN Reason: Migraine Headache


   Butalb/APAP/Caff -40Mg [Fioricet -40] 1 tab PO Q4H PRN


     PRN Reason: Migraine Headache


   Chlorthalidone 50 mg PO DAILY


Discharge Medication List





FLUoxetine HCL [PROzac] 20 mg PO DAILY 05/31/18 [History]


Levothyroxine Sodium [Synthroid] 125 mcg PO DAILY 05/31/18 [History]


Cholecalciferol [Vitamin D3 (25 Mcg = 1000 Iu)] 1,000 unit PO DAILY 06/14/20 

[History]


Multivitamins, Thera [Multivitamin (formulary)] 1 tab PO DAILY 06/14/20 

[History]


Acetaminophen Tab [Tylenol] 650 mg PO Q4HR PRN  tab 06/29/20 [Rx]


Ampicillin-Sulbactam [Unasyn] 3 gm IVPB Q8HR #21 vial 06/29/20 [Rx]


Aspirin 81 mg PO DAILY  chew 06/29/20 [Rx]


Lisinopril [Zestril] 5 mg PO DAILY  tab 06/29/20 [Rx]


Metoprolol Succinate (ER) [Toprol XL] 25 mg PO DAILY #0 tab.er.24h 06/29/20 [Rx]


Non Formulary Drug 100 mg IVPB DAILY #7 vial 06/29/20 [Rx]








Follow up Appointment(s)/Referral(s): 


Socorro Reed MD [STAFF PHYSICIAN] - 07/14/20


Galdino Shaw DO [Primary Care Provider] - 1-2 days


Chris Zavala MD [STAFF PHYSICIAN] - 1 Week


Patient Instructions/Handouts:  Colostomy Care (GEN)


Activity/Diet/Wound Care/Special Instructions: 


Leave abdominal dressings in place until patient is seen at her follow up 

appointment with Dr. Reed





No lifting over 10 pounds


You may shower. No soaking or tub baths


Very light activity until you are reevaluated at your follow up appointment with

your surgeon


Care Plan Goals (MU): 


Colostomy Care Recommendations fro Hospital to Rehab facility:


LAst date of pouching system change:  6.28.2020


Mrs Carter will have the following osotmy care supplies sent with her to Rehab:


[facility] pouching system #[id number] one piece cut to fit with filter (3)


Ostomy powder (1)


No sting prep pads (12)





Rehab facility please set Mrs Fu up for disposable pouching system in 2 weeks 

and preferably a precut appliance system 


Mrs Fu is to empty the pouching system when it is 1/2 to 1/3 full while 

sittign on the toilet or facing the toilet.

## 2020-06-29 NOTE — PN
PROGRESS NOTE



DATE OF SERVICE:

06/28/2020



REASON FOR FOLLOWUP:

Abdominal abscess.



INTERVAL HISTORY:

The patient is currently afebrile.  The patient mentioning she may have ate more this

evening and is feeling full, but denies any nausea, vomiting.  No chest pain, shortness

of breath or cough.



PHYSICAL EXAMINATION:

Blood pressure is 95/60 with a pulse of 78, temperature 98.4.  She is 96% on room air.

General description is a middle-aged female lying in bed in no distress.

RESPIRATORY SYSTEM: Unlabored breathing, clear to auscultation anteriorly.  HEART: S1,

S2.  Regular rate and rhythm.

ABDOMEN:  Soft, _____ No guarding or rigidity.



LABS:

Hemoglobin is 11.1, white count 12.2 with a BUN of 14 and creatinine 0.35.



DIAGNOSTIC IMPRESSION AND PLAN:

Patient with abdominal abscess in this patient who did have a perforated sigmoid

diverticulitis status post laparotomy and diverting colostomy.  Abdominal culture

predominantly with anaerobes.  The patient on Unasyn with worsening of the white count

responded to the Eraxis.  May continue with Eraxis in the outpatient setting and we are

not able to use Diflucan along with Unasyn for about a week and close outpatient

followup.





MMODL / IJN: [id number] / Job#: [id number]

## 2020-06-30 VITALS — DIASTOLIC BLOOD PRESSURE: 60 MMHG | TEMPERATURE: 98.3 F | SYSTOLIC BLOOD PRESSURE: 90 MMHG | HEART RATE: 76 BPM

## 2020-06-30 LAB
ANION GAP SERPL CALC-SCNC: 5 MMOL/L
BUN SERPL-SCNC: 13 MG/DL (ref 7–17)
CALCIUM SPEC-MCNC: 9.2 MG/DL (ref 8.4–10.2)
CHLORIDE SERPL-SCNC: 106 MMOL/L (ref 98–107)
CO2 SERPL-SCNC: 25 MMOL/L (ref 22–30)
GLUCOSE BLD-MCNC: 91 MG/DL (ref 75–99)
GLUCOSE BLD-MCNC: 92 MG/DL (ref 75–99)
GLUCOSE SERPL-MCNC: 84 MG/DL (ref 74–99)
MAGNESIUM SPEC-SCNC: 1.9 MG/DL (ref 1.6–2.3)
POTASSIUM SERPL-SCNC: 4.9 MMOL/L (ref 3.5–5.1)
SODIUM SERPL-SCNC: 136 MMOL/L (ref 137–145)

## 2020-06-30 RX ADMIN — METOPROLOL SUCCINATE SCH MG: 25 TABLET, EXTENDED RELEASE ORAL at 09:00

## 2020-06-30 RX ADMIN — ASPIRIN 81 MG CHEWABLE TABLET SCH MG: 81 TABLET CHEWABLE at 08:54

## 2020-06-30 RX ADMIN — LISINOPRIL SCH: 5 TABLET ORAL at 09:00

## 2020-06-30 RX ADMIN — INSULIN ASPART SCH: 100 INJECTION, SOLUTION INTRAVENOUS; SUBCUTANEOUS at 11:39

## 2020-06-30 RX ADMIN — METOPROLOL SUCCINATE SCH MG: 25 TABLET, EXTENDED RELEASE ORAL at 08:54

## 2020-06-30 RX ADMIN — LEUCINE, PHENYLALANINE, LYSINE, METHIONINE, ISOLEUCINE, VALINE, HISTIDINE, THREONINE, TRYPTOPHAN, ALANINE, GLYCINE, ARGININE, PROLINE, SERINE, TYROSINE, DEXTROSE SCH: 365; 280; 290; 200; 300; 290; 240; 210; 90; 1035; 515; 575; 340; 250; 20; 15 INJECTION INTRAVENOUS at 08:48

## 2020-06-30 RX ADMIN — AMPICILLIN SODIUM AND SULBACTAM SODIUM SCH MLS/HR: 2; 1 INJECTION, POWDER, FOR SOLUTION INTRAMUSCULAR; INTRAVENOUS at 12:49

## 2020-06-30 RX ADMIN — LISINOPRIL SCH MG: 5 TABLET ORAL at 08:54

## 2020-06-30 RX ADMIN — HYDROCODONE BITARTRATE AND ACETAMINOPHEN PRN EACH: 5; 325 TABLET ORAL at 03:02

## 2020-06-30 RX ADMIN — ANIDULAFUNGIN SCH MLS/HR: 100 INJECTION, POWDER, LYOPHILIZED, FOR SOLUTION INTRAVENOUS at 08:53

## 2020-06-30 RX ADMIN — INSULIN ASPART SCH: 100 INJECTION, SOLUTION INTRAVENOUS; SUBCUTANEOUS at 08:16

## 2020-06-30 RX ADMIN — THERA TABS SCH EACH: TAB at 08:54

## 2020-06-30 RX ADMIN — AMPICILLIN SODIUM AND SULBACTAM SODIUM SCH MLS/HR: 2; 1 INJECTION, POWDER, FOR SOLUTION INTRAMUSCULAR; INTRAVENOUS at 05:37

## 2020-06-30 RX ADMIN — PANTOPRAZOLE SODIUM SCH MG: 40 INJECTION, POWDER, FOR SOLUTION INTRAVENOUS at 08:54

## 2020-06-30 RX ADMIN — LEVOTHYROXINE SODIUM ANHYDROUS SCH MCG: 100 INJECTION, POWDER, LYOPHILIZED, FOR SOLUTION INTRAVENOUS at 08:56

## 2020-06-30 RX ADMIN — SODIUM CHLORIDE SCH MG: 900 INJECTION, SOLUTION INTRAVENOUS at 08:56

## 2020-06-30 RX ADMIN — HEPARIN SODIUM SCH UNIT: 5000 INJECTION, SOLUTION INTRAVENOUS; SUBCUTANEOUS at 08:54

## 2020-06-30 RX ADMIN — NICOTINE SCH PATCH: 14 PATCH, EXTENDED RELEASE TRANSDERMAL at 08:53

## 2020-06-30 RX ADMIN — SODIUM BICARBONATE SCH MLS/HR: 84 INJECTION, SOLUTION INTRAVENOUS at 03:02

## 2020-06-30 NOTE — PN
PROGRESS NOTE



DATE OF SERVICE:

06/30/2020



REASON FOR FOLLOWUP:

Abdominal abscess and leukocytosis.



INTERVAL HISTORY:

The patient is currently afebrile.  The patient is breathing comfortably.  Denies

having any chest pain or shortness of breath or cough.  No nausea, vomiting or

diarrhea.  Currently waiting for _____.



On examination, blood pressure 90/60 with a pulse of 73, temperature 98.3.  She is 96%

on room air. General description:  The patient is a middle-aged female lying in bed in

no distress.  Respiratory system: Unlabored breathing.  Clear to auscultation

anteriorly.  Heart S1, S2.  Regular rate and rhythm. Abdomen soft, no tenderness.



LABS:

Creatinine normal. White count normal as of yesterday.



DIAGNOSTIC IMPRESSION AND PLAN:

Patient with an abdominal abscess from a perforated diverticulitis status post

laparotomy and diverting colostomy and culture most anaerobes, possibly elevated white

count.  The patient is currently on Unasyn and Eraxis to continue 1 week to finish

course of therapy and close outpatient followup.





MMODL / IJN: [id number] / Job#: [id number]

## 2020-06-30 NOTE — P.PN
Subjective


Progress Note Date: 06/30/20





CHIEF COMPLAINT: Abdominal pain





HISTORY OF PRESENT ILLNESS: 59-year-old female who underwent exploratory 

laparotomy with sigmoid colectomy, descending colostomy creation, and 

appendectomy with Dr. Reed on June 14, 2020. Patient was cleared for 

discharge yesterday to [facility]. Discharge delayed due to insurance authorization. 

Patient states she was able to speak with the bariatric dietitian yesterday. She

remains stable for discharge today.





PHYSICAL EXAM: 


VITAL SIGNS: Reviewed


GENERAL: Well-developed in no acute distress


HEENT:  No sclera icterus. Extraocular movements grossly intact.  Moist buccal 

mucosa. 


Head is atraumatic, normocephalic. No nasal drainage.


NECK:  Supple without lymphadenopathy.


CHEST:  Non-labored respirations and equal bilateral excursions


CARDIOVASCULAR:  Regular rate with regular rhythm.  Palpable 2+ radial pulses.


ABDOMEN:  Soft.  Nondistended. Dressing clean dry intact. Ostomy to left side of

abdomen. Stoma pink. 


MUSCULOSKELETAL:  No clubbing or cyanosis.


NEUROLOGIC:  No focal or lateralizing signs.  Cranial nerves II through XII 

grossly intact.


PSYCH:  Appropriate affect.  Alert and oriented to person, place and time.


SKIN: Well perfused.  Good skin turgor. 





ASSESSMENT: 


1.  Abdominal pain secondary to ruptured sigmoid colon, fecal peritonitis, 

descending and sigmoid colon impaction, appendicolith with appendicitis





PLAN: 


Patient requesting to speak with ostomy nurse again before discharge. Notified 

Tamiko White


Patient remains stable for discharge to [facility] today pending insurance 

authorization. Please refer to discharge summary dictated 6/29/2020





Nurse practitioner note has been reviewed by physician. Signing provider agrees 

with the documented findings, assessment, and plan of care. 








Objective





- Vital Signs


Vital signs: 


                                   Vital Signs











Temp  98.3 F   06/30/20 05:05


 


Pulse  76   06/30/20 05:05


 


Resp  18   06/30/20 05:05


 


BP  90/60   06/30/20 05:05


 


Pulse Ox  96   06/30/20 05:05








                                 Intake & Output











 06/29/20 06/30/20 06/30/20





 18:59 06:59 18:59


 


Intake Total 140 440 


 


Output Total 1105 220 


 


Balance -965 220 


 


Weight 79.8 kg  


 


Intake:   


 


   200 


 


    Ampicillin-Sulbactam 3 gm  100 





    In Sodium Chloride 0.9%   





    100 ml @ 200 mls/hr IVPB   





    Q6HR ABIGAIL Rx#:[id number]   


 


    Sodium Chloride 0.45% 1, 140 100 





    000 ml @ 20 mls/hr IV .   





    Q24H [facility] Rx#:[id number]   


 


  Oral  240 


 


Output:   


 


  Drainage 5  


 


    Right Lower Abdomen 5  


 


  Urine 1100 200 


 


  Stool  20 


 


Other:   


 


  Voiding Method Bedpan Bedpan 


 


  # Voids  200 








                       ABP, PAP, CO, CI - Last Documented











Arterial Blood Pressure        112/50

















- Labs


CBC & Chem 7: 


                                 06/29/20 05:58





                                 06/30/20 07:51


Labs: 


                  Abnormal Lab Results - Last 24 Hours (Table)











  06/30/20 Range/Units





  07:51 


 


Sodium  136 L  (137-145)  mmol/L


 


Creatinine  0.41 L  (0.52-1.04)  mg/dL








                      Microbiology - Last 24 Hours (Table)











 06/24/20 13:50 Blood Culture - Preliminary





 Blood    No Growth after 120 hours

## 2020-06-30 NOTE — P.PN
Subjective


Progress Note Date: 06/30/20 06/30/2020: Patient now on a regular medical floor.  Patient is fully alert 

awake oriented.  Wants to know when she can be transferred to rehab facility.  

Generalized weakness is gradually improving.  Mentation is clear.  Offers no 

complaints.





06/19/2020: Patient continues to be encephalopathic, moaning, groaning.  However

she is following commands as per examination.  Patient has received 4 mg of Hald

ol last night, as she became agitated, was pulling of lines, restless 

tachypneic.  She has received 0.5 mg of Dilaudid at 8:30 AM today.  Patient's 

oxygen saturation has been dropping, and on ABG, her saturation was 52%, pH 7.51

and pCO2 32.  Patient also started on BiPAP.





06/18/2020: Patient was seen for a follow-up.  Patient apparently was showing 

clinical improvement this morning, with some response in talking few words.  

Patient however was in pain.  She was given 0.5 mg Dilaudid at 8:30 PM last 

night, then there are 0.5 mg Dilaudid at 3:30 AM and then 11 AM this morning 

also.  She also received 4 mg of Haldol for restlessness and/any agitation at 

9:45 AM.  Patient subsequently became more groggy, less responsive.  At present 

patient is just moaning, speaks "OK", or "all right".  Patient continues to be 

encephalopathic.  





Objective





- Vital Signs


Vital signs: 


                                   Vital Signs











Temp  98.3 F   06/30/20 05:05


 


Pulse  76   06/30/20 05:05


 


Resp  18   06/30/20 05:05


 


BP  90/60   06/30/20 05:05


 


Pulse Ox  96   06/30/20 05:05








                                 Intake & Output











 06/29/20 06/30/20 06/30/20





 18:59 06:59 18:59


 


Intake Total 140 440 


 


Output Total 1105 220 


 


Balance -965 220 


 


Weight 79.8 kg  


 


Intake:   


 


   200 


 


    Ampicillin-Sulbactam 3 gm  100 





    In Sodium Chloride 0.9%   





    100 ml @ 200 mls/hr IVPB   





    Q6HR [facility]IGAIL Rx#:[id number]   


 


    Sodium Chloride 0.45% 1, 140 100 





    000 ml @ 20 mls/hr IV .   





    Q24H [facility]IGAIL Rx#:[id number]   


 


  Oral  240 


 


Output:   


 


  Drainage 5  


 


    Right Lower Abdomen 5  


 


  Urine 1100 200 


 


  Stool  20 


 


Other:   


 


  Voiding Method Bedpan Bedpan Bedpan


 


  # Voids  200 








                       ABP, PAP, CO, CI - Last Documented











Arterial Blood Pressure        112/50

















- Exam





Patient's mental status is completely normal.  Patient is fully oriented 4.  

Speech and language functions, cranial nerves are normal.  Muscle strength is 

normal in the biceps, deltoid is 5-, triceps 5-,  is normal.  Ankles and 

toes are normal.  Hip flexion is weak about 3+ to 4-bilaterally.  Patient has 1+

reflex at the ankle. 





- Labs


CBC & Chem 7: 


                                 06/29/20 05:58





                                 06/30/20 07:51


Labs: 


                  Abnormal Lab Results - Last 24 Hours (Table)











  06/30/20 Range/Units





  07:51 


 


Sodium  136 L  (137-145)  mmol/L


 


Creatinine  0.41 L  (0.52-1.04)  mg/dL








                      Microbiology - Last 24 Hours (Table)











 06/24/20 13:50 Blood Culture - Preliminary





 Blood    No Growth after 120 hours














Assessment and Plan


Assessment: 





* Altered mental status, likely related to toxic metabolic encephalopathy, now 

  resolved.    


* Status post acute kidney injury, secondary to hypotension, improving.


* Acute abdomen with perforated bowel, status post exploratory laparotomy, 

  sigmoid colectomy and colostomy.


* Status post hypoxic acute respiratory failure, status post extubation.  

  Patient still hypoxic at times, with O2 saturation 52% on ABG.


* Sepsis related to intra-abdominal source and bowel perforation with fecal 

  peritonitis.


Plan: 





* Patient's mentation is completely normal.  


* Muscle strength also much improved.  Appears generalized weak, which hopefully

  will get better with time and with rehabilitation.  


* Neurologically clear for transfer to rehab facility.  


* Neurology will sign off.  


* B12 is normal 1457, folate 9.0, TSH is elevated 6.86, with low free T4 0.57.  

  Patient started on Synthroid.

## 2023-10-17 ENCOUNTER — HOSPITAL ENCOUNTER (OUTPATIENT)
Dept: HOSPITAL 47 - RADMAMWWP | Age: 63
Discharge: HOME | End: 2023-10-17
Attending: FAMILY MEDICINE
Payer: COMMERCIAL

## 2023-10-17 DIAGNOSIS — N95.1: ICD-10-CM

## 2023-10-17 DIAGNOSIS — Z12.31: Primary | ICD-10-CM

## 2023-10-17 DIAGNOSIS — M85.89: ICD-10-CM

## 2023-10-17 DIAGNOSIS — M81.0: ICD-10-CM

## 2023-10-17 PROCEDURE — 77080 DXA BONE DENSITY AXIAL: CPT

## 2023-10-17 PROCEDURE — 77067 SCR MAMMO BI INCL CAD: CPT

## 2023-10-17 NOTE — BD
EXAMINATION TYPE: Axial Bone Density

 

DATE OF EXAM: 10/17/2023

 

CLINICAL HISTORY: 62 years old Female.  ICD-10 CODE: N95.1 MENOPAUSAL

 

Height:  66"

Weight:  118.4lbs

 

FRAX RISK QUESTIONS:

Alcohol (3 or more units per day):  No

Family History (Parent hip fracture):  No

Glucocorticoids (More than 3mos):  No

           (Ex: prednisone, prednisolone, methylprednisolone, dexamethasone, and hydrocortisone).    
     

History of Fracture in Adulthood: No

Secondary Osteoporosis:

  1.  Type 1 Diabetes: No

  2.  Hyperthyroidism: No

  3.  Menopause before 45: No

  4.  Malnutrition: No

  5.  Chronic liver disease: No

Rheumatoid Arthritis: No

Current Tobacco Use: Yes

 

RISK FACTORS 

HISTORY OF: 

Hip Fracture (Right/Left): No

Spine Fracture: No

History of Wrist Fracture: No

Surgery to Spine/Hip(right/left)/Wrist (right/left): No

Family History of Osteoporosis: No

Active: Yes

Diet low in dairy products/other sources of calcium:  No

Postmenopausal woman: Yes

Lost more than 2 inches in height since high school: No

Frequent falls: No

Poor Health: No

Hyperparathyroidism: No

Adrenal Insufficiency: No

 

MEDICATIONS: 

Prednisone or other steroids: No

Thyroid Medications: Yes 

Which medication: Levothyroxine

How Long: 10+ years

Osteoporosis Medications: No

Additional Medications: Levothyroxine, heart medication, anxiety meds, Vitamin D, multivitamin, migra
ine meds  

 

 

Additional History: None

 

 

EXAM MEASUREMENTS: 

Bone mineral densitometry was performed using the [employer] System.

Bone mineral density as measured about the Lumbar spine is:  

----- L1-L4(G/cm2): 0.949

T Score Values are as follows:

----- L1: -4.4

----- L2: -3.8

----- L3: -2.2

----- L4: 1.5

----- L1-L4: -1.9

 

Z Score Values are as follows:

----- L1: -2.4

----- L2: -1.9

----- L3: -0.2

----- L4: 3.4

----- L1-L4: 0.0

 

Baseline @MPH

 

Bone mineral density about the R hip (g/cm2): 0.638

Bone mineral density about the L hip (g/cm2): 0.679

T Score values are as follows:

-----R Neck: -2.5

-----L Neck: -2.1

-----R Total: -2.9

-----L Total: -2.6

 

Z Score values are as follows:

-----R Neck: -0.8

-----L Neck: -0.4

-----R Total: -1.5

-----L Total: -1.2

 

Baseline @MPH

 

 

FRAX%s: The graph provided illustrates a 12.2% chance for a major osteoporotic fx and a 4.3% chance f
or the hips probability for fx in 10 years time.

 

 

 

 

IMPRESSION:

Osteopenia (T Score between -2.5 and -1).

 

There is slightly increased risk of fracture and the patient may be considered 

for treatment. 

 

Re-Screen 2-5 years.

 

NOTE:  T-SCORE=SD OF THE YOUNG ADULT MEAN.

## 2023-10-18 NOTE — MM
Reason for Exam: Screening  (asymptomatic). 

Baseline mammogram.





Patient History: 

Menarche at age 11. First Full-Term Pregnancy at age 37. Late child-bearing (after 28). Left ovary

removed at age 32. 





Risk Values: 

Galina 5 year model risk: 2.2%.

NCI Lifetime model risk: 8.9%.





Prior Study Comparison: 

Patient's first Mammogram. 





Tissue Density: 

The breast tissue is heterogeneously dense. This may lower the sensitivity of mammography.





Findings: 

Analyzed By CAD. 

No significant mass, suspicious microcalcification, or other discrete abnormality is seen. 





Overall Assessment: Negative, BI-RAD 1





Management: 

Screening Mammogram of both breasts in 1 year.

.



Patient should continue monthly self-breast exams.  A clinical breast exam by your physician is

recommended on an annual basis.

This exam should not preclude additional follow-up of suspicious palpable abnormalities.



Note on Galina scores and lifetime risk:

1. A Galina score greater than 3% is considered moderate risk. If this is the case, consider

specialist referral to assess eligibility for a risk reducing agent.

2. If overall lifetime risk for the development of breast cancer is 20% or higher, the patient may

qualify for future screening with alternating mammogram and breast MRI.



Electronically signed and approved by: Belen Orantes M.D. Radiologist

## 2024-05-30 NOTE — CONS
CONSULTATION



Mrs. Carter is a 59-year-old female with no prior documented history of coronary artery

disease according to the records who presented yesterday to the emergency room with

severe abdominal pain, was found to have free air and underwent surgery by Dr. Reed and was found to have a ruptured sigmoid colon and had a descending colostomy

with creation of Roselia procedure and disimpaction of the descending colon.

Cardiology consultation was requested for hypertension.  No history could be obtained

except the records.  The patient had no recent admission, there is no documented

history of coronary artery disease.



Looking at her medication as an outpatient.  She was on propranolol, chlorthalidone,

which is suggestive that she has a history of hypertension.  There is no indication

that she is diabetic.



No review of systems could be obtained.



She has been maintained on norepinephrine, although the dose is down.  She is in sinus

tachycardia but no evidence of atrial fibrillation or ventricular tachycardia.



PHYSICAL EXAMINATION:

59-year-old female, intubated, sedated.  Blood pressure 105/80 with a heart rate in the

1 teens.  T-max of a 100.1.

HEAD:  Normocephalic.

EYES sclerae anicteric.

NECK no bruit.

LUNGS:  Clear to auscultation anteriorly.

HEART tachycardic, S1, S2.  No S3.  No rub or gallop appreciated.

ABDOMEN dressing in place.  Colostomy noted.

EXTREMITIES:  No edema.



LAB DATA:

Revealed hemoglobin of 17.6, white blood cells 6.8.  Her BUN and creatinine 21 and

0.85.  Potassium 4.0.  Her pH 7.27 with pCO2 39, PO2 103.  Her chest x-ray shows a

questionable infiltrate on the left base versus small pleural effusion.  Her EKG on

presentation revealed a sinus mechanism, rate of 92, normal axis with evidence

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

extension.



IMPRESSION:

1. Ruptured sigmoid. Status post surgery.

2. Postoperative hypotension.

3. Questionable prior myocardial infarction per EKG.

4. History of hypertension for medication.



RECOMMENDATION:

From the cardiac standpoint, the patient will continue on the present regimen with

fluid resuscitation and the norepinephrine.  I will obtain echocardiogram with Doppler

and depending on those testing, further recommendations will be made.  Once the blood

pressure stabilizes, then I would recommend to re-initiate the beta blocker because of

the fact that she was on a beta blocker in the past.



Thank you for this consult.  We will follow with you.





SHAYYODL / IJN: [id number] / Job#: [id number] Indication: Provided medical care services as part of ongoing care related to the patient's single, serious or complex chronic condition. Detail Level: Simple Do Not Use If Visit Has Modifier 25 And Other Service Is Not A Preventive Service, Immunization, Or Annual Wellness Visit: : Visit complexity inherent to evaluation and management associated with medical care services that serve as the continuing focal point for all needed health care services and/or with medical care services that are part of ongoing care related to a patient’s single, serious, or complex chronic condition